# Patient Record
Sex: MALE | Race: WHITE | NOT HISPANIC OR LATINO | Employment: OTHER | URBAN - METROPOLITAN AREA
[De-identification: names, ages, dates, MRNs, and addresses within clinical notes are randomized per-mention and may not be internally consistent; named-entity substitution may affect disease eponyms.]

---

## 2021-03-04 PROBLEM — N40.1 BENIGN PROSTATIC HYPERPLASIA WITH URINARY FREQUENCY: Status: ACTIVE | Noted: 2021-03-04

## 2021-03-04 PROBLEM — R35.0 BENIGN PROSTATIC HYPERPLASIA WITH URINARY FREQUENCY: Status: ACTIVE | Noted: 2021-03-04

## 2021-03-04 PROBLEM — I48.19 PERSISTENT ATRIAL FIBRILLATION (HCC): Status: ACTIVE | Noted: 2021-03-04

## 2021-03-04 PROBLEM — I48.91 ATRIAL FIBRILLATION WITH RVR (HCC): Status: ACTIVE | Noted: 2021-03-04

## 2021-03-04 PROBLEM — I50.9 ACUTE DECOMPENSATED HEART FAILURE (HCC): Status: ACTIVE | Noted: 2021-03-04

## 2021-03-04 PROBLEM — E66.9 OBESITY (BMI 30-39.9): Status: ACTIVE | Noted: 2021-03-04

## 2021-03-04 PROBLEM — I10 ESSENTIAL HYPERTENSION: Status: ACTIVE | Noted: 2021-03-04

## 2021-03-04 PROBLEM — D64.9 NORMOCYTIC ANEMIA: Status: ACTIVE | Noted: 2021-03-04

## 2021-03-18 PROBLEM — I50.21 ACUTE SYSTOLIC (CONGESTIVE) HEART FAILURE (HCC): Status: ACTIVE | Noted: 2021-03-04

## 2021-03-31 PROBLEM — M19.90 ARTHRITIS: Status: ACTIVE | Noted: 2021-03-31

## 2022-01-04 PROBLEM — R20.0 BILATERAL HAND NUMBNESS: Status: ACTIVE | Noted: 2022-01-04

## 2022-01-04 PROBLEM — M54.12 CERVICAL RADICULOPATHY: Status: ACTIVE | Noted: 2022-01-04

## 2022-01-04 PROBLEM — E11.42 DIABETIC POLYNEUROPATHY ASSOCIATED WITH TYPE 2 DIABETES MELLITUS (HCC): Status: ACTIVE | Noted: 2022-01-04

## 2022-04-05 PROBLEM — Z96.652 S/P TOTAL KNEE ARTHROPLASTY, LEFT: Status: ACTIVE | Noted: 2022-04-05

## 2022-08-01 ENCOUNTER — OFFICE VISIT (OUTPATIENT)
Dept: CARDIOLOGY | Facility: MEDICAL CENTER | Age: 72
End: 2022-08-01
Payer: COMMERCIAL

## 2022-08-01 ENCOUNTER — TELEPHONE (OUTPATIENT)
Dept: CARDIOLOGY | Facility: MEDICAL CENTER | Age: 72
End: 2022-08-01

## 2022-08-01 VITALS
HEIGHT: 76 IN | BODY MASS INDEX: 28.49 KG/M2 | OXYGEN SATURATION: 99 % | DIASTOLIC BLOOD PRESSURE: 60 MMHG | HEART RATE: 53 BPM | SYSTOLIC BLOOD PRESSURE: 132 MMHG | WEIGHT: 234 LBS | RESPIRATION RATE: 16 BRPM

## 2022-08-01 DIAGNOSIS — I34.0 NONRHEUMATIC MITRAL VALVE REGURGITATION: ICD-10-CM

## 2022-08-01 DIAGNOSIS — I25.10 CORONARY ARTERY DISEASE INVOLVING NATIVE CORONARY ARTERY OF NATIVE HEART WITHOUT ANGINA PECTORIS: ICD-10-CM

## 2022-08-01 DIAGNOSIS — I10 ESSENTIAL HYPERTENSION: ICD-10-CM

## 2022-08-01 DIAGNOSIS — Z79.01 CHRONIC ANTICOAGULATION: ICD-10-CM

## 2022-08-01 DIAGNOSIS — I42.0 DILATED CARDIOMYOPATHY (HCC): ICD-10-CM

## 2022-08-01 DIAGNOSIS — I48.19 PERSISTENT ATRIAL FIBRILLATION (HCC): ICD-10-CM

## 2022-08-01 DIAGNOSIS — I36.1 NONRHEUMATIC TRICUSPID VALVE REGURGITATION: ICD-10-CM

## 2022-08-01 DIAGNOSIS — I50.22 CHRONIC SYSTOLIC CONGESTIVE HEART FAILURE (HCC): ICD-10-CM

## 2022-08-01 DIAGNOSIS — Z95.5 STENTED CORONARY ARTERY: ICD-10-CM

## 2022-08-01 LAB — EKG IMPRESSION: NORMAL

## 2022-08-01 PROCEDURE — 90050 PR POSTPARTUM VISIT: CPT | Performed by: INTERNAL MEDICINE

## 2022-08-01 PROCEDURE — 93000 ELECTROCARDIOGRAM COMPLETE: CPT | Performed by: INTERNAL MEDICINE

## 2022-08-01 ASSESSMENT — ENCOUNTER SYMPTOMS
WEAKNESS: 0
NEUROLOGICAL NEGATIVE: 1
EYES NEGATIVE: 1
NAUSEA: 0
PALPITATIONS: 0
DEPRESSION: 0
BLURRED VISION: 0
SHORTNESS OF BREATH: 1
CONSTITUTIONAL NEGATIVE: 1
BRUISES/BLEEDS EASILY: 0
CHILLS: 0
PSYCHIATRIC NEGATIVE: 1
DIZZINESS: 0
ABDOMINAL PAIN: 0
CLAUDICATION: 0
HEARTBURN: 1
MYALGIAS: 0
FEVER: 0
WEIGHT LOSS: 0
CARDIOVASCULAR NEGATIVE: 1
FALLS: 1
COUGH: 0
DOUBLE VISION: 0
NERVOUS/ANXIOUS: 0
HEADACHES: 0
FOCAL WEAKNESS: 0
CONSTIPATION: 1
VOMITING: 0

## 2022-08-01 ASSESSMENT — FIBROSIS 4 INDEX: FIB4 SCORE: 3.12

## 2022-08-01 NOTE — TELEPHONE ENCOUNTER
Records request per JI    Sent STAT records request [facility].    Confirmation fax received and sent to scan.

## 2022-08-01 NOTE — PROGRESS NOTES
Chief Complaint   Patient presents with    Atrial Fibrillation     Dx: PAF        Subjective     Shaheen Peguero is a 72 y.o. male who presents today as a consult from Daysi Villalba from [facility] for atrial fibrillation.    Thank you for allowing me to evaluate Mr. Peguero, who as you know is a 72 year old  with atrial fibrillation, coronary artery disease with prior stent placement, dilated cardiomyopathy, valvular heart disease, hypertension, dyslipidemia, previous tobacco abuse, family history of coronary artery disease. He is clinically doing well. He admits to mild shortness of breath. He denies chest pain, palpitations, nausea/vomiting or diaphoresis.He has had majority of his care at [facility].     Past Medical History:   Diagnosis Date    A-fib (HCC)     Acute nasopharyngitis     COVID Jan 1 2022    Allergy     Anemia     Arthritis     At risk for sleep apnea     Atrial fibrillation (HCC)     Blood clotting disorder (HCC)     DVT after R TKA 2012    Cataract     2014    Cervical radiculopathy 01/04/2022    Congestive heart failure (HCC)     2020 SOB then Dx CHF 2021 Becerra for 3 days       Diabetes (HCC)     1996    Diabetic polyneuropathy associated with type 2 diabetes mellitus (HCC) 01/04/2022    Dilated cardiomyopathy (HCC)     Heart failure, congestive, etiology unknown (HCC)     Afib with Cardio version April 2021    High cholesterol     Hypertension     Indigestion     Nonrheumatic mitral valve regurgitation 08/01/2022    Pneumonia     2015 double PNX 2 days in ICU    Pulmonary hypertension (HCC)     Urinary incontinence      Past Surgical History:   Procedure Laterality Date    PB TOTAL KNEE ARTHROPLASTY Left 4/5/2022    Procedure: LEFT TOTAL KNEE ARTHROPLASTY WITH KASSI;  Surgeon: Marco Patel M.D.;  Location: SURGERY [place];  Service: Orthopedics    [place] COLONOSCOPY,DIAGNOSTIC  3/14/2022    Procedure: COLONOSCOPY;  Surgeon: Ronal Lockett D.O.;  Location: SURGERY [place] [facility];  Service:  General    CARDIOVASCULAR  2021    cardio version     ANGIOGRAM  2021    EYE SURGERY Left 2015    Cataracts Removal    ARTHROPLASTY Right 2012    Knee Replacement    KNEE ARTHROPLASTY TOTAL Right 2012    DVT behind knee after TKA    ORIF, FRACTURE, HUMERUS Right 2010     Family History   Problem Relation Age of Onset    Diabetes Father     Arthritis Father     Lung Disease Brother     Alcohol abuse Brother     Heart Disease Mother     Hypertension Brother     Alcohol abuse Brother      Social History     Socioeconomic History    Marital status:      Spouse name: Cici    Number of children: 0    Years of education: 12    Highest education level: Not on file   Occupational History    Not on file   Tobacco Use    Smoking status: Former Smoker     Packs/day: 1.00     Years: 20.00     Pack years: 20.00     Types: Cigarettes    Smokeless tobacco: Former User     Quit date: 1991   Vaping Use    Vaping Use: Never used   Substance and Sexual Activity    Alcohol use: Not Currently     Comment: Alcoholic  1990 Quit    Drug use: No    Sexual activity: Not Currently     Partners: Female     Birth control/protection: Abstinence, Condom   Other Topics Concern    Not on file   Social History Narrative    Not on file     Social Determinants of Health     Financial Resource Strain: Not on file   Food Insecurity: Not on file   Transportation Needs: Not on file   Physical Activity: Not on file   Stress: Not on file   Social Connections: Not on file   Intimate Partner Violence: Not on file   Housing Stability: Not on file     No Known Allergies     (Medications reviewed.)  Outpatient Encounter Medications as of 8/1/2022   Medication Sig Dispense Refill    gabapentin (NEURONTIN) 300 MG Cap Take 1 Capsule by mouth at bedtime. 30 Capsule 0    docusate sodium (COLACE) 100 MG Cap Take 1 Capsule by mouth 2 times a day. 60 Capsule 0    acetaminophen (TYLENOL) 500 MG Tab Take 2 Tablets by mouth every 8 hours. 90 Tablet 1    Sennosides  (SENNA) 8.6 MG Cap Take 17.2 mg by mouth every day. 30 Capsule 0    ondansetron (ZOFRAN ODT) 4 MG TABLET DISPERSIBLE Take 1 Tablet by mouth every 8 hours as needed. (Patient taking differently: Take 4 mg by mouth every 8 hours as needed for Nausea.) 30 Tablet 0    furosemide (LASIX) 20 MG Tab Take 20 mg by mouth every day. Take in the morning      Nutritional Supplements (WELLNESS ESSENTIALS PO) Take 1 Tablet by mouth 2 times a day.      MELATONIN ER PO Take 3 mg by mouth at bedtime.      Insulin Glargine (LANTUS SC) Inject 25 mg under the skin at bedtime.      Ferrous Sulfate (IRON PO) Take 1 Capsule by mouth every day.      Ascorbic Acid (VITAMIN C PO) Take 1 Tablet by mouth every day at 6 PM.      rosuvastatin (CRESTOR) 40 MG tablet Take 20 mg by mouth every day. Patient takes 1/2 40mg tab      levothyroxine (SYNTHROID) 100 MCG Tab Take 100 mcg by mouth every morning on an empty stomach.      metoprolol SR (TOPROL XL) 50 MG TABLET SR 24 HR Take 50 mg by mouth every day.      polyethylene glycol/lytes (MIRALAX) 17 g Pack Take 17 g by mouth 1 time a day as needed (for constipation).      amiodarone (CORDARONE) 200 MG Tab Take 200 mg by mouth every day.      magnesium oxide (MAG-OX) 400 MG Tab tablet Take 400 mg by mouth every day.      cyanocobalamin (VITAMIN B-12) 500 MCG Tab Take 500 mcg by mouth every day.      vitamin D (CHOLECALCIFEROL) 1000 Unit (25 mcg) Tab Take 1,000 Units by mouth every day.      rivaroxaban (XARELTO) 20 MG Tab tablet Take 1 tablet by mouth with dinner. 30 tablet 0    metformin (GLUCOPHAGE) 500 MG TABS Take 1,000 mg by mouth 2 times a day, with meals.      omeprazole (PRILOSEC) 20 MG delayed-release capsule Take 20 mg by mouth every day.      tamsulosin (FLOMAX) 0.4 MG capsule Take 0.4 mg by mouth every bedtime.      [DISCONTINUED] clopidogrel (PLAVIX) 75 MG Tab Take 75 mg by mouth every day. (Patient not taking: Reported on 8/1/2022)       No facility-administered encounter medications on  "file as of 8/1/2022.     Review of Systems   Constitutional: Negative.  Negative for chills, fever, malaise/fatigue and weight loss.   HENT: Positive for hearing loss.    Eyes: Negative.  Negative for blurred vision and double vision.   Respiratory: Positive for shortness of breath. Negative for cough.    Cardiovascular: Negative.  Negative for chest pain, palpitations, claudication and leg swelling.   Gastrointestinal: Positive for constipation and heartburn. Negative for abdominal pain, nausea and vomiting.   Genitourinary: Negative.  Negative for dysuria and urgency.   Musculoskeletal: Positive for falls and joint pain. Negative for myalgias.   Skin: Negative.  Negative for itching and rash.   Neurological: Negative.  Negative for dizziness, focal weakness, weakness and headaches.   Endo/Heme/Allergies: Positive for environmental allergies. Does not bruise/bleed easily.   Psychiatric/Behavioral: Negative.  Negative for depression. The patient is not nervous/anxious.               Objective     /60 (BP Location: Left arm, Patient Position: Sitting, BP Cuff Size: Adult)   Pulse (!) 53   Resp 16   Ht 1.93 m (6' 4\")   Wt 106 kg (234 lb)   SpO2 99%   BMI 28.48 kg/m²     Physical Exam  Constitutional:       Appearance: He is well-developed.   HENT:      Head: Normocephalic and atraumatic.   Neck:      Vascular: No JVD.   Cardiovascular:      Rate and Rhythm: Normal rate and regular rhythm.      Heart sounds: Normal heart sounds.   Pulmonary:      Effort: Pulmonary effort is normal.      Breath sounds: Normal breath sounds.   Abdominal:      General: Bowel sounds are normal.      Palpations: Abdomen is soft.      Comments: No hepatosplenomegaly.   Musculoskeletal:         General: Normal range of motion.   Lymphadenopathy:      Cervical: No cervical adenopathy.   Skin:     General: Skin is warm and dry.   Neurological:      Mental Status: He is alert and oriented to person, place, and time.           "   CARDIAC STUDIES/PROCEDURES:    ECHOCARDIOGRAM CONCLUSIONS at [facility] (03/05/21)  Echocardiogram showing severely reduced left ventricular systolic function with ejection fraction of 28%, severe mitral and tricuspid regurgitation, estimated right ventricular systolic pressure of 40 mmHg  (study result reviewed)    EKG was ordered for atrial fibrillation, performed on (08/01/22) was reviewed: EKG, personally interpreted shows sinus bradycardia.    Assessment & Plan     1. Persistent atrial fibrillation (HCC)  EKG   2. Chronic anticoagulation     3. Coronary artery disease involving native coronary artery of native heart without angina pectoris     4. Stented coronary artery     5. Chronic systolic congestive heart failure (HCC)     6. Dilated cardiomyopathy (HCC)     7. Nonrheumatic mitral valve regurgitation     8. Nonrheumatic tricuspid valve regurgitation     9. Essential hypertension         Medical Decision Making: Today's Assessment/Status/Plan:        1. Atrial fibrillation on anticoagulation therapy (Xarelto): He is doing well on anticoagulation and sinus rhythm with amiodarone.   2. Coronary artery disease with stent placement: He is clinically doing well without recurrence of his angina.  We will continue with current medical care including metoprolol. We obtained records of cardiac catheterization from [facility], which he would like reviewed.  3. Chronic systolic congestive heart failure with cardiomyopathy: The overall volume status is adequate.  3. Mitral regurgitation: His mitral regurgitation has reached severe status on his recent echocardiogram and he is symptomatic, NYHA class III. We will schedule for echocardiogram as above and likely transesophageal echocardiogram and cardiac catheterization.    We will follow up in 3 months.     CC Harpreet Azevedo    ERROR: Corrected with patient 11/18/22 11:19.  Bjorn Wall MD,FACC,[facility]

## 2022-11-16 ENCOUNTER — HOSPITAL ENCOUNTER (OUTPATIENT)
Dept: CARDIOLOGY | Facility: MEDICAL CENTER | Age: 72
End: 2022-11-16
Attending: INTERNAL MEDICINE
Payer: COMMERCIAL

## 2022-11-16 DIAGNOSIS — Z95.5 STENTED CORONARY ARTERY: ICD-10-CM

## 2022-11-16 DIAGNOSIS — I48.19 PERSISTENT ATRIAL FIBRILLATION (HCC): ICD-10-CM

## 2022-11-16 DIAGNOSIS — I50.22 CHRONIC SYSTOLIC CONGESTIVE HEART FAILURE (HCC): ICD-10-CM

## 2022-11-16 DIAGNOSIS — I42.0 DILATED CARDIOMYOPATHY (HCC): ICD-10-CM

## 2022-11-16 DIAGNOSIS — I36.1 NONRHEUMATIC TRICUSPID VALVE REGURGITATION: ICD-10-CM

## 2022-11-16 DIAGNOSIS — I25.10 CORONARY ARTERY DISEASE INVOLVING NATIVE CORONARY ARTERY OF NATIVE HEART WITHOUT ANGINA PECTORIS: ICD-10-CM

## 2022-11-16 DIAGNOSIS — I34.0 NONRHEUMATIC MITRAL VALVE REGURGITATION: ICD-10-CM

## 2022-11-16 PROCEDURE — 93306 TTE W/DOPPLER COMPLETE: CPT

## 2022-11-17 LAB
LV EJECT FRACT  99904: 65
LV EJECT FRACT MOD 2C 99903: 65.83
LV EJECT FRACT MOD 4C 99902: 58.2
LV EJECT FRACT MOD BP 99901: 59.98

## 2022-11-17 PROCEDURE — 93306 TTE W/DOPPLER COMPLETE: CPT | Mod: 26 | Performed by: INTERNAL MEDICINE

## 2022-11-18 ENCOUNTER — OFFICE VISIT (OUTPATIENT)
Dept: CARDIOLOGY | Facility: MEDICAL CENTER | Age: 72
End: 2022-11-18
Payer: COMMERCIAL

## 2022-11-18 VITALS
DIASTOLIC BLOOD PRESSURE: 72 MMHG | HEART RATE: 53 BPM | BODY MASS INDEX: 29.59 KG/M2 | SYSTOLIC BLOOD PRESSURE: 138 MMHG | RESPIRATION RATE: 16 BRPM | OXYGEN SATURATION: 100 % | WEIGHT: 243 LBS | HEIGHT: 76 IN

## 2022-11-18 DIAGNOSIS — Z95.5 STENTED CORONARY ARTERY: ICD-10-CM

## 2022-11-18 DIAGNOSIS — I48.19 PERSISTENT ATRIAL FIBRILLATION (HCC): ICD-10-CM

## 2022-11-18 DIAGNOSIS — I25.10 CORONARY ARTERY DISEASE INVOLVING NATIVE CORONARY ARTERY OF NATIVE HEART WITHOUT ANGINA PECTORIS: ICD-10-CM

## 2022-11-18 DIAGNOSIS — E78.5 DYSLIPIDEMIA: ICD-10-CM

## 2022-11-18 DIAGNOSIS — I10 ESSENTIAL HYPERTENSION: ICD-10-CM

## 2022-11-18 PROBLEM — I50.21 ACUTE SYSTOLIC (CONGESTIVE) HEART FAILURE (HCC): Status: RESOLVED | Noted: 2021-03-04 | Resolved: 2022-11-18

## 2022-11-18 PROBLEM — I42.0 DILATED CARDIOMYOPATHY (HCC): Status: RESOLVED | Noted: 2022-08-01 | Resolved: 2022-11-18

## 2022-11-18 PROBLEM — I34.0 NONRHEUMATIC MITRAL VALVE REGURGITATION: Status: RESOLVED | Noted: 2022-08-01 | Resolved: 2022-11-18

## 2022-11-18 PROCEDURE — 99214 OFFICE O/P EST MOD 30 MIN: CPT | Performed by: INTERNAL MEDICINE

## 2022-11-18 ASSESSMENT — ENCOUNTER SYMPTOMS
ABDOMINAL PAIN: 0
NAUSEA: 0
BRUISES/BLEEDS EASILY: 0
HEADACHES: 0
VOMITING: 0
DEPRESSION: 0
CHILLS: 0
CONSTITUTIONAL NEGATIVE: 1
GASTROINTESTINAL NEGATIVE: 1
EYES NEGATIVE: 1
WEIGHT LOSS: 0
FEVER: 0
NEUROLOGICAL NEGATIVE: 1
DOUBLE VISION: 0
WEAKNESS: 0
SHORTNESS OF BREATH: 0
CARDIOVASCULAR NEGATIVE: 1
COUGH: 0
PSYCHIATRIC NEGATIVE: 1
MYALGIAS: 0
DIZZINESS: 0
MUSCULOSKELETAL NEGATIVE: 1
CLAUDICATION: 0
RESPIRATORY NEGATIVE: 1
FOCAL WEAKNESS: 0
NERVOUS/ANXIOUS: 0
PALPITATIONS: 0
BLURRED VISION: 0

## 2022-11-18 ASSESSMENT — FIBROSIS 4 INDEX: FIB4 SCORE: 3.12

## 2022-11-18 NOTE — PROGRESS NOTES
Chief Complaint   Patient presents with    Atrial Fibrillation     F/V Dx: Persistent atrial fibrillation (HCC)       Subjective     Shaheen Peguero is a 72 y.o. male who presents today for follow up of coronary artery disease with prior stent placement.    Since the patient's last visit on 08/01/22, he has been doing well clinically. He denies chest pain, shortness of breath, palpitations, nausea/vomiting or diaphoresis. He keeps active taking 10,000 steps daily.     Past Medical History:   Diagnosis Date    A-fib (HCC)     Acute nasopharyngitis     COVID Jan 1 2022    Allergy     Anemia     Arthritis     At risk for sleep apnea     Atrial fibrillation (HCC)     Blood clotting disorder (HCC)     DVT after R TKA 2012    Cataract     2014    Cervical radiculopathy 01/04/2022    Congestive heart failure (HCC)     2020 SOB then Dx CHF 2021 Becerra for 3 days       Diabetes (HCC)     1996    Diabetic polyneuropathy associated with type 2 diabetes mellitus (HCC) 01/04/2022    Dilated cardiomyopathy (HCC)     Dyslipidemia 11/18/2022    Heart failure, congestive, etiology unknown (HCC)     Afib with Cardio version April 2021    High cholesterol     Hypertension     Indigestion     Nonrheumatic mitral valve regurgitation 08/01/2022    Pneumonia     2015 double PNX 2 days in ICU    Pulmonary hypertension (HCC)     Urinary incontinence      Past Surgical History:   Procedure Laterality Date    PB TOTAL KNEE ARTHROPLASTY Left 4/5/2022    Procedure: LEFT TOTAL KNEE ARTHROPLASTY WITH KASSI;  Surgeon: Marco Patel M.D.;  Location: SURGERY [place];  Service: Orthopedics    [place] COLONOSCOPY,DIAGNOSTIC  3/14/2022    Procedure: COLONOSCOPY;  Surgeon: Ronal Lockett D.O.;  Location: SURGERY [place] [facility];  Service: General    CARDIOVASCULAR  2021    cardio version     ANGIOGRAM  2021    EYE SURGERY Left 2015    Cataracts Removal    ARTHROPLASTY Right 2012    Knee Replacement    KNEE ARTHROPLASTY TOTAL Right 2012    DVT behind knee  after TKA    ORIF, FRACTURE, HUMERUS Right 2010     Family History   Problem Relation Age of Onset    Diabetes Father     Arthritis Father     Lung Disease Brother     Alcohol abuse Brother     Heart Disease Mother     Hypertension Brother     Alcohol abuse Brother      Social History     Socioeconomic History    Marital status:      Spouse name: Cici    Number of children: 0    Years of education: 12    Highest education level: Not on file   Occupational History    Not on file   Tobacco Use    Smoking status: Former     Packs/day: 1.00     Years: 20.00     Pack years: 20.00     Types: Cigarettes    Smokeless tobacco: Former     Quit date: 1991   Vaping Use    Vaping Use: Never used   Substance and Sexual Activity    Alcohol use: Not Currently     Comment: Alcoholic  1990 Quit    Drug use: No    Sexual activity: Not Currently     Partners: Female     Birth control/protection: Abstinence, Condom   Other Topics Concern    Not on file   Social History Narrative    Not on file     Social Determinants of Health     Financial Resource Strain: Not on file   Food Insecurity: Not on file   Transportation Needs: Not on file   Physical Activity: Not on file   Stress: Not on file   Social Connections: Not on file   Intimate Partner Violence: Not on file   Housing Stability: Not on file     No Known Allergies    (Medications reviewed.)  Outpatient Encounter Medications as of 11/18/2022   Medication Sig Dispense Refill    gabapentin (NEURONTIN) 300 MG Cap Take 1 Capsule by mouth 2 times a day. 120 Capsule 0    docusate sodium (COLACE) 100 MG Cap Take 1 Capsule by mouth 2 times a day. 60 Capsule 0    acetaminophen (TYLENOL) 500 MG Tab Take 2 Tablets by mouth every 8 hours. 90 Tablet 1    furosemide (LASIX) 20 MG Tab Take 1 Tablet by mouth every day. Take in the morning      Nutritional Supplements (WELLNESS ESSENTIALS PO) Take 1 Tablet by mouth 2 times a day.      MELATONIN ER PO Take 3 mg by mouth at bedtime.       Insulin Glargine (LANTUS SC) Inject 25 mg under the skin at bedtime.      Ferrous Sulfate (IRON PO) Take 1 Capsule by mouth every day.      Ascorbic Acid (VITAMIN C PO) Take 1 Tablet by mouth every day at 6 PM.      rosuvastatin (CRESTOR) 40 MG tablet Take 0.5 Tablets by mouth every day. Patient takes 1/2 40mg tab      levothyroxine (SYNTHROID) 100 MCG Tab Take 1 Tablet by mouth every morning on an empty stomach.      metoprolol SR (TOPROL XL) 50 MG TABLET SR 24 HR Take 1 Tablet by mouth every day.      polyethylene glycol/lytes (MIRALAX) 17 g Pack Take 1 Packet by mouth 1 time a day as needed (for constipation).      amiodarone (CORDARONE) 200 MG Tab Take 1 Tablet by mouth every day.      magnesium oxide (MAG-OX) 400 MG Tab tablet Take 1 Tablet by mouth every day.      cyanocobalamin (VITAMIN B-12) 500 MCG Tab Take 1 Tablet by mouth every day.      vitamin D (CHOLECALCIFEROL) 1000 Unit (25 mcg) Tab Take 1 Tablet by mouth every day.      rivaroxaban (XARELTO) 20 MG Tab tablet Take 1 tablet by mouth with dinner. 30 tablet 0    metformin (GLUCOPHAGE) 500 MG TABS Take 2 Tablets by mouth 2 times a day with meals.      omeprazole (PRILOSEC) 20 MG delayed-release capsule Take 1 Capsule by mouth every day.      tamsulosin (FLOMAX) 0.4 MG capsule Take 1 Capsule by mouth at bedtime.      Sennosides (SENNA) 8.6 MG Cap Take 17.2 mg by mouth every day. (Patient not taking: Reported on 8/2/2022) 30 Capsule 0    ondansetron (ZOFRAN ODT) 4 MG TABLET DISPERSIBLE Take 1 Tablet by mouth every 8 hours as needed. (Patient not taking: Reported on 8/2/2022) 30 Tablet 0     No facility-administered encounter medications on file as of 11/18/2022.     Review of Systems   Constitutional: Negative.  Negative for chills, fever, malaise/fatigue and weight loss.   HENT: Negative.  Negative for hearing loss.    Eyes: Negative.  Negative for blurred vision and double vision.   Respiratory: Negative.  Negative for cough and shortness of breath.   "  Cardiovascular: Negative.  Negative for chest pain, palpitations, claudication and leg swelling.   Gastrointestinal: Negative.  Negative for abdominal pain, nausea and vomiting.   Genitourinary: Negative.  Negative for dysuria and urgency.   Musculoskeletal: Negative.  Negative for joint pain and myalgias.   Skin: Negative.  Negative for itching and rash.   Neurological: Negative.  Negative for dizziness, focal weakness, weakness and headaches.   Endo/Heme/Allergies: Negative.  Does not bruise/bleed easily.   Psychiatric/Behavioral: Negative.  Negative for depression. The patient is not nervous/anxious.             Objective     /72 (BP Location: Left arm, Patient Position: Sitting, BP Cuff Size: Adult)   Pulse (!) 53   Resp 16   Ht 1.93 m (6' 4\")   Wt 110 kg (243 lb)   SpO2 100%   BMI 29.58 kg/m²     Physical Exam  Constitutional:       Appearance: Normal appearance. He is well-developed and normal weight.   HENT:      Head: Normocephalic and atraumatic.   Neck:      Vascular: No JVD.   Cardiovascular:      Rate and Rhythm: Normal rate and regular rhythm.      Heart sounds: Normal heart sounds.   Pulmonary:      Effort: Pulmonary effort is normal.      Breath sounds: Normal breath sounds.   Abdominal:      General: Bowel sounds are normal.      Palpations: Abdomen is soft.      Comments: No hepatosplenomegaly.   Musculoskeletal:         General: Normal range of motion.   Lymphadenopathy:      Cervical: No cervical adenopathy.   Skin:     General: Skin is warm and dry.   Neurological:      Mental Status: He is alert and oriented to person, place, and time.          CARDIAC STUDIES/PROCEDURES:    CARDIAC CATHETERIZATION CONCLUSIONS at [facility] (06/07/21)  Cardiac catheterization with placement of 3.0 x 15 Biotronik to left anterior descending artery.   (study result reviewed)     ECHOCARDIOGRAM CONCLUSIONS (11/16/22)  Prior study on 3/5/21 at [facility], compared to the   report of the prior " study, there has been normalization of left   ventricular systolic function.  Normal left ventricular systolic function.  The left ventricular ejection fraction is visually estimated to be 65%.  Mild mitral regurgitation.  (study result reviewed)     ECHOCARDIOGRAM CONCLUSIONS at [facility] (03/05/21)  Echocardiogram showing severely reduced left ventricular systolic function with ejection fraction of 28%, severe mitral and tricuspid regurgitation, estimated right ventricular systolic pressure of 40 mmHg    EKG performed on (08/01/22) EKG shows sinus bradycardia.    Assessment & Plan     1. Coronary artery disease involving native coronary artery of native heart without angina pectoris        2. Stented coronary artery        3. Essential hypertension        4. Dyslipidemia        5. Persistent atrial fibrillation (HCC)            Medical Decision Making: Today's Assessment/Status/Plan:        Coronary artery disease with stent placement: He is clinically doing well without recurrence of his angina.  We will continue with current medical care including metoprolol, rosuvastatin.  Hypertension: Blood pressure is well controlled. We will continue with beta blockade therapy.  Hyperlipidemia: He is doing well on statin therapy without myalgia symptoms.  Atrial fibrillation on anticoagulation therapy (Xarelto): He is doing well on anticoagulation. We will discontinue amiodarone.     We will follow up the patient in one year.    CC Bobby Perdue and Daysi Villalba at [facility].

## 2022-11-18 NOTE — LETTER
[employer] [facility]  Bobby Mcdowell M.D.  [address] [place] [address]  Fax: [phone]   Authorization for Release/Disclosure of   Protected Health Information   Name: PARVEZ CALVIN : 1950 SSN: [id number]   Address: [facility] [address]  [place] [address] Phone:    [phone] (home)    I authorize the entity listed below to release/disclose the PHI below to:   [facility]/Bobby Mcdowell M.D. and Bjorn Wall M.D.   Provider or Entity Name:  {[facility] COLORECTAL SCREENING LOCATIONS:[id number]}   Reason for request: continuity of care   Information to be released:    [ X ] LAST COLONOSCOPY,  including any PATH REPORT and follow-up  [ X ] LAST FIT/COLOGUARD RESULT [  ] LAST DEXA  [  ] LAST MAMMOGRAM  [  ] LAST PAP  [  ] LAST LABS [  ] RETINA EXAM REPORT  [  ] IMMUNIZATION RECORDS  [  ] Release all info      [  ] Check here and initial the line next to each item to release ALL health information INCLUDING  _____ Care and treatment for drug and / or alcohol abuse  _____ HIV testing, infection status, or AIDS  _____ Genetic Testing    DATES OF SERVICE OR TIME PERIOD TO BE DISCLOSED: _____________  I understand and acknowledge that:  * This Authorization may be revoked at any time by you in writing, except if your health information has already been used or disclosed.  * Your health information that will be used or disclosed as a result of you signing this authorization could be re-disclosed by the recipient. If this occurs, your re-disclosed health information may no longer be protected by State or Federal laws.  * You may refuse to sign this Authorization. Your refusal will not affect your ability to obtain treatment.  * This Authorization becomes effective upon signing and will  on (date) __________.      If no date is indicated, this Authorization will  one (1) year from the signature date.    Name: Parvez Calvin    Signature:   Date:     2022       PLEASE FAX  REQUESTED RECORDS BACK TO: [phone]

## 2024-02-27 ENCOUNTER — HOSPITAL ENCOUNTER (OUTPATIENT)
Dept: RADIOLOGY | Facility: MEDICAL CENTER | Age: 74
End: 2024-02-27
Attending: EMERGENCY MEDICINE

## 2024-02-27 ENCOUNTER — APPOINTMENT (OUTPATIENT)
Dept: RADIOLOGY | Facility: MEDICAL CENTER | Age: 74
DRG: 004 | End: 2024-02-27
Payer: COMMERCIAL

## 2024-02-27 ENCOUNTER — HOSPITAL ENCOUNTER (INPATIENT)
Facility: MEDICAL CENTER | Age: 74
LOS: 14 days | DRG: 004 | End: 2024-03-12
Attending: EMERGENCY MEDICINE | Admitting: SURGERY
Payer: COMMERCIAL

## 2024-02-27 ENCOUNTER — APPOINTMENT (OUTPATIENT)
Dept: RADIOLOGY | Facility: MEDICAL CENTER | Age: 74
DRG: 004 | End: 2024-02-27
Attending: SURGERY
Payer: COMMERCIAL

## 2024-02-27 DIAGNOSIS — J96.01 ACUTE RESPIRATORY FAILURE WITH HYPOXIA (HCC): ICD-10-CM

## 2024-02-27 DIAGNOSIS — T14.90XA TRAUMA: ICD-10-CM

## 2024-02-27 DIAGNOSIS — J98.2 PNEUMOMEDIASTINUM (HCC): ICD-10-CM

## 2024-02-27 DIAGNOSIS — R20.0 BILATERAL HAND NUMBNESS: ICD-10-CM

## 2024-02-27 DIAGNOSIS — J93.9 PNEUMOTHORAX, UNSPECIFIED TYPE: ICD-10-CM

## 2024-02-27 DIAGNOSIS — E11.42 DIABETIC POLYNEUROPATHY ASSOCIATED WITH TYPE 2 DIABETES MELLITUS (HCC): ICD-10-CM

## 2024-02-27 PROBLEM — E78.00 HIGH CHOLESTEROL: Status: ACTIVE | Noted: 2024-02-27

## 2024-02-27 PROBLEM — J96.90 RESPIRATORY FAILURE FOLLOWING TRAUMA (HCC): Status: ACTIVE | Noted: 2024-02-27

## 2024-02-27 PROBLEM — R55 SYNCOPE AND COLLAPSE: Status: ACTIVE | Noted: 2024-02-27

## 2024-02-27 PROBLEM — S12.8XXA: Status: ACTIVE | Noted: 2024-02-27

## 2024-02-27 PROBLEM — I50.9 CONGESTIVE HEART FAILURE (HCC): Status: ACTIVE | Noted: 2024-02-27

## 2024-02-27 PROBLEM — F43.9 TRAUMA AND STRESSOR-RELATED DISORDER: Status: ACTIVE | Noted: 2024-02-27

## 2024-02-27 PROBLEM — R79.89 ELEVATED TROPONIN: Status: ACTIVE | Noted: 2024-02-27

## 2024-02-27 PROBLEM — T79.7XXA SUBCUTANEOUS EMPHYSEMA (HCC): Status: ACTIVE | Noted: 2024-02-27

## 2024-02-27 PROBLEM — S19.84XA: Status: ACTIVE | Noted: 2024-02-27

## 2024-02-27 PROBLEM — Z86.79 HISTORY OF CHRONIC CHF: Status: ACTIVE | Noted: 2024-02-27

## 2024-02-27 PROBLEM — E11.9 DIABETES (HCC): Status: ACTIVE | Noted: 2024-02-27

## 2024-02-27 LAB
ABO + RH BLD: NORMAL
ABO GROUP BLD: NORMAL
ALBUMIN SERPL BCP-MCNC: 3.8 G/DL (ref 3.2–4.9)
ALBUMIN/GLOB SERPL: 1.3 G/DL
ALP SERPL-CCNC: 67 U/L (ref 30–99)
ALT SERPL-CCNC: 41 U/L (ref 2–50)
ANION GAP SERPL CALC-SCNC: 12 MMOL/L (ref 7–16)
APTT PPP: 29.4 SEC (ref 24.7–36)
ARTERIAL PATENCY WRIST A: ABNORMAL
AST SERPL-CCNC: 40 U/L (ref 12–45)
BASE EXCESS BLDA CALC-SCNC: -3 MMOL/L (ref -4–3)
BASE EXCESS BLDA CALC-SCNC: -5 MMOL/L (ref -4–3)
BILIRUB SERPL-MCNC: 0.4 MG/DL (ref 0.1–1.5)
BLD GP AB SCN SERPL QL: NORMAL
BODY TEMPERATURE: 37 CENTIGRADE
BODY TEMPERATURE: ABNORMAL DEGREES
BREATHS SETTING VENT: 22
BUN SERPL-MCNC: 20 MG/DL (ref 8–22)
CALCIUM ALBUM COR SERPL-MCNC: 9.3 MG/DL (ref 8.5–10.5)
CALCIUM SERPL-MCNC: 9.1 MG/DL (ref 8.5–10.5)
CFT BLD TEG: 4.9 MIN (ref 4.6–9.1)
CFT P HPASE BLD TEG: 4.8 MIN (ref 4.3–8.3)
CHLORIDE SERPL-SCNC: 103 MMOL/L (ref 96–112)
CLOT ANGLE BLD TEG: 70.6 DEGREES (ref 63–78)
CLOT LYSIS 30M P MA LENFR BLD TEG: 1.4 % (ref 0–2.6)
CO2 BLDA-SCNC: 21 MMOL/L (ref 20–33)
CO2 SERPL-SCNC: 20 MMOL/L (ref 20–33)
CREAT SERPL-MCNC: 0.75 MG/DL (ref 0.5–1.4)
CT.EXTRINSIC BLD ROTEM: 1.4 MIN (ref 0.8–2.1)
DELSYS IDSYS: ABNORMAL
EKG IMPRESSION: NORMAL
END TIDAL CARBON DIOXIDE IECO2: 30 MMHG
ERYTHROCYTE [DISTWIDTH] IN BLOOD BY AUTOMATED COUNT: 44.8 FL (ref 35.9–50)
ETHANOL BLD-MCNC: <10.1 MG/DL
GFR SERPLBLD CREATININE-BSD FMLA CKD-EPI: 94 ML/MIN/1.73 M 2
GLOBULIN SER CALC-MCNC: 3 G/DL (ref 1.9–3.5)
GLUCOSE BLD STRIP.AUTO-MCNC: 187 MG/DL (ref 65–99)
GLUCOSE SERPL-MCNC: 208 MG/DL (ref 65–99)
HCO3 BLDA-SCNC: 19.8 MMOL/L (ref 17–25)
HCO3 BLDA-SCNC: 21 MMOL/L (ref 17–25)
HCT VFR BLD AUTO: 40.1 % (ref 42–52)
HGB BLD-MCNC: 13.5 G/DL (ref 14–18)
HOROWITZ INDEX BLDA+IHG-RTO: 272 MM[HG]
INHALED O2 FLOW RATE: 100 L/MIN
INR PPP: 1.14 (ref 0.87–1.13)
LACTATE BLD-SCNC: 0.7 MMOL/L (ref 0.5–2)
LACTATE SERPL-SCNC: 1.1 MMOL/L (ref 0.5–2)
MCF BLD TEG: 60.5 MM (ref 52–69)
MCF.PLATELET INHIB BLD ROTEM: 22.5 MM (ref 15–32)
MCH RBC QN AUTO: 31.3 PG (ref 27–33)
MCHC RBC AUTO-ENTMCNC: 33.7 G/DL (ref 32.3–36.5)
MCV RBC AUTO: 93 FL (ref 81.4–97.8)
MODE IMODE: ABNORMAL
O2/TOTAL GAS SETTING VFR VENT: 100 % (ref 30–60)
O2/TOTAL GAS SETTING VFR VENT: 50 %
PA AA BLD-ACNC: 2.6 % (ref 0–11)
PA ADP BLD-ACNC: 26.6 % (ref 0–17)
PCO2 BLDA: 29.7 MMHG (ref 26–37)
PCO2 BLDA: 40.2 MMHG (ref 26–37)
PCO2 TEMP ADJ BLDA: 30.2 MMHG (ref 26–37)
PCO2 TEMP ADJ BLDA: 40.2 MMHG (ref 26–37)
PEEP END EXPIRATORY PRESSURE IPEEP: 8 CMH20
PH BLDA: 7.33 [PH] (ref 7.4–7.5)
PH BLDA: 7.43 [PH] (ref 7.4–7.5)
PH TEMP ADJ BLDA: 7.33 [PH] (ref 7.4–7.5)
PH TEMP ADJ BLDA: 7.43 [PH] (ref 7.4–7.5)
PLATELET # BLD AUTO: 231 K/UL (ref 164–446)
PMV BLD AUTO: 9.3 FL (ref 9–12.9)
PO2 BLDA: 136 MMHG (ref 64–87)
PO2 BLDA: 301.7 MMHG (ref 64–87)
PO2 TEMP ADJ BLDA: 139 MMHG (ref 64–87)
PO2 TEMP ADJ BLDA: 301.7 MMHG (ref 64–87)
POTASSIUM SERPL-SCNC: 5 MMOL/L (ref 3.6–5.5)
PROT SERPL-MCNC: 6.8 G/DL (ref 6–8.2)
PROTHROMBIN TIME: 14.7 SEC (ref 12–14.6)
RBC # BLD AUTO: 4.31 M/UL (ref 4.7–6.1)
RH BLD: NORMAL
SAO2 % BLDA: 99 % (ref 93–99)
SAO2 % BLDA: 99.1 % (ref 93–99)
SODIUM SERPL-SCNC: 135 MMOL/L (ref 135–145)
SPECIMEN DRAWN FROM PATIENT: ABNORMAL
TEG ALGORITHM TGALG: ABNORMAL
TIDAL VOLUME IVT: 490 ML
TRIGL SERPL-MCNC: 243 MG/DL (ref 0–149)
TROPONIN T SERPL-MCNC: 350 NG/L (ref 6–19)
TROPONIN T SERPL-MCNC: 354 NG/L (ref 6–19)
TSH SERPL DL<=0.005 MIU/L-ACNC: 5.82 UIU/ML (ref 0.38–5.33)
WBC # BLD AUTO: 12.2 K/UL (ref 4.8–10.8)

## 2024-02-27 PROCEDURE — 700105 HCHG RX REV CODE 258: Performed by: NURSE PRACTITIONER

## 2024-02-27 PROCEDURE — 94002 VENT MGMT INPAT INIT DAY: CPT

## 2024-02-27 PROCEDURE — 302977 HCHG BRONCHOSCOPY PROC-THERAPEUTIC

## 2024-02-27 PROCEDURE — 99291 CRITICAL CARE FIRST HOUR: CPT

## 2024-02-27 PROCEDURE — 700111 HCHG RX REV CODE 636 W/ 250 OVERRIDE (IP): Mod: JG | Performed by: NURSE PRACTITIONER

## 2024-02-27 PROCEDURE — 93010 ELECTROCARDIOGRAM REPORT: CPT | Performed by: INTERNAL MEDICINE

## 2024-02-27 PROCEDURE — 770022 HCHG ROOM/CARE - ICU (200)

## 2024-02-27 PROCEDURE — 86900 BLOOD TYPING SEROLOGIC ABO: CPT

## 2024-02-27 PROCEDURE — 86901 BLOOD TYPING SEROLOGIC RH(D): CPT

## 2024-02-27 PROCEDURE — 5A1945Z RESPIRATORY VENTILATION, 24-96 CONSECUTIVE HOURS: ICD-10-PCS | Performed by: NURSE PRACTITIONER

## 2024-02-27 PROCEDURE — 700102 HCHG RX REV CODE 250 W/ 637 OVERRIDE(OP): Performed by: NURSE PRACTITIONER

## 2024-02-27 PROCEDURE — 70491 CT SOFT TISSUE NECK W/DYE: CPT

## 2024-02-27 PROCEDURE — 700111 HCHG RX REV CODE 636 W/ 250 OVERRIDE (IP): Mod: JZ | Performed by: EMERGENCY MEDICINE

## 2024-02-27 PROCEDURE — 80053 COMPREHEN METABOLIC PANEL: CPT

## 2024-02-27 PROCEDURE — 99223 1ST HOSP IP/OBS HIGH 75: CPT | Mod: 25 | Performed by: SURGERY

## 2024-02-27 PROCEDURE — 94799 UNLISTED PULMONARY SVC/PX: CPT

## 2024-02-27 PROCEDURE — 700111 HCHG RX REV CODE 636 W/ 250 OVERRIDE (IP): Mod: JZ | Performed by: SURGERY

## 2024-02-27 PROCEDURE — 700101 HCHG RX REV CODE 250: Performed by: SURGERY

## 2024-02-27 PROCEDURE — 85730 THROMBOPLASTIN TIME PARTIAL: CPT

## 2024-02-27 PROCEDURE — 700117 HCHG RX CONTRAST REV CODE 255: Performed by: SURGERY

## 2024-02-27 PROCEDURE — 84439 ASSAY OF FREE THYROXINE: CPT

## 2024-02-27 PROCEDURE — 36415 COLL VENOUS BLD VENIPUNCTURE: CPT

## 2024-02-27 PROCEDURE — 94003 VENT MGMT INPAT SUBQ DAY: CPT

## 2024-02-27 PROCEDURE — 96374 THER/PROPH/DIAG INJ IV PUSH: CPT

## 2024-02-27 PROCEDURE — 82962 GLUCOSE BLOOD TEST: CPT | Mod: 91

## 2024-02-27 PROCEDURE — 85610 PROTHROMBIN TIME: CPT

## 2024-02-27 PROCEDURE — 82803 BLOOD GASES ANY COMBINATION: CPT

## 2024-02-27 PROCEDURE — 84478 ASSAY OF TRIGLYCERIDES: CPT

## 2024-02-27 PROCEDURE — 0BJ08ZZ INSPECTION OF TRACHEOBRONCHIAL TREE, VIA NATURAL OR ARTIFICIAL OPENING ENDOSCOPIC: ICD-10-PCS | Performed by: SURGERY

## 2024-02-27 PROCEDURE — 85347 COAGULATION TIME ACTIVATED: CPT

## 2024-02-27 PROCEDURE — 85384 FIBRINOGEN ACTIVITY: CPT

## 2024-02-27 PROCEDURE — G0390 TRAUMA RESPONS W/HOSP CRITI: HCPCS

## 2024-02-27 PROCEDURE — 84484 ASSAY OF TROPONIN QUANT: CPT

## 2024-02-27 PROCEDURE — 82077 ASSAY SPEC XCP UR&BREATH IA: CPT

## 2024-02-27 PROCEDURE — 86850 RBC ANTIBODY SCREEN: CPT

## 2024-02-27 PROCEDURE — 71045 X-RAY EXAM CHEST 1 VIEW: CPT

## 2024-02-27 PROCEDURE — 85027 COMPLETE CBC AUTOMATED: CPT

## 2024-02-27 PROCEDURE — 83605 ASSAY OF LACTIC ACID: CPT | Mod: 91

## 2024-02-27 PROCEDURE — 85576 BLOOD PLATELET AGGREGATION: CPT | Mod: 91

## 2024-02-27 PROCEDURE — 84443 ASSAY THYROID STIM HORMONE: CPT

## 2024-02-27 PROCEDURE — 93005 ELECTROCARDIOGRAM TRACING: CPT | Performed by: NURSE PRACTITIONER

## 2024-02-27 PROCEDURE — 37799 UNLISTED PX VASCULAR SURGERY: CPT

## 2024-02-27 RX ORDER — FAMOTIDINE 20 MG/1
20 TABLET, FILM COATED ORAL 2 TIMES DAILY
Status: DISCONTINUED | OUTPATIENT
Start: 2024-02-27 | End: 2024-03-02

## 2024-02-27 RX ORDER — BISACODYL 10 MG
10 SUPPOSITORY, RECTAL RECTAL
Status: DISCONTINUED | OUTPATIENT
Start: 2024-02-27 | End: 2024-03-12 | Stop reason: HOSPADM

## 2024-02-27 RX ORDER — DOCUSATE SODIUM 50 MG/5ML
100 LIQUID ORAL 2 TIMES DAILY
Status: DISCONTINUED | OUTPATIENT
Start: 2024-02-27 | End: 2024-03-11

## 2024-02-27 RX ORDER — DOCUSATE SODIUM 100 MG/1
100 CAPSULE, LIQUID FILLED ORAL 2 TIMES DAILY
Status: DISCONTINUED | OUTPATIENT
Start: 2024-02-27 | End: 2024-02-27

## 2024-02-27 RX ORDER — OXYCODONE HYDROCHLORIDE 10 MG/1
10 TABLET ORAL
Status: DISCONTINUED | OUTPATIENT
Start: 2024-02-27 | End: 2024-03-06

## 2024-02-27 RX ORDER — GABAPENTIN 300 MG/1
300 CAPSULE ORAL 2 TIMES DAILY
Status: DISCONTINUED | OUTPATIENT
Start: 2024-02-27 | End: 2024-02-28

## 2024-02-27 RX ORDER — HYDROMORPHONE HYDROCHLORIDE 1 MG/ML
0.5 INJECTION, SOLUTION INTRAMUSCULAR; INTRAVENOUS; SUBCUTANEOUS
Status: DISCONTINUED | OUTPATIENT
Start: 2024-02-27 | End: 2024-03-08

## 2024-02-27 RX ORDER — ONDANSETRON 4 MG/1
4 TABLET, ORALLY DISINTEGRATING ORAL EVERY 4 HOURS PRN
Status: DISCONTINUED | OUTPATIENT
Start: 2024-02-27 | End: 2024-03-11

## 2024-02-27 RX ORDER — LABETALOL HYDROCHLORIDE 5 MG/ML
10 INJECTION, SOLUTION INTRAVENOUS EVERY 4 HOURS PRN
Status: DISCONTINUED | OUTPATIENT
Start: 2024-02-27 | End: 2024-03-12 | Stop reason: HOSPADM

## 2024-02-27 RX ORDER — SODIUM CHLORIDE 9 MG/ML
INJECTION, SOLUTION INTRAVENOUS CONTINUOUS
Status: DISCONTINUED | OUTPATIENT
Start: 2024-02-27 | End: 2024-03-02

## 2024-02-27 RX ORDER — POLYETHYLENE GLYCOL 3350 17 G/17G
1 POWDER, FOR SOLUTION ORAL 2 TIMES DAILY
Status: DISCONTINUED | OUTPATIENT
Start: 2024-02-27 | End: 2024-03-11

## 2024-02-27 RX ORDER — AMOXICILLIN 250 MG
1 CAPSULE ORAL NIGHTLY
Status: DISCONTINUED | OUTPATIENT
Start: 2024-02-27 | End: 2024-03-11

## 2024-02-27 RX ORDER — ONDANSETRON 2 MG/ML
4 INJECTION INTRAMUSCULAR; INTRAVENOUS EVERY 4 HOURS PRN
Status: DISCONTINUED | OUTPATIENT
Start: 2024-02-27 | End: 2024-03-12 | Stop reason: HOSPADM

## 2024-02-27 RX ORDER — DEXTROSE MONOHYDRATE 25 G/50ML
25 INJECTION, SOLUTION INTRAVENOUS
Status: DISCONTINUED | OUTPATIENT
Start: 2024-02-27 | End: 2024-02-29

## 2024-02-27 RX ORDER — ROSUVASTATIN CALCIUM 20 MG/1
20 TABLET, COATED ORAL DAILY
Status: DISCONTINUED | OUTPATIENT
Start: 2024-02-27 | End: 2024-02-28

## 2024-02-27 RX ORDER — ENEMA 19; 7 G/133ML; G/133ML
1 ENEMA RECTAL
Status: DISCONTINUED | OUTPATIENT
Start: 2024-02-27 | End: 2024-03-12 | Stop reason: HOSPADM

## 2024-02-27 RX ORDER — MIDAZOLAM HYDROCHLORIDE 1 MG/ML
1-5 INJECTION INTRAMUSCULAR; INTRAVENOUS ONCE
Status: COMPLETED | OUTPATIENT
Start: 2024-02-27 | End: 2024-02-27

## 2024-02-27 RX ORDER — AMOXICILLIN 250 MG
1 CAPSULE ORAL
Status: DISCONTINUED | OUTPATIENT
Start: 2024-02-27 | End: 2024-03-11

## 2024-02-27 RX ORDER — ONDANSETRON 4 MG/1
4 TABLET, ORALLY DISINTEGRATING ORAL EVERY 4 HOURS PRN
Status: DISCONTINUED | OUTPATIENT
Start: 2024-02-27 | End: 2024-02-27

## 2024-02-27 RX ORDER — ROCURONIUM BROMIDE 10 MG/ML
100 INJECTION, SOLUTION INTRAVENOUS ONCE
Status: COMPLETED | OUTPATIENT
Start: 2024-02-27 | End: 2024-02-27

## 2024-02-27 RX ORDER — OXYCODONE HYDROCHLORIDE 5 MG/1
5 TABLET ORAL
Status: DISCONTINUED | OUTPATIENT
Start: 2024-02-27 | End: 2024-03-06

## 2024-02-27 RX ORDER — LEVOTHYROXINE SODIUM 0.1 MG/1
100 TABLET ORAL
Status: DISCONTINUED | OUTPATIENT
Start: 2024-02-27 | End: 2024-02-28

## 2024-02-27 RX ADMIN — PROPOFOL 55 MCG/KG/MIN: 10 INJECTION, EMULSION INTRAVENOUS at 20:52

## 2024-02-27 RX ADMIN — LABETALOL HYDROCHLORIDE 10 MG: 5 INJECTION, SOLUTION INTRAVENOUS at 19:45

## 2024-02-27 RX ADMIN — FENTANYL CITRATE 50 MCG: 50 INJECTION, SOLUTION INTRAMUSCULAR; INTRAVENOUS at 17:03

## 2024-02-27 RX ADMIN — HYDROMORPHONE HYDROCHLORIDE 0.5 MG: 1 INJECTION, SOLUTION INTRAMUSCULAR; INTRAVENOUS; SUBCUTANEOUS at 19:39

## 2024-02-27 RX ADMIN — ROCURONIUM BROMIDE 100 MG: 10 INJECTION, SOLUTION INTRAVENOUS at 18:26

## 2024-02-27 RX ADMIN — IOHEXOL 80 ML: 350 INJECTION, SOLUTION INTRAVENOUS at 21:57

## 2024-02-27 RX ADMIN — FENTANYL CITRATE 50 MCG: 50 INJECTION, SOLUTION INTRAMUSCULAR; INTRAVENOUS at 18:29

## 2024-02-27 RX ADMIN — FAMOTIDINE 20 MG: 10 INJECTION, SOLUTION INTRAVENOUS at 18:19

## 2024-02-27 RX ADMIN — SODIUM CHLORIDE: 9 INJECTION, SOLUTION INTRAVENOUS at 17:37

## 2024-02-27 RX ADMIN — PROPOFOL 10 MCG/KG/MIN: 10 INJECTION, EMULSION INTRAVENOUS at 19:12

## 2024-02-27 RX ADMIN — SODIUM CHLORIDE 1000 ML: 9 INJECTION, SOLUTION INTRAVENOUS at 19:03

## 2024-02-27 RX ADMIN — FENTANYL CITRATE 50 MCG: 50 INJECTION, SOLUTION INTRAMUSCULAR; INTRAVENOUS at 18:33

## 2024-02-27 RX ADMIN — INSULIN HUMAN 1 UNITS: 100 INJECTION, SOLUTION PARENTERAL at 19:00

## 2024-02-27 RX ADMIN — MIDAZOLAM 2.5 MG: 1 INJECTION INTRAMUSCULAR; INTRAVENOUS at 18:25

## 2024-02-27 RX ADMIN — PROPOFOL 80 MCG/KG/MIN: 10 INJECTION, EMULSION INTRAVENOUS at 22:45

## 2024-02-27 RX ADMIN — INSULIN HUMAN 2 UNITS: 100 INJECTION, SOLUTION PARENTERAL at 23:51

## 2024-02-27 ASSESSMENT — PAIN DESCRIPTION - PAIN TYPE
TYPE: ACUTE PAIN

## 2024-02-27 ASSESSMENT — FIBROSIS 4 INDEX: FIB4 SCORE: 2.1

## 2024-02-28 ENCOUNTER — APPOINTMENT (OUTPATIENT)
Dept: RADIOLOGY | Facility: MEDICAL CENTER | Age: 74
DRG: 004 | End: 2024-02-28
Attending: SURGERY
Payer: COMMERCIAL

## 2024-02-28 ENCOUNTER — APPOINTMENT (OUTPATIENT)
Dept: CARDIOLOGY | Facility: MEDICAL CENTER | Age: 74
DRG: 004 | End: 2024-02-28
Attending: NURSE PRACTITIONER
Payer: COMMERCIAL

## 2024-02-28 LAB
ALBUMIN SERPL BCP-MCNC: 3.4 G/DL (ref 3.2–4.9)
ALBUMIN/GLOB SERPL: 1.3 G/DL
ALP SERPL-CCNC: 60 U/L (ref 30–99)
ALT SERPL-CCNC: 32 U/L (ref 2–50)
ANION GAP SERPL CALC-SCNC: 12 MMOL/L (ref 7–16)
AST SERPL-CCNC: 28 U/L (ref 12–45)
BASOPHILS # BLD AUTO: 0.5 % (ref 0–1.8)
BASOPHILS # BLD: 0.04 K/UL (ref 0–0.12)
BILIRUB SERPL-MCNC: 0.3 MG/DL (ref 0.1–1.5)
BUN SERPL-MCNC: 16 MG/DL (ref 8–22)
CALCIUM ALBUM COR SERPL-MCNC: 9 MG/DL (ref 8.5–10.5)
CALCIUM SERPL-MCNC: 8.5 MG/DL (ref 8.5–10.5)
CHLORIDE SERPL-SCNC: 103 MMOL/L (ref 96–112)
CHOLEST SERPL-MCNC: 103 MG/DL (ref 100–199)
CO2 SERPL-SCNC: 20 MMOL/L (ref 20–33)
CREAT SERPL-MCNC: 0.61 MG/DL (ref 0.5–1.4)
EKG IMPRESSION: NORMAL
EOSINOPHIL # BLD AUTO: 0.05 K/UL (ref 0–0.51)
EOSINOPHIL NFR BLD: 0.6 % (ref 0–6.9)
ERYTHROCYTE [DISTWIDTH] IN BLOOD BY AUTOMATED COUNT: 45.1 FL (ref 35.9–50)
EST. AVERAGE GLUCOSE BLD GHB EST-MCNC: 183 MG/DL
GFR SERPLBLD CREATININE-BSD FMLA CKD-EPI: 100 ML/MIN/1.73 M 2
GLOBULIN SER CALC-MCNC: 2.7 G/DL (ref 1.9–3.5)
GLUCOSE BLD STRIP.AUTO-MCNC: 167 MG/DL (ref 65–99)
GLUCOSE BLD STRIP.AUTO-MCNC: 181 MG/DL (ref 65–99)
GLUCOSE BLD STRIP.AUTO-MCNC: 201 MG/DL (ref 65–99)
GLUCOSE BLD STRIP.AUTO-MCNC: 227 MG/DL (ref 65–99)
GLUCOSE BLD STRIP.AUTO-MCNC: 237 MG/DL (ref 65–99)
GLUCOSE SERPL-MCNC: 195 MG/DL (ref 65–99)
HBA1C MFR BLD: 8 % (ref 4–5.6)
HCT VFR BLD AUTO: 35.7 % (ref 42–52)
HDLC SERPL-MCNC: 32 MG/DL
HGB BLD-MCNC: 12.5 G/DL (ref 14–18)
IMM GRANULOCYTES # BLD AUTO: 0.01 K/UL (ref 0–0.11)
IMM GRANULOCYTES NFR BLD AUTO: 0.1 % (ref 0–0.9)
LDLC SERPL CALC-MCNC: 47 MG/DL
LYMPHOCYTES # BLD AUTO: 1.22 K/UL (ref 1–4.8)
LYMPHOCYTES NFR BLD: 14.6 % (ref 22–41)
MAGNESIUM SERPL-MCNC: 1.6 MG/DL (ref 1.5–2.5)
MCH RBC QN AUTO: 31.8 PG (ref 27–33)
MCHC RBC AUTO-ENTMCNC: 35 G/DL (ref 32.3–36.5)
MCV RBC AUTO: 90.8 FL (ref 81.4–97.8)
MONOCYTES # BLD AUTO: 1 K/UL (ref 0–0.85)
MONOCYTES NFR BLD AUTO: 11.9 % (ref 0–13.4)
NEUTROPHILS # BLD AUTO: 6.06 K/UL (ref 1.82–7.42)
NEUTROPHILS NFR BLD: 72.3 % (ref 44–72)
NRBC # BLD AUTO: 0 K/UL
NRBC BLD-RTO: 0 /100 WBC (ref 0–0.2)
PLATELET # BLD AUTO: 199 K/UL (ref 164–446)
PMV BLD AUTO: 9.5 FL (ref 9–12.9)
POTASSIUM SERPL-SCNC: 4.1 MMOL/L (ref 3.6–5.5)
PROT SERPL-MCNC: 6.1 G/DL (ref 6–8.2)
RBC # BLD AUTO: 3.93 M/UL (ref 4.7–6.1)
SODIUM SERPL-SCNC: 135 MMOL/L (ref 135–145)
T4 FREE SERPL-MCNC: 1.51 NG/DL (ref 0.93–1.7)
TRIGL SERPL-MCNC: 120 MG/DL (ref 0–149)
TROPONIN T SERPL-MCNC: 145 NG/L (ref 6–19)
TROPONIN T SERPL-MCNC: 256 NG/L (ref 6–19)
TROPONIN T SERPL-MCNC: 86 NG/L (ref 6–19)
WBC # BLD AUTO: 8.4 K/UL (ref 4.8–10.8)

## 2024-02-28 PROCEDURE — 700102 HCHG RX REV CODE 250 W/ 637 OVERRIDE(OP): Performed by: SURGERY

## 2024-02-28 PROCEDURE — 93005 ELECTROCARDIOGRAM TRACING: CPT

## 2024-02-28 PROCEDURE — 700105 HCHG RX REV CODE 258: Performed by: NURSE PRACTITIONER

## 2024-02-28 PROCEDURE — 93306 TTE W/DOPPLER COMPLETE: CPT

## 2024-02-28 PROCEDURE — 94799 UNLISTED PULMONARY SVC/PX: CPT

## 2024-02-28 PROCEDURE — 700111 HCHG RX REV CODE 636 W/ 250 OVERRIDE (IP): Performed by: NURSE PRACTITIONER

## 2024-02-28 PROCEDURE — 80061 LIPID PANEL: CPT

## 2024-02-28 PROCEDURE — 94003 VENT MGMT INPAT SUBQ DAY: CPT

## 2024-02-28 PROCEDURE — 85025 COMPLETE CBC W/AUTO DIFF WBC: CPT

## 2024-02-28 PROCEDURE — 80053 COMPREHEN METABOLIC PANEL: CPT

## 2024-02-28 PROCEDURE — 700102 HCHG RX REV CODE 250 W/ 637 OVERRIDE(OP)

## 2024-02-28 PROCEDURE — 83735 ASSAY OF MAGNESIUM: CPT

## 2024-02-28 PROCEDURE — 93306 TTE W/DOPPLER COMPLETE: CPT | Mod: 26 | Performed by: INTERNAL MEDICINE

## 2024-02-28 PROCEDURE — 83036 HEMOGLOBIN GLYCOSYLATED A1C: CPT

## 2024-02-28 PROCEDURE — 700102 HCHG RX REV CODE 250 W/ 637 OVERRIDE(OP): Performed by: NURSE PRACTITIONER

## 2024-02-28 PROCEDURE — A9270 NON-COVERED ITEM OR SERVICE: HCPCS | Performed by: SURGERY

## 2024-02-28 PROCEDURE — 84484 ASSAY OF TROPONIN QUANT: CPT | Mod: 91

## 2024-02-28 PROCEDURE — 99232 SBSQ HOSP IP/OBS MODERATE 35: CPT | Performed by: SURGERY

## 2024-02-28 PROCEDURE — 82962 GLUCOSE BLOOD TEST: CPT | Mod: 91

## 2024-02-28 PROCEDURE — 71045 X-RAY EXAM CHEST 1 VIEW: CPT

## 2024-02-28 PROCEDURE — 93010 ELECTROCARDIOGRAM REPORT: CPT | Performed by: INTERNAL MEDICINE

## 2024-02-28 PROCEDURE — 770022 HCHG ROOM/CARE - ICU (200)

## 2024-02-28 PROCEDURE — A9270 NON-COVERED ITEM OR SERVICE: HCPCS | Performed by: NURSE PRACTITIONER

## 2024-02-28 PROCEDURE — A9270 NON-COVERED ITEM OR SERVICE: HCPCS

## 2024-02-28 RX ORDER — METOPROLOL SUCCINATE 50 MG/1
50 TABLET, EXTENDED RELEASE ORAL DAILY
Status: DISCONTINUED | OUTPATIENT
Start: 2024-02-28 | End: 2024-02-28

## 2024-02-28 RX ORDER — ACETAMINOPHEN 325 MG/1
650 TABLET ORAL EVERY 4 HOURS PRN
Status: DISCONTINUED | OUTPATIENT
Start: 2024-02-28 | End: 2024-03-08

## 2024-02-28 RX ORDER — LEVOTHYROXINE SODIUM 0.1 MG/1
100 TABLET ORAL
Status: DISCONTINUED | OUTPATIENT
Start: 2024-02-29 | End: 2024-03-01

## 2024-02-28 RX ORDER — GABAPENTIN 300 MG/1
300 CAPSULE ORAL 2 TIMES DAILY
Status: DISCONTINUED | OUTPATIENT
Start: 2024-02-28 | End: 2024-03-11

## 2024-02-28 RX ORDER — ROSUVASTATIN CALCIUM 20 MG/1
20 TABLET, COATED ORAL DAILY
Status: DISCONTINUED | OUTPATIENT
Start: 2024-02-29 | End: 2024-03-11

## 2024-02-28 RX ADMIN — PROPOFOL 60 MCG/KG/MIN: 10 INJECTION, EMULSION INTRAVENOUS at 05:06

## 2024-02-28 RX ADMIN — GABAPENTIN 300 MG: 300 CAPSULE ORAL at 17:41

## 2024-02-28 RX ADMIN — PROPOFOL 50 MCG/KG/MIN: 10 INJECTION, EMULSION INTRAVENOUS at 17:34

## 2024-02-28 RX ADMIN — PROPOFOL 50 MCG/KG/MIN: 10 INJECTION, EMULSION INTRAVENOUS at 01:27

## 2024-02-28 RX ADMIN — PROPOFOL 50 MCG/KG/MIN: 10 INJECTION, EMULSION INTRAVENOUS at 23:52

## 2024-02-28 RX ADMIN — ACETAMINOPHEN 650 MG: 325 TABLET, FILM COATED ORAL at 21:46

## 2024-02-28 RX ADMIN — PROPOFOL 50 MCG/KG/MIN: 10 INJECTION, EMULSION INTRAVENOUS at 14:15

## 2024-02-28 RX ADMIN — METOPROLOL TARTRATE 25 MG: 25 TABLET, FILM COATED ORAL at 17:41

## 2024-02-28 RX ADMIN — GABAPENTIN 300 MG: 300 CAPSULE ORAL at 05:06

## 2024-02-28 RX ADMIN — INSULIN HUMAN 1 UNITS: 100 INJECTION, SOLUTION PARENTERAL at 12:55

## 2024-02-28 RX ADMIN — HYDROMORPHONE HYDROCHLORIDE 0.5 MG: 1 INJECTION, SOLUTION INTRAMUSCULAR; INTRAVENOUS; SUBCUTANEOUS at 02:26

## 2024-02-28 RX ADMIN — ACETAMINOPHEN 650 MG: 325 TABLET, FILM COATED ORAL at 17:42

## 2024-02-28 RX ADMIN — FAMOTIDINE 20 MG: 20 TABLET, FILM COATED ORAL at 17:40

## 2024-02-28 RX ADMIN — SODIUM CHLORIDE: 9 INJECTION, SOLUTION INTRAVENOUS at 01:28

## 2024-02-28 RX ADMIN — ROSUVASTATIN CALCIUM 20 MG: 20 TABLET, FILM COATED ORAL at 05:06

## 2024-02-28 RX ADMIN — INSULIN HUMAN 2 UNITS: 100 INJECTION, SOLUTION PARENTERAL at 05:24

## 2024-02-28 RX ADMIN — PROPOFOL 45 MCG/KG/MIN: 10 INJECTION, EMULSION INTRAVENOUS at 09:50

## 2024-02-28 RX ADMIN — HYDROMORPHONE HYDROCHLORIDE 0.5 MG: 1 INJECTION, SOLUTION INTRAMUSCULAR; INTRAVENOUS; SUBCUTANEOUS at 18:51

## 2024-02-28 RX ADMIN — DOCUSATE SODIUM 100 MG: 50 LIQUID ORAL at 05:06

## 2024-02-28 RX ADMIN — LEVOTHYROXINE SODIUM 100 MCG: 0.1 TABLET ORAL at 05:06

## 2024-02-28 RX ADMIN — PROPOFOL 55 MCG/KG/MIN: 10 INJECTION, EMULSION INTRAVENOUS at 20:40

## 2024-02-28 RX ADMIN — FAMOTIDINE 20 MG: 10 INJECTION, SOLUTION INTRAVENOUS at 05:06

## 2024-02-28 RX ADMIN — OXYCODONE 5 MG: 5 TABLET ORAL at 14:46

## 2024-02-28 RX ADMIN — INSULIN HUMAN 3 UNITS: 100 INJECTION, SOLUTION PARENTERAL at 23:39

## 2024-02-28 RX ADMIN — DOCUSATE SODIUM 50 MG AND SENNOSIDES 8.6 MG 1 TABLET: 8.6; 5 TABLET, FILM COATED ORAL at 20:34

## 2024-02-28 RX ADMIN — INSULIN HUMAN 1 UNITS: 100 INJECTION, SOLUTION PARENTERAL at 17:54

## 2024-02-28 ASSESSMENT — FIBROSIS 4 INDEX: FIB4 SCORE: 2.03

## 2024-02-28 ASSESSMENT — PAIN DESCRIPTION - PAIN TYPE
TYPE: ACUTE PAIN

## 2024-02-28 NOTE — CONSULTS
CARDIOLOGY CONSULTATION NOTE      Reason of Consult: Elevated troponins    Consulting Physician: Taz Ward MD     HPI:  Shaheen Peguero is a 73 YO M with a PMHx CAD with LAD stent 2021, afib on Xarelto, CHF with improved LVEF (28% in 2021, 65% in 2022), HLD, HTN, TIIDM, mitral regurgitation, severe LA dilation who was transferred from [place] on 2/27/24 for higher level of care in the setting of a syncopal episode where he reportedly walked in to a gas station and stated he felt unwell prior. He was found to have bilateral pneumothoraces and tracheal/cricoid cartilage injury. He had a GCS of 3 in the field and was thus intubated with chest tube placement. Upon presentation to [facility] he was found to have increased troponin 354 so cardiology was consulted. Due to EKG non-concerning for ACS it was initially recommended to continue trending troponin and  obtaining TTE.     Troponin trended from 354 -> 350 -> 256 -> 145. TSH is elevated at 5.8. Potassium is 5 and magnesium is 1.6. He has remained in normal sinus rhythm on telemetry since admission. Blood pressures have been soft while on propofol.     EKG (2/27/24):  I have personally reviewed the EKG this visit. It shows normal sinus rhythm with PVC and LAF block    EKG (2/28/24)  Normal sinus rhythm     Past Medical History:   Diagnosis Date    A-fib (HCC)     Acute nasopharyngitis     COVID Jan 1 2022    Allergy     Anemia     Arthritis     At risk for sleep apnea     Atrial fibrillation (HCC)     Blood clotting disorder (HCC)     DVT after R TKA 2012    Cataract     2014    Cervical radiculopathy 01/04/2022    Congestive heart failure (HCC) 2/27/2024 2020 SOB then Dx CHF 2021 [place] for 3 days       Diabetes (HCC) 2/27/2024 1996    Diabetic polyneuropathy associated with type 2 diabetes mellitus (HCC) 01/04/2022    Dilated cardiomyopathy (HCC)     Dyslipidemia 11/18/2022    Heart failure, congestive, etiology unknown (HCC)     Afib with Cardio version April  2021    High cholesterol 2/27/2024    Hypertension     Indigestion     Nonrheumatic mitral valve regurgitation 08/01/2022    Other specified injuries of thyroid gland, initial encounter 2/27/2024    Pneumonia     2015 double PNX 2 days in ICU    Pulmonary hypertension (HCC)     Urinary incontinence        Social History     Socioeconomic History    Marital status:      Spouse name: Cici    Number of children: 0    Years of education: 12    Highest education level: Not on file   Occupational History    Not on file   Tobacco Use    Smoking status: Former     Current packs/day: 1.00     Average packs/day: 1 pack/day for 20.0 years (20.0 ttl pk-yrs)     Types: Cigarettes    Smokeless tobacco: Former     Quit date: 1991   Vaping Use    Vaping Use: Never used   Substance and Sexual Activity    Alcohol use: Not Currently     Comment: Alcoholic  1990 Quit    Drug use: No    Sexual activity: Not Currently     Partners: Female     Birth control/protection: Abstinence, Condom   Other Topics Concern    Not on file   Social History Narrative    Not on file     Social Determinants of Health     Financial Resource Strain: Not on file   Food Insecurity: Not on file   Transportation Needs: Not on file   Physical Activity: Not on file   Stress: Not on file   Social Connections: Not on file   Intimate Partner Violence: Not on file   Housing Stability: Not on file       No current facility-administered medications on file prior to encounter.     Current Outpatient Medications on File Prior to Encounter   Medication Sig Dispense Refill    gabapentin (NEURONTIN) 300 MG Cap Take 1 Capsule by mouth 2 times a day. 120 Capsule 0    docusate sodium (COLACE) 100 MG Cap Take 1 Capsule by mouth 2 times a day. 60 Capsule 0    acetaminophen (TYLENOL) 500 MG Tab Take 2 Tablets by mouth every 8 hours. 90 Tablet 1    Sennosides (SENNA) 8.6 MG Cap Take 17.2 mg by mouth every day. (Patient not taking: Reported on 8/2/2022) 30 Capsule 0     ondansetron (ZOFRAN ODT) 4 MG TABLET DISPERSIBLE Take 1 Tablet by mouth every 8 hours as needed. (Patient not taking: Reported on 8/2/2022) 30 Tablet 0    furosemide (LASIX) 20 MG Tab Take 1 Tablet by mouth every day. Take in the morning      Nutritional Supplements (WELLNESS ESSENTIALS PO) Take 1 Tablet by mouth 2 times a day.      MELATONIN ER PO Take 3 mg by mouth at bedtime.      Insulin Glargine (LANTUS SC) Inject 25 mg under the skin at bedtime.      Ferrous Sulfate (IRON PO) Take 1 Capsule by mouth every day.      Ascorbic Acid (VITAMIN C PO) Take 1 Tablet by mouth every day at 6 PM.      rosuvastatin (CRESTOR) 40 MG tablet Take 0.5 Tablets by mouth every day. Patient takes 1/2 40mg tab      levothyroxine (SYNTHROID) 100 MCG Tab Take 1 Tablet by mouth every morning on an empty stomach.      metoprolol SR (TOPROL XL) 50 MG TABLET SR 24 HR Take 1 Tablet by mouth every day.      polyethylene glycol/lytes (MIRALAX) 17 g Pack Take 1 Packet by mouth 1 time a day as needed (for constipation).      magnesium oxide (MAG-OX) 400 MG Tab tablet Take 1 Tablet by mouth every day.      cyanocobalamin (VITAMIN B-12) 500 MCG Tab Take 1 Tablet by mouth every day.      vitamin D (CHOLECALCIFEROL) 1000 Unit (25 mcg) Tab Take 1 Tablet by mouth every day.      rivaroxaban (XARELTO) 20 MG Tab tablet Take 1 tablet by mouth with dinner. 30 tablet 0    metformin (GLUCOPHAGE) 500 MG TABS Take 2 Tablets by mouth 2 times a day with meals.      omeprazole (PRILOSEC) 20 MG delayed-release capsule Take 1 Capsule by mouth every day.      tamsulosin (FLOMAX) 0.4 MG capsule Take 1 Capsule by mouth at bedtime.         Current Facility-Administered Medications   Medication Dose Frequency Provider Last Rate Last Admin    metoprolol SR (Toprol XL) tablet 50 mg  50 mg DAILY YUDELKA RedmondPReneeRELSI        Respiratory Therapy Consult   Continuous RT YUDELKA SchwartzPELSI        Pharmacy Consult Request ...Pain Management Review 1 Each  1  Each PHARMACY TO DOSE Leann Beard A.P.N.        ondansetron (Zofran) syringe/vial injection 4 mg  4 mg Q4HRS PRN Leann Beard A.P.N.        senna-docusate (Pericolace Or Senokot S) 8.6-50 MG per tablet 1 Tablet  1 Tablet Nightly Leann Beard A.P.N.        senna-docusate (Pericolace Or Senokot S) 8.6-50 MG per tablet 1 Tablet  1 Tablet Q24HRS PRN Leann Beard, A.P.N.        polyethylene glycol/lytes (Miralax) Packet 1 Packet  1 Packet BID Leann Beard A.P.N.        magnesium hydroxide (Milk Of Magnesia) suspension 30 mL  30 mL DAILY Leann Beard A.P.N.        bisacodyl (Dulcolax) suppository 10 mg  10 mg Q24HRS PRN Leann Beard A.P.N.        sodium phosphate (Fleet) enema 133 mL  1 Each Once PRN Leann Beard, A.P.N.        Respiratory Therapy Consult   Continuous RT Leann Beard, A.P.N.        propofol (DIPRIVAN) injection  0-80 mcg/kg/min (Ideal) Continuous Leann Beard, A.P.N. 12 mL/hr at 02/28/24 0748 25 mcg/kg/min at 02/28/24 0748    famotidine (Pepcid) tablet 20 mg  20 mg BID Leann Beard, A.P.N.        Or    famotidine (Pepcid) injection 20 mg  20 mg BID Leann Beard, A.P.N.   20 mg at 02/28/24 0506    NS infusion   Continuous Leann Beard, A.P.N. 83 mL/hr at 02/28/24 0800 Rate Verify at 02/28/24 0800    oxyCODONE immediate-release (Roxicodone) tablet 5 mg  5 mg Q3HRS PRN Leann Beard, A.P.N.        Or    oxyCODONE immediate release (Roxicodone) tablet 10 mg  10 mg Q3HRS PRN Leann Beard, A.P.N.        Or    HYDROmorphone (Dilaudid) injection 0.5 mg  0.5 mg Q3HRS PRN Leann Beard, A.P.N.   0.5 mg at 02/28/24 0226    labetalol (Normodyne/Trandate) injection 10 mg  10 mg Q4HRS PRN Leann Beard, A.P.N.   10 mg at 02/27/24 1945    gabapentin (Neurontin) capsule 300 mg  300 mg BID Leann Beard, A.P.N.   300 mg at 02/28/24 0506    levothyroxine (Synthroid) tablet 100 mcg  100 mcg AM ES Leann Beard, A.P.N.   100 mcg  "at 02/28/24 0506    rosuvastatin (Crestor) tablet 20 mg  20 mg DAILY Leann Beard A.P.N.   20 mg at 02/28/24 0506    insulin regular (HumuLIN R,NovoLIN R) injection  1-6 Units Q6HRS Leann Beard A.P.N.   2 Units at 02/28/24 0524    And    dextrose 50% (D50W) injection 25 g  25 g Q15 MIN PRN Leann Beard A.P.NRenee        docusate sodium (Colace) oral solution 100 mg  100 mg BID Taz Ward M.D.   100 mg at 02/28/24 0506    Pharmacy Consult: Enteral tube insertion - review meds/change route/product selection   PHARMACY TO DOSE Taz Ward M.D.        ondansetron (Zofran ODT) dispertab 4 mg  4 mg Q4HRS PRN Taz Ward M.D.       Last reviewed on 2/27/2024 12:54 PM by Prakash Wong R.N.     Patient has no known allergies.    Family History   Problem Relation Age of Onset    Diabetes Father     Arthritis Father     Lung Disease Brother     Alcohol abuse Brother     Heart Disease Mother     Hypertension Brother     Alcohol abuse Brother        ROS: Unable to obtain complete ROS as patient is intubated and sedated however he shakes his head \"no\" when asked if having chest pain or pressure     Physical Exam   Blood pressure 136/62, pulse 61, temperature 37 °C (98.6 °F), temperature source Temporal, resp. rate 16, height 1.854 m (6' 1\"), weight 113 kg (250 lb), SpO2 96%.    Constitutional: Intubated, sedated. Appears well-developed.   HENT: C-collar in place. No scleral icterus.   Neck: No JVD present.   Cardiovascular: Normal rate. Regular rhythm. Exam reveals no gallop and no friction rub. No murmur heard.   Pulmonary/Chest: Left chest tube in place with sanguinous drainage   Abdominal: S/NT/ND BS+. Brooke in place  Musculoskeletal:  Pulses present. No atrophy. Strength normal.  Extremities: Exhibits no edema. No clubbing or cyanosis.   Skin: Skin is warm and dry.   Neuro: Follows commands, opens eyes to voice      Intake/Output Summary (Last 24 hours) at 2/28/2024 0846  Last data filed at 2/28/2024 " 0800  Gross per 24 hour   Intake 1407.2 ml   Output 2890 ml   Net -1482.8 ml       Recent Labs     02/27/24  1242 02/27/24  1658 02/28/24  0520   WBC 11.8* 12.2* 8.4   RBC 4.74 4.31* 3.93*   HEMOGLOBIN 14.6 13.5* 12.5*   HEMATOCRIT 45.2 40.1* 35.7*   MCV 95.4* 93.0 90.8   MCH 30.8 31.3 31.8   MCHC 32.3* 33.7 35.0   RDW 13.2 44.8 45.1   PLATELETCT 248 231 199   MPV 9.4 9.3 9.5     Recent Labs     02/27/24  1242 02/27/24  1658 02/28/24  0520   SODIUM 137 135 135   POTASSIUM 4.5 5.0 4.1   CHLORIDE 108* 103 103   CO2 18* 20 20   GLUCOSE 227* 208* 195*   BUN 20 20 16   CREATININE 0.9 0.75 0.61   CALCIUM 9.4 9.1 8.5     Recent Labs     02/27/24  1242 02/27/24  1658   APTT 26.8 29.4   INR 1.06 1.14*         Recent Labs     02/27/24  1242   TROPONINI <0.01     Recent Labs     02/27/24  1658   TRIGLYCERIDE 243*         Imaging reviewed    Impressions:  #Syncope and collapse  #Elevated troponin  #CAD  #Atrial fibrillation    Patient presented for syncopal event with low GCS in the setting of history of CAD with LAD stent 2021, afib on Xarelto, HF with improved LVEF who has been in sinus rhythm since admission with troponins that peaked at 354 but normal serial EKG. He has no evidence of volume overload on exam to suggest CHF exacerbation. He has bilateral pneumothoraces possibly due to chest trauma from his collapse, left worse than right; the troponin elevation may be due in part to the chest trauma as well, though acute coronary event is not out of the question. Given that he reportedly stated he felt unwell when walking in to the gas station it is possible that there is a tachyarrhythmic component to his syncope. Syncope may also be related to hypoxia in the setting of bilateral pneumothoraces if these had preceded his fall.         Recommendations:  -TTE to evaluate for progression of structural disease as well as wall motion abnormalities. If new wall motion abnormalities will consider cardiac cath  -Resume Xarelto when  cleared surgically (pending tracheostomy)  -Lipid panel and A1c ordered  -Telemetry monitoring  -Continue statin   -Check free T4  -Maintain K>4 and mag >2    Please contact us with any questions.     Discussed with the my attending Dr. Bam MD  PGY-2 Internal Medicine   [facility]

## 2024-02-28 NOTE — ASSESSMENT & PLAN NOTE
Chronic condition treated with Xarelto and Toprol Xl  EKG sinus rhythm.  Toprol XL resumed on admit.  Systemic anticoagulation held on admission.  3/8 Continued Xarelto

## 2024-02-28 NOTE — ASSESSMENT & PLAN NOTE
CT at [place] showing mildly displaced fracture of the left para midline aspect of the thyroid cartilage.  Bronchoscopy in ICU without evidence of tracheobronchial injury distal to the endotracheal tube however bloody sections noted proximally.  CT neck with thyroid cartilage fracture & suspect injury to cricoid cartilage.  3/2 Tracheostomy placement. Direct laryngoscopy and tracheoscopy.  3/9 Trach removed by Dr. Contreras.  Flaco Contreras MD. [facility].

## 2024-02-28 NOTE — ASSESSMENT & PLAN NOTE
Chronic condition treated with Insulin and metformin.  Holding maintenance metformin for 48 hours following intravenous contrast administration.  Insulin sliding scale coverage.  3/1 15 u lantus resumed.   3/2 lantus increase to 25 u  3/3 Start insulin gtt  3/5 Insulin gtt stopped.  3/6 Basilar insulin increased to 35 units qAM.  3/11 Initiated metformin.

## 2024-02-28 NOTE — DISCHARGE PLANNING
[facility] Social Work    MSW spoke with pt's spouse, Cici ([phone]) who called requesting an update on pt.  MSW provided [facility] with [facility]  Pt's [facility] call [facility] for [facility] medical update.

## 2024-02-28 NOTE — DIETARY
"Nutrition Support Assessment:  Day 1 of admit.  Parvez Peguero is a 74 y.o. male with admitting DX of Trauma and stressor-related disorder      Current problem list:  Diabetes  HX of chronic CHF  High cholesterol  Respiratory failure following trauma  Pneumothorax  Pneumomediastinum  Syncope and collapse  Elevated troponin  A-fib     Assessment:  Estimated Nutritional Needs based on:   Height: 185.4 cm (6' 0.99\")  Weight: 113 kg (250 lb) via bed scale on   Weight to Use in Calculations: 113 kg (250 lb)  Ideal Body Weight: 83.5 kg (184 lb)  Percent Ideal Body Weight: 135.9  Body mass index is 32.99 kg/m²., BMI classification: Obese Class I    Calculation/Equation: REE per MSJ: 1925 x 1.2 = 2310kcals  PSU (Tmax= 38.3C; VE= 9.1) = 2115kcals  Per critical care nutrition guidelines for obese patients  Total Calories / day: 1243 - 1582  (Calories / k-14)  Total Grams Protein / day per IBW: 125-167 (Grams Protein / k.5-2.0)     Evaluation:   Pt is currently intubated for airway protection and on propofol of 50mcg. Pt unable to receive nutrition by mouth at this time.   Pt had IRIS placed this am. Per RN note, tube appears to be in the stomach.  Per otolaryngology note today, Pt will likely need trach.   Labs : glu 195, A1c 8  Meds: SSI, pepcid, oxycodone PRN, BM protocol, prop @ 50mcg (634kcals)  Skin: No wounds; no edema  +BM:    Vital HP appropriate to meet pt's estimated protein needs while on propofol.     Malnutrition Risk: Unable to determine at this time.     Recommendations/Plan:  Initiate Vital HP at 25ml/hr and advance per protocol to goal rate of 65ml/hr which provide 1560kcals, 136g protein, and 1304ml free water/day. Same goal rate with and without propofol.  Fluids per MD.  Diet advancement when medically feasible per MD/SLP.  Monitor weights.    RD following.             "

## 2024-02-28 NOTE — PROGRESS NOTES
Received pt [id number] from ER.  BP cuff 169/88, RR: 22/m (vent), SpO2: 99%, Temp (Nolan): 98.6 deg. Neuro VIANNEY, pt intubated and sedated on propofol at 70mcg/kg/min. Left side chest tube to -20mmhg suction, incontinent of stool, PTA nolan in place. MD Sylvia at bedside orders received.

## 2024-02-28 NOTE — ASSESSMENT & PLAN NOTE
EKG sinus rhythm & left anterior fascicular block.  Trend troponin.   Echocardiogram with LVEF 50%.  Continuous cardiac monitoring  3/1 Some ectopy overnight self limited and seems to be asymptomatic, reviewed electrolytes WNL

## 2024-02-28 NOTE — H&P
CHIEF COMPLAINT: Concern for tracheal injury.     HISTORY OF PRESENT ILLNESS: The patient is a 74 year-old White elderly man who was injured in a fall. The patient experienced a possible syncopal event and ground-level fall. He had an unknown loss of consciousness. He was reportedly GCS 3 in the field and a supraglottic airway was placed. He was initially taken to [facility] where he was intubated. CT scan subsequently demonstrated bilateral pneumothoraces, left greater than right, as well as extensive pneumomediastinum and a thyroid cartilage fracture concerning for a tracheal injury. A left-sided chest tube was placed with resolution of his left pneumothorax, Ancef was given. An arterial line was placed for hemodynamic monitoring, a Brooke catheter was placed, and an orogastric tube placed. He was transferred to [facility] for higher level of care. The patient is chronically anticoagulated with rivaroxaban (Xarelto). It is unclear when his last dose was, he was not given Kcentra at [facility].  The patient is unable to articulate any subjective complaints.    TRIAGE CATEGORY: The patient was triaged as a Trauma Red Transfer Activation. The patient was initially evaluated at [facility] in [place], [place] where CT imaging demonstrated extensive subcutaneous emphysema and concern for a tracheal injury. He was transported to [facility] in [place], [place] for a Trauma Surgery trauma evaluation. An expeditious primary and secondary survey with required adjuncts was conducted. See Trauma Narrator for full details.    PAST MEDICAL HISTORY:  has a past medical history of A-fib ([facility]), Acute nasopharyngitis, Allergy, Anemia, Arthritis, At risk for sleep apnea, Atrial fibrillation ([facility]), Blood clotting disorder ([facility]), Cataract, Cervical radiculopathy (01/04/2022), Congestive heart failure ([facility]) (2/27/2024), Diabetes ([facility]) (2/27/2024), Diabetic polyneuropathy  associated with type 2 diabetes mellitus (HCC) (01/04/2022), Dilated cardiomyopathy (HCC), Dyslipidemia (11/18/2022), Heart failure, congestive, etiology unknown (HCC), High cholesterol (2/27/2024), Hypertension, Indigestion, Nonrheumatic mitral valve regurgitation (08/01/2022), Other specified injuries of thyroid gland, initial encounter (2/27/2024), Pneumonia, Pulmonary hypertension (HCC), and Urinary incontinence.    He has no past medical history of Addisons disease (HCC), Adrenal disorder (HCC), Anginal syndrome (HCC), Anxiety, Asthma, Blood transfusion without reported diagnosis, Breath shortness, Bronchitis, Cancer (HCC), Carcinoma in situ of respiratory system, Clotting disorder (HCC), COPD (chronic obstructive pulmonary disease) (HCC), Coughing blood, Cushings syndrome (HCC), Dental disorder, Depression, GERD (gastroesophageal reflux disease), Glaucoma, Goiter, Head ache, Heart attack (HCC), Heart murmur, HIV (human immunodeficiency virus infection) (HCC), IBD (inflammatory bowel disease), Kidney disease, Meningitis, Migraine, Osteoporosis, Parathyroid disorder (HCC), Pituitary disease (HCC), Pulmonary emphysema (HCC), Seizure (HCC), Sickle cell disease (HCC), Sleep apnea, Snoring, Stroke (HCC), Substance abuse (HCC), Tuberculosis, or Urinary tract infection.    PAST SURGICAL HISTORY:  has a past surgical history that includes arthroplasty (Right, 2012); eye surgery (Left, 2015); cardiovascular (2021); angiogram (2021); orif, fracture, humerus (Right, 2010); knee arthroplasty total (Right, 2012); pr colonoscopy,diagnostic (3/14/2022); and pr total knee arthroplasty (Left, 4/5/2022).    ALLERGIES: No Known Allergies    CURRENT MEDICATIONS:   Home Medications    **Home medications have not yet been reviewed for this encounter**       FAMILY HISTORY: family history includes Alcohol abuse in his brother and brother; Arthritis in his father; Diabetes in his father; Heart Disease in his mother; Hypertension in  "his brother; Lung Disease in his brother.    SOCIAL HISTORY:  reports that he has quit smoking. His smoking use included cigarettes. He has a 20 pack-year smoking history. He quit smokeless tobacco use about 33 years ago. He reports that he does not currently use alcohol. He reports that he does not use drugs.    REVIEW OF SYSTEMS: Comprehensive review of systems is not able to be elicited from the patient secondary to the acuity of the clinical situation.    PHYSICAL EXAMINATION:      Vital Signs: /66   Pulse (!) 106   Temp 37 °C (98.6 °F) (Temporal)   Resp (!) 24   Ht 1.854 m (6' 1\")   Wt 118 kg (260 lb 2.3 oz)   SpO2 100%   Physical Exam  Vitals reviewed.   Constitutional:       Appearance: He is not toxic-appearing.      Interventions: He is sedated, intubated and restrained.   HENT:      Head: Normocephalic and atraumatic.      Right Ear: External ear normal.      Left Ear: External ear normal.      Nose: Nose normal.      Mouth/Throat:      Comments: Endotracheal and orogastric tubes in place.  Eyes:      General: No scleral icterus.     Pupils: Pupils are equal, round, and reactive to light.   Neck:      Trachea: No tracheal deviation.      Comments: Small 2-3 mm wounds overlying the bilateral SCM muscles.  Cardiovascular:      Rate and Rhythm: Normal rate.   Pulmonary:      Effort: He is intubated.      Breath sounds: Normal breath sounds.   Chest:      Chest wall: No deformity or tenderness.      Comments: Left-sided chest tube in place, no air leak.  Abdominal:      General: There is no distension.      Palpations: Abdomen is soft.      Tenderness: There is no abdominal tenderness. There is no guarding or rebound.   Genitourinary:     Comments: Pelvis stable. Brooke catheter in place.  Musculoskeletal:      Cervical back: Crepitus present. No deformity or tenderness.      Thoracic back: No deformity or tenderness.      Lumbar back: No deformity or tenderness.   Skin:     General: Skin is warm " and dry.      Capillary Refill: Capillary refill takes less than 2 seconds.      Coloration: Skin is not jaundiced.   Neurological:      General: No focal deficit present.      GCS: GCS eye subscore is 1. GCS verbal subscore is 1. GCS motor subscore is 5.      Comments: GCS 7T.         LABORATORY VALUES:   Recent Labs     02/27/24  1242 02/27/24  1658   WBC 11.8* 12.2*   RBC 4.74 4.31*   HEMOGLOBIN 14.6 13.5*   HEMATOCRIT 45.2 40.1*   MCV 95.4* 93.0   MCH 30.8 31.3   MCHC 32.3* 33.7   RDW 13.2 44.8   PLATELETCT 248 231   MPV 9.4 9.3     Recent Labs     02/27/24  1242 02/27/24  1658   SODIUM 137 135   POTASSIUM 4.5 5.0   CHLORIDE 108* 103   CO2 18* 20   GLUCOSE 227* 208*   BUN 20 20   CREATININE 0.9 0.75   CALCIUM 9.4 9.1     Recent Labs     02/27/24  1242 02/27/24  1658   ASTSGOT 51* 40   ALTSGPT 54 41   TBILIRUBIN 0.5 0.4   ALKPHOSPHAT 60 67   GLOBULIN  --  3.0   INR 1.06 1.14*     Recent Labs     02/27/24  1242 02/27/24  1658   APTT 26.8 29.4   INR 1.06 1.14*        IMAGING:   CT-SOFT TISSUE NECK WITH   Final Result         1. There is fracture of the thyroid cartilage, along with a suspected injury to the cricoid cartilage. Soft tissue density surrounds the ET tube within the larynx and injury is suspected.   2. Subcutaneous emphysema throughout the neck extending inferiorly. Pneumomediastinum      DX-CHEST-LIMITED (1 VIEW)   Final Result         1.  Pneumomediastinum. Soft tissue gas in the lower neck.   2.  Small pneumothorax on the left is suspected. Left chest tube is in place.      US-ABORTED US PROCEDURE    (Results Pending)   EC-ECHOCARDIOGRAM COMPLETE W/O CONT    (Results Pending)   DX-CHEST-PORTABLE (1 VIEW)    (Results Pending)       ASSESSMENT AND PLAN:     A-fib (HCC)  Chronic condition treated with Xarelto and Toprol Xl  EKG pending.  Holding maintenance medication during acute traumatic illness.    History of chronic CHF  Per chart review.   History of dilated cardiomyopathy    Diabetes  ([facility])  Chronic condition treated with Insulin and metformin.  Holding maintenance metformin for 48 hours following intravenous contrast administration.  Insulin sliding scale coverage.      High cholesterol  Chronic condition treated with Crestor  Resumed maintenance medication on admission.    Respiratory failure following trauma ([facility])  Intubated at [facility].  Continue full mechanical ventilatory support. Ventilator bundle and Trauma weaning protocol.    Trauma  Walked into gas station stated he didn't feel well then had syncopal episode.  Trauma Red Transfer Activation from [facility] in [place], [place].  Taz Ward MD. Trauma Surgery.    Pneumothorax on left  Left chest tube placed by [facility].  To continuous suction  Serial chest X Ray's     Fracture closed, thyroid cartilage, initial encounter ([facility])  CT at [facility] showing mildly displaced fracture of the left para midline aspect of the thyroid cartilage.  Bronchoscopy in ICU without evidence of tracheobronchial injury distal to the endotracheal tube however bloody sections noted proximally.  CT neck pending.  Definitive plan pending. May require tracheostomy.  Flaco Contreras MD. [facility].    Pneumomediastinum ([facility])  Extensive gas is noted within the superficial and deep soft tissue planes of the upper chest and neck.  Opacification of a 4 cm segment of the trachea, distal to the cords, surrounding the endotracheal tube is present, and could represent a manifestation of tracheal injury.  CT neck pending.  ENT and GI consulted for concern for tracheal/esophageal injuries.  Flaco Contreras MD. [facility].  Don Armstrong MD. [facility].    Syncope and collapse  EKG and trop pending.   Echo pending     Elevated troponin  Admission troponin level 354.  EKG with SR without acute changes.  Otis Ni MD, Cardiology    DISPOSITION: Trauma ICU.  Interval Trauma tertiary survey.    CRITICAL CARE  TIME: My full attention was spent providing medically necessary critical care to the patient, exclusive of time spent on any procedures, for 45 minutes, with details documented in my note.  The patient has acute impairment of one or more vital organ systems and a high probability of imminent or life-threatening deterioration in condition. Provided high complexity decision making to assess, manipulate, and support vital system functions to treat vital organ system failure and/or to prevent further life-threatening deterioration of the patient's condition. Requires continued ICU and hospital admission.    Critical care interventions include: integration of multiple data points and associated complex medical decision making, active ventilator management, and management of thrombotic surveillance and risk mitigation.         ____________________________________     Taz Ward M.D.    DD: 2/27/2024  5:20 PM

## 2024-02-28 NOTE — CONSULTS
I was called to discuss this patients care with Dr. Ward. We discussed EKG findings, elevated troponin.    I have personally interpreted EKG today with patient, there is no evidence of acute coronary syndrome, no evidence of prior infarct, normal AR and QT interval, no significant conduction disease. Sinus rhythm. + PVCs.    Continue to trend troponin.  Await TTE.    Rounding cardiology team will see patient in AM.    Electronically Signed by:    Geovany Ni M.D.      2/27/2024    8:18 PM

## 2024-02-28 NOTE — ASSESSMENT & PLAN NOTE
Admission troponin level 354.  EKG with SR without acute changes.  3/3 Cardiology recommendation:  Heparin gtt, ASA 81, and Xarelto when feasible.  Secretions from trach are no longer bloody.  Heparin gtt and ASA started  3/8 Continues on Xarelto  Otis To, MD, Cardiology

## 2024-02-28 NOTE — PROGRESS NOTES
1718: Pt from ED [facility] to [id number] accompanied by ED TNTL, Trauma Resource RN's, and Trauma Surgeon Dr Wadr on transport ventilator and zoll. Pt incontinent of stool on arrival to ICU.    Gtts infusing on arrival (see MAR for titrations): Propofol at 70 mcg/kg/min.    VS on arrival:  HR: 93 BPM  BP (cuff): 169/88  RR: 22/m (vent)  SpO2: 99%  Temp (Nolan): 98.6 deg. F  Weight (bed scale): 112.8 kg    Pt arrives with IV catheter x2, arterial line, nolan catheter and OGT already in place from referring hospital.     4 Eyes Skin Exam completed by MITA Garcia and latrice Martinez RN. Transcribed as follows:   Head: WDL.  Ears: WDL.  Nose: WDL barring old blood residue present in nares - no active bleeding.   Mouth: WDL. ETT in place.   Neck: C-collar in place, not removed at this time for exam. Subcutaneous air and swelling appreciated as well as bilateral (2) red marks to anterior neck.   Breast/Chest: Subcutaneous air bilaterally (nipple-line and up), L chest tube in place.   Shoulder Blades: WDL.  Spine: WDL.  (R) Arm/Elbow/Hand: Abrasion to dorsal hand, oozing. Abrasion to knuckle of 3rd finger as well. Dressing with coban in place to R wrist (appears to be old arterial catheterization attempt). IV in place.   (L) Arm/Elbow/Hand: Abrasion to third finger. IV and arterial line also in place (dressed).  Abdomen: WDL.  Groin: WDL.  Scrotum/Coccyx/Buttocks: WDL.  (R) Leg: Scar (to knee).   (L) Leg: Scar (to knee).   (R) Heel/Foot/Toe: Bruising to nailbed of great toe. Heel pink, boggy, blanching and dry.  (L) Heel/Foot/Toe: Heel pink, boggy, blanching, dry.     Devices In Place: ECG, Blood Pressure Cuff, Pulse Ox, Nolan, Arterial Line, SCD's, ET Tube, OG/NG, and Cervical Collar.  Interventions In Place: Sacral Mepilex, TAP System, Pillows, Q2 Turns, Low Air Loss Mattress, and Heels Loaded W/Pillows.  Possible Skin Injury: No.  Pictures Uploaded Into Epic: Yes. See below.     Wound Consult Placed: N/A - per primary  RN discretion. Not indicated at this time.   RN Wound Prevention Protocol Ordered: Yes.     Pt belongings inventory:  Pair black rubber boots, gold ring (removed from pt and placed in specimen cup), FitBit with olive band, pair of tan socks, pair of glasses, 's license (placed in wallet), mobile phone, wallet (contains $108 in cash and various cards), car keys, hearing aid (1), boxer shorts, pair of jeans (Listerine strips, coins, and TUMS in pocket), .     1730: EKG at bedside.     1733: Dr. Ward at bedside. Plan for bronchoscopy. Brooke catheter exchanged for [facility] facility catheter.

## 2024-02-28 NOTE — ASSESSMENT & PLAN NOTE
Left chest tube placed by [facility].  2/29 Water seal chest tube.  3/1 Will keep chest tube until definitive management of airway  3/2 CXR in AM, if trace pneumothorax is stable, remove chest tube  3/3 CXR without pneumo, chest tube removal.  Serial CXRs

## 2024-02-28 NOTE — PROGRESS NOTES
Iris Placement    Tube Team verified patient name and medical record number prior to tube placement. Iris feeding tube (55 inches, 10 Swazi) placed at 73cm in right nare. Per Iris picture, tube appears to be in the stomach.    Nursing Instructions: Await KUB to confirm placement before use for medications or feeding. Stylet, may be removed, please place in labeled bag with insufflation bulb and save in patient medication drawer.

## 2024-02-28 NOTE — ASSESSMENT & PLAN NOTE
Walked into gas station stated he didn't feel well then had syncopal episode.  Trauma Red Transfer Activation from [facility] in [place], [place].  Taz Ward MD. Trauma Surgery.

## 2024-02-28 NOTE — PROGRESS NOTES
Spoke with Dr Ward last evening about possible egd given concerns over tracheal injury.  CT neck reviewed.  I advise involvement of ENT.  Upper endoscopy through the region may very well induce more injury.   Please reconsult as needed.  EGD not advised at this time.  Recommend ENT eval.

## 2024-02-28 NOTE — DISCHARGE PLANNING
Case Management Discharge Planning    Admission Date: 2/27/2024  GMLOS: 5.2  ALOS: 1    6-Clicks ADL Score:    6-Clicks Mobility Score:        Anticipated Discharge Dispo: Discharge Disposition: Disch to IP rehab facility or distinct part unit (62)  Discharge Contact Phone Number: [phone]    Action(s) Taken:   Per [facility], patient's PCP is Dr. Bobby Mcdowell (last visit 11-14-23).  Pt has Medicare A & B.  No service connected disability, no ACP docs.   RN SHAN met with patient and spouse, Cici at bedside.    They live in a 1 story house with their dog in [place].  Pt is independent and active.  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  Cici stated he has been doing well and has not been sick.  She stated that she saw her  yesterday morning.  He left the house and so did he.  She arrived back to the home by 1pm.  At 3pm, patient had not returned.  She worried and then subsequently received a call from [facility] of his admission.    Medically Clear: No    Next Steps:   Collaborate with patient, spouse and medical team.    Barriers to Discharge: Medical clearance and Pending Placement    Care Transition Team Assessment    Information Source  Orientation Level: Unable to assess  Information Given By: Spouse  Who is responsible for making decisions for patient? : Spouse  Name(s) of Primary Decision Maker: Cicirogelio Peguero    Readmission Evaluation  Is this a readmission?: No    Elopement Risk  Legal Hold: No  Ambulatory or Self Mobile in Wheelchair: No-Not an Elopement Risk         Discharge Preparedness  What is your plan after discharge?: Uncertain - pending medical team collaboration  What are your discharge supports?: Spouse  Prior Functional Level: Ambulatory, Drives Self, Independent with Activities of Daily Living, Independent with Medication Management  Difficulity with ADLs: Bathing, Brushing teeth, Dressing, Eating, Toileting, Walking  Difficulity with IADLs: Cooking,  Driving, Keeping track of finances, Laundry, Managing medication, Shopping, Using the telephone or computer    Functional Assesment  Prior Functional Level: Ambulatory, Drives Self, Independent with Activities of Daily Living, Independent with Medication Management    Finances  Financial Barriers to Discharge: No  Prescription Coverage: Yes              Advance Directive  Advance Directive?: None         Psychological Assessment  History of Substance Abuse: None  History of Psychiatric Problems: No    Discharge Risks or Barriers  Discharge risks or barriers?: No  Patient risk factors: Cognitive / sensory / physical deficit    Anticipated Discharge Information  Discharge Disposition: Disch to  rehab facility or distinct part unit (62)  Discharge Contact Phone Number: [phone]

## 2024-02-28 NOTE — ED NOTES
Propofol from outside facility switched to [facility] medication and gtt running at 50 mcg/kg/min per IBW.    IBW: 79.9kg

## 2024-02-28 NOTE — CARE PLAN
The patient is Watcher - Medium risk of patient condition declining or worsening    Shift Goals  Clinical Goals: stable hemodynamics; stable oxygenation; TTE; ENT consult; ECHO  Patient Goals: VIANNEY  Family Goals: VIANNEY    Progress made toward(s) clinical / shift goals:    Problem: Knowledge Deficit - Standard  Goal: Patient and family/care givers will demonstrate understanding of plan of care, disease process/condition, diagnostic tests and medications  Outcome: Progressing     Problem: Hemodynamics  Goal: Patient's hemodynamics, fluid balance and neurologic status will be stable or improve  Outcome: Progressing     Problem: Respiratory  Goal: Patient will achieve/maintain optimum respiratory ventilation and gas exchange  Outcome: Progressing     Problem: Safety - Medical Restraint  Goal: Remains free of injury from restraints (Restraint for Interference with Medical Device)  Outcome: Met  Goal: Free from restraint(s) (Restraint for Interference with Medical Device)  Outcome: Met     Problem: Pain - Standard  Goal: Alleviation of pain or a reduction in pain to the patient’s comfort goal  Outcome: Met     Problem: Skin Integrity  Goal: Skin integrity is maintained or improved  Outcome: Met     Problem: Mechanical Ventilation  Goal: Safe management of artificial airway and ventilation  Outcome: Met

## 2024-02-28 NOTE — CARE PLAN
The patient is Watcher - Medium risk of patient condition declining or worsening       Progress made toward(s) clinical / shift goals:      Problem: Knowledge Deficit - Standard  Goal: Patient and family/care givers will demonstrate understanding of plan of care, disease process/condition, diagnostic tests and medications  Outcome: Progressing     Problem: Safety - Medical Restraint  Goal: Remains free of injury from restraints (Restraint for Interference with Medical Device)  Outcome: Progressing  Goal: Free from restraint(s) (Restraint for Interference with Medical Device)  Outcome: Progressing     Problem: Pain - Standard  Goal: Alleviation of pain or a reduction in pain to the patient’s comfort goal  Outcome: Progressing     Problem: Skin Integrity  Goal: Skin integrity is maintained or improved  Outcome: Progressing     Problem: Hemodynamics  Goal: Patient's hemodynamics, fluid balance and neurologic status will be stable or improve  Outcome: Progressing     Problem: Respiratory  Goal: Patient will achieve/maintain optimum respiratory ventilation and gas exchange  Outcome: Progressing     Problem: Mechanical Ventilation  Goal: Safe management of artificial airway and ventilation  Outcome: Progressing  Goal: Successful weaning off mechanical ventilator, spontaneously maintains adequate gas exchange  Outcome: Progressing  Goal: Patient will be able to express needs and understand communication  Outcome: Progressing

## 2024-02-28 NOTE — CARE PLAN
Problem: Ventilation  Goal: Ability to achieve and maintain unassisted ventilation or tolerate decreased levels of ventilator support  Description: Target End Date:  4 days     Document on Vent flowsheet    1.  Support and monitor invasive and noninvasive mechanical ventilation  2.  Monitor ventilator weaning response  3.  Perform ventilator associated pneumonia prevention interventions  4.  Manage ventilation therapy by monitoring diagnostic test results  Outcome: Not Met     Ventilator Daily Summary    Vent Day #2  Airway: 8.0/27    Ventilator settings: %, +8, 40%  Vent Mode: ASV  Rate (breaths/min):  [22] 22  PEEP/CPAP:  [8] 8  PIP:  [15-20] 17  MAP:  [9.6-12] 11      Plan: Continue current ventilator settings and wean mechanical ventilation as tolerated per physician orders.

## 2024-02-28 NOTE — PROGRESS NOTES
1824: Dr. Ward at the bedside for bronchoscopy. Implied consent per MD order. Time out - all agree.     1825: 2.5 mg Midazolam administered per MD direction.  1826: 100 mg Rocuronium administered per MD direction.  1829: 50 mcg Fentanyl administered per MD direction. *Note: The Fentanyl given was ordered at the bedside, but pulled by PharmD in trauma room and sent up for use during the bronch per MD direction. Thus, Fentanyl not pulled from TICU [facility], already present with Trauma Resource RN on arrival to TICU.  1833: 50 mcg Fentanyl (subsequent dose, same vial) administered per MD direction.    1836: Procedure end.     Please see procedure navigator for details.

## 2024-02-28 NOTE — DISCHARGE PLANNING
Trauma Response    Referral: Trauma Red Response    Intervention: SW responded to trauma Red.  Pt was BIB [facility] after syncope.  Pt was intubated upon arrival.  Pts name is Parvez Peguero (: 1950).  SW obtained the following pt information: Per EMS pt was transferred from [facility], pt was at a gas station and told the attendant that he didn't feel well and fell in syncope.  Pt was intubated at [facility] before being transferred.  KAREEM was able to contact pts wife: Cici [phone].  Pts wife states she is aware that he is here but she can't come tonight as she is unable to drive in the dark and lives in [facility] [place].  Pts wife states she will be here tomorrow morning.  KAREEM provided direct phone number to SW dept so pts wife can have a point of contact to find pt tomorrow.    Plan: SW will remain available to assist as necessary.

## 2024-02-28 NOTE — ASSESSMENT & PLAN NOTE
Extensive gas is noted within the superficial and deep soft tissue planes of the upper chest and neck.  Opacification of a 4 cm segment of the trachea, distal to the cords, surrounding the endotracheal tube is present, and could represent a manifestation of tracheal injury.  CT neck with subcutaneous emphysema & pneumomediastinum.  ENT and GI consulted for concern for tracheal/esophageal injuries.  Flaco Contreras MD. [facility].  Don Armstrong MD. [facility].

## 2024-02-28 NOTE — ED NOTES
Patient YAZAN Vargasar #6 as trauma RED transfer from [facility]. 74 y.o. male involved in GLF outside gas station in [facility] related to suspected syncope. Unknown head strike/LOC, + thinners (Xarelto). Reported airway compromise, tracheal injury w/ poss thyroid cartilage fx and subcutaneous air per outside evaluation. Intubated at [place]. + bilateral pneumothoraces and PNA as well per report.     Patient arrives w/ *no spinal immobilizations* in place. Full spinal precautions adhered to upon moving/transferring patient in trauma room.     Medications administered en route: 2g Ancef, 4 mg Midazolam, 200 mcg Fentanyl (+ gtt started), 150 mg Rocuronium 40 mg Etomidate for RSI, Propofol gtt at 50 mcg/kg/min at [facility]. Received 200 mg total Ketamine en route, propofol gtt continued en route to [facility].     Home Medications: Please see medical chart for details.   Allergies: Unknown.  Medical Hx: HTN, CAD, atrial fibrillation, systolic HF, DM2, orthopedic surgery (knee). Please see medical chart for further details.

## 2024-02-28 NOTE — ASSESSMENT & PLAN NOTE
Intubated at [place].  3/1 Endoscopic evaluation of airway and trach placement today with ENT  3/2  Switch to trach collar, tolerating  3/3 Tolerating t piece.  3/6 Downsized to 6 noncuffed by ENT.  Keflex initiated for purulent drainage, 7 days.  3/9 Trach removed by Dr. Contreras. FEEs pending.  3/11 FEEs completed. Diet advanced.  3/12 Tolerating diet. Eating 50-75%. Denies choking or vomiting.    Tracheostomy to be managed by ENT.  Not to be decannulated this admission.  Can follow up with ENT as an outpatient for decannulation when appropriate

## 2024-02-28 NOTE — PROGRESS NOTES
The patient's new neck scan was reviewed and briefly examined at the bed side.  Still in c-collar, neck soft.  Ct-left paramedian thyroid cartilage fracture sparing the anterior commissure, probable left paramedian cricoid cartilage fracture as well with some but not severe narrowing of ap diameter of airway at level of cricoid.  Neither fracture obviously comminuted, but the thyroid fracture mildy distracted but not displaced.  Will discuss with partners.  At a minimum I think the patient will need tracheostomy.

## 2024-02-28 NOTE — ED NOTES
Pt to [id number] accompanied by ED TNTL, trauma resource nurses, and trauma surgery. Trauma END (unable to file end time).

## 2024-02-28 NOTE — PROGRESS NOTES
"      Mental status adequate for full examination?: No    Spine cleared (radiologically and/or clinically): Cervical CT negative for fracture.    REVIEW OF SYSTEMS:  Review of Systems   Unable to perform ROS: Intubated     PHYSICAL EXAMINATION:  /67   Pulse 62   Temp 37 °C (98.6 °F) (Temporal)   Resp 15   Ht 1.854 m (6' 1\")   Wt 113 kg (250 lb)   SpO2 98%   BMI 32.98 kg/m²     Physical Exam  Constitutional:       Appearance: He is obese. He is ill-appearing. He is not diaphoretic.      Interventions: He is sedated, intubated and restrained.   HENT:      Head: Normocephalic and atraumatic.      Right Ear: External ear normal.      Left Ear: External ear normal.      Nose: Nose normal.      Mouth/Throat:      Comments: Orogastric tube in place.  Eyes:      Pupils: Pupils are equal, round, and reactive to light.   Cardiovascular:      Rate and Rhythm: Normal rate and regular rhythm.   Pulmonary:      Effort: No respiratory distress. He is intubated.      Breath sounds: Normal breath sounds.   Chest:      Comments: Left chest tube intact without air leak appreciated.  Abdominal:      General: There is no distension.      Palpations: Abdomen is soft.   Genitourinary:     Comments: Brooke in place with clear yellow urine.  Musculoskeletal:         General: No swelling or deformity.      Cervical back: Crepitus present.      Right lower leg: No edema.      Left lower leg: No edema.   Skin:     General: Skin is warm and dry.      Capillary Refill: Capillary refill takes less than 2 seconds.   Neurological:      GCS: GCS eye subscore is 3. GCS verbal subscore is 1. GCS motor subscore is 5.      Comments: GCS 9T       LABORATORY VALUES:  Recent Labs     02/27/24  1242 02/27/24  1658 02/28/24  0520   WBC 11.8* 12.2* 8.4   RBC 4.74 4.31* 3.93*   HEMOGLOBIN 14.6 13.5* 12.5*   HEMATOCRIT 45.2 40.1* 35.7*   MCV 95.4* 93.0 90.8   MCH 30.8 31.3 31.8   MCHC 32.3* 33.7 35.0   RDW 13.2 44.8 45.1   PLATELETCT 248 231 199 "   MPV 9.4 9.3 9.5     Recent Labs     02/27/24  1242 02/27/24  1658 02/28/24  0520   SODIUM 137 135 135   POTASSIUM 4.5 5.0 4.1   CHLORIDE 108* 103 103   CO2 18* 20 20   GLUCOSE 227* 208* 195*   BUN 20 20 16   CREATININE 0.9 0.75 0.61   CALCIUM 9.4 9.1 8.5     Recent Labs     02/27/24  1242 02/27/24  1658 02/28/24  0520   ASTSGOT 51* 40 28   ALTSGPT 54 41 32   TBILIRUBIN 0.5 0.4 0.3   ALKPHOSPHAT 60 67 60   GLOBULIN  --  3.0 2.7   INR 1.06 1.14*  --      Recent Labs     02/27/24  1242 02/27/24  1658   APTT 26.8 29.4   INR 1.06 1.14*       IMAGING:  EC-ECHOCARDIOGRAM COMPLETE W/O CONT         DX-CHEST-PORTABLE (1 VIEW)   Final Result      Stable exam. Large amount of soft tissue emphysema and pneumomediastinum redemonstrated.      CT-SOFT TISSUE NECK WITH   Final Result         1. There is fracture of the thyroid cartilage, along with a suspected injury to the cricoid cartilage. Soft tissue density surrounds the ET tube within the larynx and injury is suspected.   2. Subcutaneous emphysema throughout the neck extending inferiorly. Pneumomediastinum      DX-CHEST-LIMITED (1 VIEW)   Final Result         1.  Pneumomediastinum. Soft tissue gas in the lower neck.   2.  Small pneumothorax on the left is suspected. Left chest tube is in place.      US-ABORTED US PROCEDURE    (Results Pending)   DX-ABDOMEN FOR TUBE PLACEMENT    (Results Pending)     RAP Score Total: 10    CAGE Results: not completed Blood Alcohol>0.08: no     All current laboratory studies/radiology exams reviewed: Yes    Medications reconciliation has been reviewed: Yes    Completed Consultations:  MD Dr. Don Hernandez MD, Gastroenterology    Pending Consultations:  Dr. Flaco Contreras MD, ENT    Newly identified diagnoses, injuries and/or co-morbidities:  None noted at time of exam.    Discussed patient condition with trauma surgery, Dr. Ward.

## 2024-02-28 NOTE — PROCEDURES
DATE OF OPERATION:   2/27/2024     PREOPERATIVE DIAGNOSIS: suspected tracheobronchial injury    POSTOPERATIVE DIAGNOSIS: suspected tracheobronchial injury    PROCEDURE PERFORMED: Diagnostic flexible fiberoptic bronchoscopy.    SURGEON:   Taz Ward M.D.    ANESTHESIA:  Anesthesia consisting of intravenous sedation, parenteral analgesia, and pharmacologic restraint was administered.    INDICATIONS: The patient is a 74 year-old White elderly man with acute respiratory failure, suspected tracheobronchial injury. Diagnostic flexible fiberoptic bronchoscopy is performed in the ICU.    FINDINGS:  Normal anatomy.  Bloody Secretions: the distal trachea, at the lillie, right mainstem bronchus, right lower lobe bronchus, and left mainstem bronchus.  No tracheobronchial injury noted distal to the endotracheal tube. Some blood visualized through the endotracheal tube proximally in the neck, the endotracheal tube was not withdrawn to evaluate this area.    SPECIMEN:  None    PROCEDURE: Following implied emergent consent, the patient was properly identified and optimally positioned in bed. He was preoxygenated with 100% oxygen and placed on a regular ventilatory rate. Anesthesia consisting of intravenous sedation, parenteral analgesia, and pharmacologic restraint was administered. A timeout was conducted with the full attention and participation of all procedure personnel.    The fiberoptic bronchoscope was advanced through the endotracheal tube. The airways were inspected with findings detailed above.      The patient tolerated the procedure well. There were no apparent complications.         ____________________________________     Taz Ward M.D.    DD: 2/27/2024  6:39 PM

## 2024-02-28 NOTE — PROGRESS NOTES
"  Trauma / Surgical Daily Progress Note    Date of Service  2/28/2024    Chief Complaint  74 y.o. male admitted 2/27/2024 with laryngeal fracture and minor myocardial event    Interval Events  CRITICAL CARE DECISION MAKING:    Patient examined and discussed with team at bedside.    Addressed pulmonary hygiene concerns as well as oxygenation/ventilation.   Patient needs to remain intubated for airway protection.  Sedation and restraints as well.  Based on review of imaging, examination of the patient and ENT note, patient unlikely to require any formal reconstruction but will likely need tracheostomy while this heals.  Plan pending    Patient appears to have stabilized from a cardiac point of view.  Troponins are coming down quickly.  Cardiology has seen the patient reviewed his history and medications and offers no interventions at this time.    Labs reviewed, electrolytes addressed, renal function assessed  Reviewed nutrition strategies, recent indices: Iris placed for tube feeding protocol  Addressed GI prophylaxis and bowel frequency Pepcid and bowel protocol  Assessed/discussed/titrated analgesics and need for sedatives: Sedated with propofol to avoid airway dislodgment.  As needed analgesics  Addressed DVT prophylaxis: Lovenox and SCDs  Addressed line days, nolan catheter days, access needs: Nolan catheter.  DC A-line as it is not working  Addressed family and discharge concerns: Family updated at bedside      Review of Systems  Review of Systems   Unable to perform ROS: Intubated        Vital Signs for last 24 hours  Temp:  [37 °C (98.6 °F)] 37 °C (98.6 °F)  Pulse:  [] 67  Resp:  [12-40] 15  BP: ()/(54-99) 158/67  SpO2:  [95 %-100 %] 96 %    Hemodynamic parameters for last 24 hours   BP (!) 158/67   Pulse 67   Temp 37 °C (98.6 °F) (Temporal)   Resp 15   Ht 1.854 m (6' 0.99\")   Wt 113 kg (250 lb)   SpO2 96%   BMI 32.99 kg/m²     Respiratory Data     Respiration: 15, Pulse Oximetry: 96 %   "   Work Of Breathing / Effort: Vented  RUL Breath Sounds: Clear, RML Breath Sounds: Clear;Diminished, RLL Breath Sounds: Diminished, WINSTON Breath Sounds: Clear;Diminished, LLL Breath Sounds: Diminished    Physical Exam  Physical Exam  Vitals and nursing note reviewed.   Constitutional:       General: He is sleeping.      Interventions: He is sedated, intubated and restrained. Cervical collar in place.   HENT:      Head: Normocephalic and atraumatic.      Nose:      Comments: NGT     Mouth/Throat:      Mouth: Mucous membranes are moist.      Pharynx: Oropharynx is clear.   Eyes:      Conjunctiva/sclera: Conjunctivae normal.      Pupils: Pupils are equal, round, and reactive to light.   Neck:      Comments: Anterior neck swelling with crepitus.  No gross instability  Small bruises lateral to the midline bilaterally  Cardiovascular:      Rate and Rhythm: Normal rate and regular rhythm.      Pulses: Normal pulses.   Pulmonary:      Effort: Pulmonary effort is normal. He is intubated.      Breath sounds: No stridor. No rhonchi.   Abdominal:      General: There is no distension.      Palpations: Abdomen is soft.      Tenderness: There is no abdominal tenderness.   Genitourinary:     Comments: Brooke  Musculoskeletal:         General: Normal range of motion.      Right lower leg: No edema.      Left lower leg: No edema.   Neurological:      General: No focal deficit present.      Mental Status: He is easily aroused.      GCS: GCS eye subscore is 2. GCS verbal subscore is 1. GCS motor subscore is 5.   Psychiatric:         Behavior: Behavior is uncooperative.         Laboratory  Recent Results (from the past 24 hour(s))   Triglycerides Starting now and then Every 3 Days    Collection Time: 02/27/24  4:58 PM   Result Value Ref Range    Triglycerides 243 (H) 0 - 149 mg/dL   DIAGNOSTIC ALCOHOL    Collection Time: 02/27/24  4:58 PM   Result Value Ref Range    Diagnostic Alcohol <10.1 <10.1 mg/dL   Comp Metabolic Panel    Collection  Time: 02/27/24  4:58 PM   Result Value Ref Range    Sodium 135 135 - 145 mmol/L    Potassium 5.0 3.6 - 5.5 mmol/L    Chloride 103 96 - 112 mmol/L    Co2 20 20 - 33 mmol/L    Anion Gap 12.0 7.0 - 16.0    Glucose 208 (H) 65 - 99 mg/dL    Bun 20 8 - 22 mg/dL    Creatinine 0.75 0.50 - 1.40 mg/dL    Calcium 9.1 8.5 - 10.5 mg/dL    Correct Calcium 9.3 8.5 - 10.5 mg/dL    AST(SGOT) 40 12 - 45 U/L    ALT(SGPT) 41 2 - 50 U/L    Alkaline Phosphatase 67 30 - 99 U/L    Total Bilirubin 0.4 0.1 - 1.5 mg/dL    Albumin 3.8 3.2 - 4.9 g/dL    Total Protein 6.8 6.0 - 8.2 g/dL    Globulin 3.0 1.9 - 3.5 g/dL    A-G Ratio 1.3 g/dL   CBC WITHOUT DIFFERENTIAL    Collection Time: 02/27/24  4:58 PM   Result Value Ref Range    WBC 12.2 (H) 4.8 - 10.8 K/uL    RBC 4.31 (L) 4.70 - 6.10 M/uL    Hemoglobin 13.5 (L) 14.0 - 18.0 g/dL    Hematocrit 40.1 (L) 42.0 - 52.0 %    MCV 93.0 81.4 - 97.8 fL    MCH 31.3 27.0 - 33.0 pg    MCHC 33.7 32.3 - 36.5 g/dL    RDW 44.8 35.9 - 50.0 fL    Platelet Count 231 164 - 446 K/uL    MPV 9.3 9.0 - 12.9 fL   Prothrombin Time    Collection Time: 02/27/24  4:58 PM   Result Value Ref Range    PT 14.7 (H) 12.0 - 14.6 sec    INR 1.14 (H) 0.87 - 1.13   APTT    Collection Time: 02/27/24  4:58 PM   Result Value Ref Range    APTT 29.4 24.7 - 36.0 sec   PLATELET MAPPING WITH BASIC TEG    Collection Time: 02/27/24  4:58 PM   Result Value Ref Range    Reaction Time Initial-R 4.9 4.6 - 9.1 min    React Time Initial Hep 4.8 4.3 - 8.3 min    Clot Kinetics-K 1.4 0.8 - 2.1 min    Clot Angle-Angle 70.6 63.0 - 78.0 degrees    Maximum Clot Strength-MA 60.5 52.0 - 69.0 mm    TEG Functional Fibrinogen(MA) 22.5 15.0 - 32.0 mm    Lysis 30 minutes-LY30 1.4 0.0 - 2.6 %    % Inhibition ADP 26.6 (H) 0.0 - 17.0 %    % Inhibition AA 2.6 0.0 - 11.0 %    TEG Algorithm Link Algorithm    LACTIC ACID    Collection Time: 02/27/24  4:58 PM   Result Value Ref Range    Lactic Acid 1.1 0.5 - 2.0 mmol/L   TROPONIN    Collection Time: 02/27/24  4:58 PM    Result Value Ref Range    Troponin T 354 (H) 6 - 19 ng/L   ARTERIAL BLOOD GAS    Collection Time: 24  4:58 PM   Result Value Ref Range    Ph 7.33 (L) 7.40 - 7.50    Pco2 40.2 (H) 26.0 - 37.0 mmHg    Po2 301.7 (H) 64.0 - 87.0 mmHg    O2 Saturation 99.1 (H) 93.0 - 99.0 %    Hco3 21 17 - 25 mmol/L    Base Excess -5 (L) -4 - 3 mmol/L    Body Temp 37.0 Centigrade    O2 Therapy 100     FIO2 100 (H) 30 - 60 %    Ph -TC 7.33 (L) 7.40 - 7.50    Pco2 -TC 40.2 (H) 26.0 - 37.0 mmHg    Po2 -.7 (H) 64.0 - 87.0 mmHg   COD - Adult (Type and Screen)    Collection Time: 24  4:58 PM   Result Value Ref Range    ABO Grouping Only A     Rh Grouping Only POS     Antibody Screen-Cod NEG    TSH    Collection Time: 24  4:58 PM   Result Value Ref Range    TSH 5.820 (H) 0.380 - 5.330 uIU/mL   ESTIMATED GFR    Collection Time: 24  4:58 PM   Result Value Ref Range    GFR (CKD-EPI) 94 >60 mL/min/1.73 m 2   FREE THYROXINE    Collection Time: 24  4:58 PM   Result Value Ref Range    Free T-4 1.51 0.93 - 1.70 ng/dL   ABO Rh Confirm    Collection Time: 24  5:09 PM   Result Value Ref Range    ABO Rh Confirm A POS    EKG    Collection Time: 24  5:30 PM   Result Value Ref Range    Report       [facility]    Test Date:  2024  Pt Name:    ROSY CALVIN             Department: ER  MRN:        [id number]                      Room:       T914  Gender:     Male                         Technician: TX  :        1950                   Requested By:ROB MATTHEWS  Order #:    [id number]                    Reading MD: Mio Crawley MD    Measurements  Intervals                                Axis  Rate:       87                           P:          59  CO:         166                          QRS:        -42  QRSD:       107                          T:          66  QT:         368  QTc:        443    Interpretive Statements  Sinus rhythm  Ventricular premature complex  Left anterior fascicular  block    Electronically Signed On 02- 19:07:32 PST by Mio Crawley MD     POCT glucose device results    Collection Time: 02/27/24  6:56 PM   Result Value Ref Range    POC Glucose, Blood 187 (H) 65 - 99 mg/dL   TROPONIN    Collection Time: 02/27/24  8:12 PM   Result Value Ref Range    Troponin T 350 (H) 6 - 19 ng/L   POCT arterial blood gas device results    Collection Time: 02/27/24  8:24 PM   Result Value Ref Range    Ph 7.432 7.400 - 7.500    Pco2 29.7 26.0 - 37.0 mmHg    Po2 136 (H) 64 - 87 mmHg    Tco2 21 20 - 33 mmol/L    S02 99 93 - 99 %    Hco3 19.8 17.0 - 25.0 mmol/L    BE -3 -4 - 3 mmol/L    Body Temp 99.3 F degrees    O2 Therapy 50 %    iPF Ratio 272     Ph Temp Cassandra 7.426 7.400 - 7.500    Pco2 Temp Co 30.2 26.0 - 37.0 mmHg    Po2 Temp Cor 139 (H) 64 - 87 mmHg    Specimen Arterial     Todd Test N/A     DelSys Vent     End Tidal Carbon Dioxide 30 mmhg    Tidal Volume 490 mL    Peep End Expiratory Pressure 8 cmh20    Set Rate 22     Mode APV-CMV    POCT lactate device results    Collection Time: 02/27/24  8:24 PM   Result Value Ref Range    iStat Lactate 0.7 0.5 - 2.0 mmol/L   TROPONIN    Collection Time: 02/27/24 11:48 PM   Result Value Ref Range    Troponin T 256 (H) 6 - 19 ng/L   POCT glucose device results    Collection Time: 02/27/24 11:50 PM   Result Value Ref Range    POC Glucose, Blood 237 (H) 65 - 99 mg/dL   CBC with Differential: Tomorrow AM    Collection Time: 02/28/24  5:20 AM   Result Value Ref Range    WBC 8.4 4.8 - 10.8 K/uL    RBC 3.93 (L) 4.70 - 6.10 M/uL    Hemoglobin 12.5 (L) 14.0 - 18.0 g/dL    Hematocrit 35.7 (L) 42.0 - 52.0 %    MCV 90.8 81.4 - 97.8 fL    MCH 31.8 27.0 - 33.0 pg    MCHC 35.0 32.3 - 36.5 g/dL    RDW 45.1 35.9 - 50.0 fL    Platelet Count 199 164 - 446 K/uL    MPV 9.5 9.0 - 12.9 fL    Neutrophils-Polys 72.30 (H) 44.00 - 72.00 %    Lymphocytes 14.60 (L) 22.00 - 41.00 %    Monocytes 11.90 0.00 - 13.40 %    Eosinophils 0.60 0.00 - 6.90 %    Basophils 0.50 0.00 - 1.80  %    Immature Granulocytes 0.10 0.00 - 0.90 %    Nucleated RBC 0.00 0.00 - 0.20 /100 WBC    Neutrophils (Absolute) 6.06 1.82 - 7.42 K/uL    Lymphs (Absolute) 1.22 1.00 - 4.80 K/uL    Monos (Absolute) 1.00 (H) 0.00 - 0.85 K/uL    Eos (Absolute) 0.05 0.00 - 0.51 K/uL    Baso (Absolute) 0.04 0.00 - 0.12 K/uL    Immature Granulocytes (abs) 0.01 0.00 - 0.11 K/uL    NRBC (Absolute) 0.00 K/uL   Comp Metabolic Panel (CMP): Tomorrow AM    Collection Time: 02/28/24  5:20 AM   Result Value Ref Range    Sodium 135 135 - 145 mmol/L    Potassium 4.1 3.6 - 5.5 mmol/L    Chloride 103 96 - 112 mmol/L    Co2 20 20 - 33 mmol/L    Anion Gap 12.0 7.0 - 16.0    Glucose 195 (H) 65 - 99 mg/dL    Bun 16 8 - 22 mg/dL    Creatinine 0.61 0.50 - 1.40 mg/dL    Calcium 8.5 8.5 - 10.5 mg/dL    Correct Calcium 9.0 8.5 - 10.5 mg/dL    AST(SGOT) 28 12 - 45 U/L    ALT(SGPT) 32 2 - 50 U/L    Alkaline Phosphatase 60 30 - 99 U/L    Total Bilirubin 0.3 0.1 - 1.5 mg/dL    Albumin 3.4 3.2 - 4.9 g/dL    Total Protein 6.1 6.0 - 8.2 g/dL    Globulin 2.7 1.9 - 3.5 g/dL    A-G Ratio 1.3 g/dL   TROPONIN    Collection Time: 02/28/24  5:20 AM   Result Value Ref Range    Troponin T 145 (H) 6 - 19 ng/L   ESTIMATED GFR    Collection Time: 02/28/24  5:20 AM   Result Value Ref Range    GFR (CKD-EPI) 100 >60 mL/min/1.73 m 2   MAGNESIUM    Collection Time: 02/28/24  5:20 AM   Result Value Ref Range    Magnesium 1.6 1.5 - 2.5 mg/dL   Lipid Profile    Collection Time: 02/28/24  5:20 AM   Result Value Ref Range    Cholesterol,Tot 103 100 - 199 mg/dL    Triglycerides 120 0 - 149 mg/dL    HDL 32 (A) >=40 mg/dL    LDL 47 <100 mg/dL   HEMOGLOBIN A1C    Collection Time: 02/28/24  5:20 AM   Result Value Ref Range    Glycohemoglobin 8.0 (H) 4.0 - 5.6 %    Est Avg Glucose 183 mg/dL   POCT glucose device results    Collection Time: 02/28/24  5:22 AM   Result Value Ref Range    POC Glucose, Blood 201 (H) 65 - 99 mg/dL   EKG    Collection Time: 02/28/24  9:06 AM   Result Value Ref  Range    Report       [facility]    Test Date:  2024  Pt Name:    ROSY CALVIN             Department: [facility]  MRN:        [id number]                      Room:       T917  Gender:     Male                         Technician: ALEXIS  :        1950                   Requested By:MELVI LOZANO  Order #:    [id number]                    Reading MD: Manuelito Yoo MD    Measurements  Intervals                                Axis  Rate:       63                           P:          69  MT:         159                          QRS:        -34  QRSD:       105                          T:          29  QT:         441  QTc:        452    Interpretive Statements  Sinus arrhythmia  Left anterior fasicular block  Electronically Signed On 2024 09:22:31 PST by Manuelito Yoo MD     EC-ECHOCARDIOGRAM COMPLETE W/O CONT    Collection Time: 24 11:37 AM   Result Value Ref Range    Left Ventrical Ejection Fraction 50    TROPONIN    Collection Time: 24 11:40 AM   Result Value Ref Range    Troponin T 86 (H) 6 - 19 ng/L   POCT glucose device results    Collection Time: 24 12:52 PM   Result Value Ref Range    POC Glucose, Blood 167 (H) 65 - 99 mg/dL       Fluids    Intake/Output Summary (Last 24 hours) at 2024 1514  Last data filed at 2024 1200  Gross per 24 hour   Intake 1443.96 ml   Output 3055 ml   Net -1611.04 ml       Core Measures & Quality Metrics  EKG reviewed, Labs reviewed and Medications reviewed  Brooke catheter: Critically Ill - Requiring Accurate Measurement of Urinary Output      DVT Prophylaxis: Enoxaparin (Lovenox)  DVT prophylaxis - mechanical: SCDs  Ulcer prophylaxis: Yes    Assessed for rehab: Patient unable to tolerate rehabilitation therapeutic regimen    KATHYA Score  ETOH Screening    Assessment/Plan  Syncope and collapse- (present on admission)  Assessment & Plan  EKG sinus rhythm & left anterior fascicular block.  Trend troponin.   Echocardiogram with LVEF  50%.  Continuous cardiac monitoring.    Fracture closed, thyroid cartilage, initial encounter (HCC)- (present on admission)  Assessment & Plan  CT at [facility] showing mildly displaced fracture of the left para midline aspect of the thyroid cartilage.  Bronchoscopy in ICU without evidence of tracheobronchial injury distal to the endotracheal tube however bloody sections noted proximally.  CT neck with thyroid cartilage fracture & suspect injury to cricoid cartilage.  Definitive plan pending. May require tracheostomy.  Flaco Contreras MD. [facility].    Pneumomediastinum ([facility])- (present on admission)  Assessment & Plan  Extensive gas is noted within the superficial and deep soft tissue planes of the upper chest and neck.  Opacification of a 4 cm segment of the trachea, distal to the cords, surrounding the endotracheal tube is present, and could represent a manifestation of tracheal injury.  CT neck with subcutaneous emphysema & pneumomediastinum.  ENT and GI consulted for concern for tracheal/esophageal injuries.  Flaco Contreras MD. [facility].  Don Armstrong MD. [facility].    Pneumothorax on left- (present on admission)  Assessment & Plan  Left chest tube placed by [facility].  To continuous suction  Serial chest X Ray's     Respiratory failure following trauma ([facility])- (present on admission)  Assessment & Plan  Intubated at [facility].  Continue full mechanical ventilatory support. Ventilator bundle and Trauma weaning protocol.    Elevated troponin- (present on admission)  Assessment & Plan  Admission troponin level 354.  EKG with SR without acute changes.  Otis Ni MD, Cardiology    History of chronic CHF- (present on admission)  Assessment & Plan  Per chart review.   History of dilated cardiomyopathy  2/28 Echocardiogram with LVEF 50%.    Diabetes ([facility])- (present on admission)  Assessment & Plan  Chronic condition treated with Insulin and metformin.  Holding  maintenance metformin for 48 hours following intravenous contrast administration.  Insulin sliding scale coverage.    A-fib (HCC)- (present on admission)  Assessment & Plan  Chronic condition treated with Xarelto and Toprol Xl  EKG sinus rhythm.  Toprol XL resumed on admit.  Systemic anticoagulation held on admission.    Trauma- (present on admission)  Assessment & Plan  Walked into gas station stated he didn't feel well then had syncopal episode.  Trauma Red Transfer Activation from [facility] in [place], [place].  Taz Ward MD. Trauma Surgery.    High cholesterol- (present on admission)  Assessment & Plan  Chronic condition treated with Crestor  Resumed maintenance medication on admission.        Discussed patient condition with Family, RN, RT, Pharmacy, , and trauma surgery.  CRITICAL CARE TIME EXCLUDING PROCEDURES: 39    minutes

## 2024-02-29 ENCOUNTER — APPOINTMENT (OUTPATIENT)
Dept: RADIOLOGY | Facility: MEDICAL CENTER | Age: 74
DRG: 004 | End: 2024-02-29
Attending: SURGERY
Payer: COMMERCIAL

## 2024-02-29 PROBLEM — Z78.9 NO CONTRAINDICATION TO DEEP VEIN THROMBOSIS (DVT) PROPHYLAXIS: Status: ACTIVE | Noted: 2024-02-29

## 2024-02-29 LAB
ALBUMIN SERPL BCP-MCNC: 3.4 G/DL (ref 3.2–4.9)
ALBUMIN/GLOB SERPL: 1.3 G/DL
ALP SERPL-CCNC: 58 U/L (ref 30–99)
ALT SERPL-CCNC: 21 U/L (ref 2–50)
ANION GAP SERPL CALC-SCNC: 11 MMOL/L (ref 7–16)
AST SERPL-CCNC: 19 U/L (ref 12–45)
BASOPHILS # BLD AUTO: 0.4 % (ref 0–1.8)
BASOPHILS # BLD: 0.04 K/UL (ref 0–0.12)
BILIRUB SERPL-MCNC: 0.6 MG/DL (ref 0.1–1.5)
BUN SERPL-MCNC: 16 MG/DL (ref 8–22)
CALCIUM ALBUM COR SERPL-MCNC: 8.8 MG/DL (ref 8.5–10.5)
CALCIUM SERPL-MCNC: 8.3 MG/DL (ref 8.5–10.5)
CHLORIDE SERPL-SCNC: 104 MMOL/L (ref 96–112)
CO2 SERPL-SCNC: 21 MMOL/L (ref 20–33)
CREAT SERPL-MCNC: 0.71 MG/DL (ref 0.5–1.4)
CRP SERPL HS-MCNC: 8.48 MG/DL (ref 0–0.75)
EOSINOPHIL # BLD AUTO: 0.1 K/UL (ref 0–0.51)
EOSINOPHIL NFR BLD: 1 % (ref 0–6.9)
ERYTHROCYTE [DISTWIDTH] IN BLOOD BY AUTOMATED COUNT: 46 FL (ref 35.9–50)
GFR SERPLBLD CREATININE-BSD FMLA CKD-EPI: 96 ML/MIN/1.73 M 2
GLOBULIN SER CALC-MCNC: 2.7 G/DL (ref 1.9–3.5)
GLUCOSE BLD STRIP.AUTO-MCNC: 218 MG/DL (ref 65–99)
GLUCOSE BLD STRIP.AUTO-MCNC: 229 MG/DL (ref 65–99)
GLUCOSE BLD STRIP.AUTO-MCNC: 255 MG/DL (ref 65–99)
GLUCOSE BLD STRIP.AUTO-MCNC: 263 MG/DL (ref 65–99)
GLUCOSE SERPL-MCNC: 258 MG/DL (ref 65–99)
HCT VFR BLD AUTO: 36.9 % (ref 42–52)
HGB BLD-MCNC: 12.4 G/DL (ref 14–18)
IMM GRANULOCYTES # BLD AUTO: 0.03 K/UL (ref 0–0.11)
IMM GRANULOCYTES NFR BLD AUTO: 0.3 % (ref 0–0.9)
LV EJECT FRACT  99904: 50
LYMPHOCYTES # BLD AUTO: 0.83 K/UL (ref 1–4.8)
LYMPHOCYTES NFR BLD: 8.7 % (ref 22–41)
MAGNESIUM SERPL-MCNC: 1.8 MG/DL (ref 1.5–2.5)
MCH RBC QN AUTO: 31.3 PG (ref 27–33)
MCHC RBC AUTO-ENTMCNC: 33.6 G/DL (ref 32.3–36.5)
MCV RBC AUTO: 93.2 FL (ref 81.4–97.8)
MONOCYTES # BLD AUTO: 0.91 K/UL (ref 0–0.85)
MONOCYTES NFR BLD AUTO: 9.5 % (ref 0–13.4)
NEUTROPHILS # BLD AUTO: 7.64 K/UL (ref 1.82–7.42)
NEUTROPHILS NFR BLD: 80.1 % (ref 44–72)
NRBC # BLD AUTO: 0 K/UL
NRBC BLD-RTO: 0 /100 WBC (ref 0–0.2)
PHOSPHATE SERPL-MCNC: 2.7 MG/DL (ref 2.5–4.5)
PLATELET # BLD AUTO: 165 K/UL (ref 164–446)
PMV BLD AUTO: 9.6 FL (ref 9–12.9)
POTASSIUM SERPL-SCNC: 4.2 MMOL/L (ref 3.6–5.5)
PREALB SERPL-MCNC: 18.2 MG/DL (ref 18–38)
PROT SERPL-MCNC: 6.1 G/DL (ref 6–8.2)
RBC # BLD AUTO: 3.96 M/UL (ref 4.7–6.1)
SODIUM SERPL-SCNC: 136 MMOL/L (ref 135–145)
TRIGL SERPL-MCNC: 153 MG/DL (ref 0–149)
WBC # BLD AUTO: 9.6 K/UL (ref 4.8–10.8)

## 2024-02-29 PROCEDURE — 80053 COMPREHEN METABOLIC PANEL: CPT

## 2024-02-29 PROCEDURE — 82962 GLUCOSE BLOOD TEST: CPT | Mod: 91

## 2024-02-29 PROCEDURE — 700111 HCHG RX REV CODE 636 W/ 250 OVERRIDE (IP): Mod: JZ

## 2024-02-29 PROCEDURE — 700102 HCHG RX REV CODE 250 W/ 637 OVERRIDE(OP): Performed by: INTERNAL MEDICINE

## 2024-02-29 PROCEDURE — 770022 HCHG ROOM/CARE - ICU (200)

## 2024-02-29 PROCEDURE — 83735 ASSAY OF MAGNESIUM: CPT

## 2024-02-29 PROCEDURE — 700102 HCHG RX REV CODE 250 W/ 637 OVERRIDE(OP): Performed by: NURSE PRACTITIONER

## 2024-02-29 PROCEDURE — A9270 NON-COVERED ITEM OR SERVICE: HCPCS | Performed by: SURGERY

## 2024-02-29 PROCEDURE — 86140 C-REACTIVE PROTEIN: CPT

## 2024-02-29 PROCEDURE — 71045 X-RAY EXAM CHEST 1 VIEW: CPT

## 2024-02-29 PROCEDURE — 700102 HCHG RX REV CODE 250 W/ 637 OVERRIDE(OP): Performed by: SURGERY

## 2024-02-29 PROCEDURE — 84478 ASSAY OF TRIGLYCERIDES: CPT

## 2024-02-29 PROCEDURE — 94799 UNLISTED PULMONARY SVC/PX: CPT

## 2024-02-29 PROCEDURE — 700102 HCHG RX REV CODE 250 W/ 637 OVERRIDE(OP)

## 2024-02-29 PROCEDURE — A9270 NON-COVERED ITEM OR SERVICE: HCPCS

## 2024-02-29 PROCEDURE — A9270 NON-COVERED ITEM OR SERVICE: HCPCS | Performed by: INTERNAL MEDICINE

## 2024-02-29 PROCEDURE — 99232 SBSQ HOSP IP/OBS MODERATE 35: CPT | Performed by: SURGERY

## 2024-02-29 PROCEDURE — 84100 ASSAY OF PHOSPHORUS: CPT

## 2024-02-29 PROCEDURE — 84134 ASSAY OF PREALBUMIN: CPT

## 2024-02-29 PROCEDURE — 700111 HCHG RX REV CODE 636 W/ 250 OVERRIDE (IP): Performed by: NURSE PRACTITIONER

## 2024-02-29 PROCEDURE — 94003 VENT MGMT INPAT SUBQ DAY: CPT

## 2024-02-29 PROCEDURE — 85025 COMPLETE CBC W/AUTO DIFF WBC: CPT

## 2024-02-29 PROCEDURE — A9270 NON-COVERED ITEM OR SERVICE: HCPCS | Performed by: NURSE PRACTITIONER

## 2024-02-29 PROCEDURE — 700105 HCHG RX REV CODE 258: Performed by: NURSE PRACTITIONER

## 2024-02-29 RX ORDER — ENOXAPARIN SODIUM 100 MG/ML
40 INJECTION SUBCUTANEOUS EVERY 12 HOURS
Status: DISCONTINUED | OUTPATIENT
Start: 2024-02-29 | End: 2024-03-03

## 2024-02-29 RX ORDER — LISINOPRIL 5 MG/1
5 TABLET ORAL
Status: DISCONTINUED | OUTPATIENT
Start: 2024-02-29 | End: 2024-03-11

## 2024-02-29 RX ORDER — DEXTROSE MONOHYDRATE 25 G/50ML
25 INJECTION, SOLUTION INTRAVENOUS
Status: DISCONTINUED | OUTPATIENT
Start: 2024-02-29 | End: 2024-03-03

## 2024-02-29 RX ORDER — MAGNESIUM OXIDE 420 MG/1
420 TABLET ORAL DAILY
COMMUNITY

## 2024-02-29 RX ORDER — ROSUVASTATIN CALCIUM 20 MG/1
20 TABLET, COATED ORAL EVERY EVENING
COMMUNITY
End: 2024-03-15

## 2024-02-29 RX ORDER — LISINOPRIL 2.5 MG/1
2.5 TABLET ORAL DAILY
COMMUNITY

## 2024-02-29 RX ORDER — LEVOTHYROXINE SODIUM 0.07 MG/1
75 TABLET ORAL
COMMUNITY
End: 2024-03-15

## 2024-02-29 RX ADMIN — GABAPENTIN 300 MG: 300 CAPSULE ORAL at 17:15

## 2024-02-29 RX ADMIN — PROPOFOL 60 MCG/KG/MIN: 10 INJECTION, EMULSION INTRAVENOUS at 17:16

## 2024-02-29 RX ADMIN — DOCUSATE SODIUM 100 MG: 50 LIQUID ORAL at 05:54

## 2024-02-29 RX ADMIN — GABAPENTIN 300 MG: 300 CAPSULE ORAL at 05:54

## 2024-02-29 RX ADMIN — METOPROLOL TARTRATE 25 MG: 25 TABLET, FILM COATED ORAL at 05:55

## 2024-02-29 RX ADMIN — INSULIN HUMAN 3 UNITS: 100 INJECTION, SOLUTION PARENTERAL at 05:48

## 2024-02-29 RX ADMIN — POLYETHYLENE GLYCOL 3350 1 PACKET: 17 POWDER, FOR SOLUTION ORAL at 05:54

## 2024-02-29 RX ADMIN — MAGNESIUM HYDROXIDE 30 ML: 1200 LIQUID ORAL at 05:54

## 2024-02-29 RX ADMIN — PROPOFOL 60 MCG/KG/MIN: 10 INJECTION, EMULSION INTRAVENOUS at 03:54

## 2024-02-29 RX ADMIN — FAMOTIDINE 20 MG: 20 TABLET, FILM COATED ORAL at 17:16

## 2024-02-29 RX ADMIN — ROSUVASTATIN CALCIUM 20 MG: 20 TABLET, FILM COATED ORAL at 05:55

## 2024-02-29 RX ADMIN — FAMOTIDINE 20 MG: 20 TABLET, FILM COATED ORAL at 05:55

## 2024-02-29 RX ADMIN — ACETAMINOPHEN 650 MG: 325 TABLET, FILM COATED ORAL at 13:17

## 2024-02-29 RX ADMIN — METOPROLOL TARTRATE 25 MG: 25 TABLET, FILM COATED ORAL at 17:16

## 2024-02-29 RX ADMIN — INSULIN HUMAN 4 UNITS: 100 INJECTION, SOLUTION PARENTERAL at 17:26

## 2024-02-29 RX ADMIN — DOCUSATE SODIUM 100 MG: 50 LIQUID ORAL at 17:15

## 2024-02-29 RX ADMIN — SODIUM CHLORIDE: 9 INJECTION, SOLUTION INTRAVENOUS at 05:33

## 2024-02-29 RX ADMIN — ACETAMINOPHEN 650 MG: 325 TABLET, FILM COATED ORAL at 21:51

## 2024-02-29 RX ADMIN — LISINOPRIL 5 MG: 5 TABLET ORAL at 13:17

## 2024-02-29 RX ADMIN — DOCUSATE SODIUM 50 MG AND SENNOSIDES 8.6 MG 1 TABLET: 8.6; 5 TABLET, FILM COATED ORAL at 21:51

## 2024-02-29 RX ADMIN — ENOXAPARIN SODIUM 40 MG: 100 INJECTION SUBCUTANEOUS at 17:16

## 2024-02-29 RX ADMIN — OXYCODONE 5 MG: 5 TABLET ORAL at 21:51

## 2024-02-29 RX ADMIN — SODIUM CHLORIDE: 9 INJECTION, SOLUTION INTRAVENOUS at 17:17

## 2024-02-29 RX ADMIN — PROPOFOL 60 MCG/KG/MIN: 10 INJECTION, EMULSION INTRAVENOUS at 20:48

## 2024-02-29 RX ADMIN — LEVOTHYROXINE SODIUM 100 MCG: 0.1 TABLET ORAL at 05:55

## 2024-02-29 RX ADMIN — INSULIN HUMAN 7 UNITS: 100 INJECTION, SOLUTION PARENTERAL at 09:03

## 2024-02-29 RX ADMIN — PROPOFOL 60 MCG/KG/MIN: 10 INJECTION, EMULSION INTRAVENOUS at 13:28

## 2024-02-29 RX ADMIN — PROPOFOL 70 MCG/KG/MIN: 10 INJECTION, EMULSION INTRAVENOUS at 07:07

## 2024-02-29 RX ADMIN — POLYETHYLENE GLYCOL 3350 1 PACKET: 17 POWDER, FOR SOLUTION ORAL at 17:15

## 2024-02-29 RX ADMIN — INSULIN HUMAN 4 UNITS: 100 INJECTION, SOLUTION PARENTERAL at 12:26

## 2024-02-29 RX ADMIN — PROPOFOL 60 MCG/KG/MIN: 10 INJECTION, EMULSION INTRAVENOUS at 09:41

## 2024-02-29 RX ADMIN — OXYCODONE 5 MG: 5 TABLET ORAL at 13:17

## 2024-02-29 ASSESSMENT — PAIN DESCRIPTION - PAIN TYPE
TYPE: ACUTE PAIN

## 2024-02-29 NOTE — PROGRESS NOTES
Discussed with partners and Dr. Dale.  The patient is stable from an anesthesia perspective.  Will plan on tracheostomy, direct laryngoscopy and possible fixation thyroid cartilage fracture tomorrow afternoon or Saturday.

## 2024-02-29 NOTE — PROGRESS NOTES
Cardiology Follow Up Progress Note    Date of Service  2/29/2024    Attending Physician  Taz Ward M.D.    Chief Complaint     Cardiology consulted for evaluation of elevated troponin ~350    HPI  Shaheen Peguero is a 74 y.o. male admitted 2/27/2024 with syncopal episode with subsequent trauma (tracheal/cricoid cartilage fracture with pneumothoraces, unclear if related to intubation outside facility).    PMH: PCI mLAD 6/7/2021 ([facility]) ischemic cardiomyopathy recovered as of 2022, type 2 diabetes, dyslipidemia, PAF on Xarelto, thyroid disorder, provoked DVT      Interim Events  No overnight cardiac events  Telemetry-SR no acute ischemic changes.  No evidence of atrial or ventricular arrhythmia.  Remains intubated, sedated    Troponin, trending down    Review of Systems  Review of Systems   Unable to perform ROS: Intubated       Vital signs in last 24 hours  Pulse:  [60-95] 64  Resp:  [12-25] 18  BP: (110-181)/(52-86) 139/65  SpO2:  [88 %-99 %] 98 %    Physical Exam  Physical Exam  Constitutional:       Comments: Intubated, sedated   Neck:      Comments: C-collar  Cardiovascular:      Rate and Rhythm: Normal rate and regular rhythm.      Pulses: Normal pulses.   Pulmonary:      Comments: intubated  Abdominal:      Comments: NG tube   Genitourinary:     Comments: Brooke  Skin:     General: Skin is warm.         Lab Review  Lab Results   Component Value Date/Time    WBC 9.6 02/29/2024 05:35 AM    RBC 3.96 (L) 02/29/2024 05:35 AM    HEMOGLOBIN 12.4 (L) 02/29/2024 05:35 AM    HEMATOCRIT 36.9 (L) 02/29/2024 05:35 AM    MCV 93.2 02/29/2024 05:35 AM    MCH 31.3 02/29/2024 05:35 AM    MCHC 33.6 02/29/2024 05:35 AM    MPV 9.6 02/29/2024 05:35 AM      Lab Results   Component Value Date/Time    SODIUM 136 02/29/2024 05:35 AM    POTASSIUM 4.2 02/29/2024 05:35 AM    CHLORIDE 104 02/29/2024 05:35 AM    CO2 21 02/29/2024 05:35 AM    GLUCOSE 258 (H) 02/29/2024 05:35 AM    BUN 16 02/29/2024 05:35 AM     CREATININE 0.71 02/29/2024 05:35 AM      Lab Results   Component Value Date/Time    ASTSGOT 19 02/29/2024 05:35 AM    ALTSGPT 21 02/29/2024 05:35 AM     Lab Results   Component Value Date/Time    CHOLSTRLTOT 103 02/28/2024 05:20 AM    LDL 47 02/28/2024 05:20 AM    HDL 32 (A) 02/28/2024 05:20 AM    TRIGLYCERIDE 153 (H) 02/29/2024 05:35 AM    TROPONINT 86 (H) 02/28/2024 11:40 AM       Recent Labs     02/27/24  1242   NTPROBNP 368*       Cardiac Imaging and Procedures Review  EKG: SR, PVCs, left anterior fascicular block, no ischemic changes.      Echocardiogram:    2/28/24  The left ventricle may be mildly dilated with grossly normal wall   thickness.  Grossly normal left ventricular systolic function.  Left ventricular systolic function appears low normal to mildly   reduced.  The left ventricular ejection fraction is roughly estimated to be 45-  50%, however, the accuracy of this estimate is limited.  There is questionable hypokinesis of the septum.  Unable to accurately estimate diastolic function.  The right ventricle is not well-visualized, but appears grossly normal   in size and systolic function.  Significant biatrial dilation.  Grossly normal valvular function.  Normal pericardium without effusion.          Cardiac Catheterization:    [facility]  6/7/2021  Angiography left coronary artery reveals that the left main segment has no significant disease.  The circumflex that is Marginal Branches Have No Significant Disease.  In the left anterior descending artery, there is a proximal 30% stenosis, there is a mid eccentric 90% stenosis.  Diagonal branches have no significant disease.  RCA is dominant, no significant disease.  Final result: Successful stenting of the mid LAD with 3.0 x 15 mm Biotronik drug-eluting stents.    Imaging  Chest X-Ray:     No definite pneumomediastinum. No pneumothorax.     2. Neck soft tissue emphysema is redemonstrated.           Stress Test:   5/20/2021  [facility]  Mild  reversible perfusion in the inferior wall and inferior aspect of the lateral wall suggesting ischemia.  Septum and apex are akinetic.  Anterior, lateral and inferior walls are hypokinetic.      Assessment/Plan    # Syncope and collapse.  # EKG nonrevealing, no ischemic changes.  # Elevated troponin ~300, trending down.  # Echocardiogram, technically limited study LVEF 45 to 50%.  Questionable hypokinesis of the septum.  # PCI mid LAD 6/7/2021 ([facility]  # PAF, on Xarelto, currently on hold pending tracheostomy.  # A1c 8, SSI inpatient  # Respiratory failure with trauma, intubated at [place], remains intubated.  # Thyroid cartilage fracture.  # Pneumothorax      -Continue telemetry monitoring.  -Aspirin 81 mg daily if okay with surgery general.  -Continue statin, BB.  -IV heparin gtt if okay with surgery general.  -Plan for ischemic workup when clinically stable, likely prior to discharge.      Cardiology will follow along    I personally spent a total of 18 minutes which includes face-to-face time and non-face-to-face time spent on preparing to see the patient, reviewing hospital notes and tests, obtaining history from the patient, performing a medically appropriate exam, counseling and educating the patient, ordering medications/tests/procedures/referrals as clinically indicated, and documenting information in the electronic medical record.         Thank you for allowing me to participate in the care of this patient.  I will continue to follow this patient    Please contact me with any questions.    KENNEDY Cunha.   Cardiologist, [facility]  [phone]

## 2024-02-29 NOTE — CARE PLAN
Problem: Ventilation  Goal: Ability to achieve and maintain unassisted ventilation or tolerate decreased levels of ventilator support  Description: Target End Date:  4 days     Document on Vent flowsheet    1.  Support and monitor invasive and noninvasive mechanical ventilation  2.  Monitor ventilator weaning response  3.  Perform ventilator associated pneumonia prevention interventions  4.  Manage ventilation therapy by monitoring diagnostic test results  Outcome: Progressing         Ventilator Daily Summary    Vent Day # 3  Airway:  8.0 @ 27    Ventilator settings:  asv: 100/8/30  Weaning trials:  none  Respiratory Procedures:  none    Plan: Continue current ventilator settings and wean mechanical ventilation as tolerated per physician orders.

## 2024-02-29 NOTE — CARE PLAN
The patient is Watcher - Medium risk of patient condition declining or worsening    Shift Goals  Clinical Goals: Hemo  Problem: Safety - Medical Restraint  Goal: Remains free of injury from restraints (Restraint for Interference with Medical Device)  Outcome: Not Progressing  Goal: Free from restraint(s) (Restraint for Interference with Medical Device)  Outcome: Not Progressing     Problem: Pain - Standard  Goal: Alleviation of pain or a reduction in pain to the patient’s comfort goal  Outcome: Progressing   dynamic stability  Patient Goals: VIANNEY  Family Goals: no family present    Progress made toward(s) clinical / shift goals:  vital signs stable    Patient is not progressing towards the following goals: extubation. Plan of care established      Problem: Safety - Medical Restraint  Goal: Remains free of injury from restraints (Restraint for Interference with Medical Device)  Outcome: Not Progressing  Goal: Free from restraint(s) (Restraint for Interference with Medical Device)  Outcome: Not Progressing

## 2024-02-29 NOTE — CARE PLAN
Problem: Safety - Medical Restraint  Goal: Remains free of injury from restraints (Restraint for Interference with Medical Device)  2/29/2024 0014 by Shawanda Lorenzana R.N.  Outcome: Progressing  2/29/2024 0014 by Shawanda Lorenzana R.N.  Outcome: Progressing  Goal: Free from restraint(s) (Restraint for Interference with Medical Device)  2/29/2024 0014 by Shawanda Lorenzana RReneeN.  Outcome: Not Progressing  2/29/2024 0014 by Shawanda Lorenzana R.N.  Outcome: Not Progressing     Problem: Pain - Standard  Goal: Alleviation of pain or a reduction in pain to the patient’s comfort goal  Outcome: Progressing     Problem: Hemodynamics  Goal: Patient's hemodynamics, fluid balance and neurologic status will be stable or improve  Outcome: Progressing   The patient is Watcher - Medium risk of patient condition declining or worsening    Shift Goals  Clinical Goals: Hemodynamic stability  Patient Goals: VIANNEY  Family Goals: no family present    Progress made toward(s) clinical / shift goals:  Pt restraints assessed every two hours and prn. Pt at risk of removing life saving devices. Pt given pain medication per scale. Pt resting comfortably after pain medication.     Patient is not progressing towards the following goals:      Problem: Safety - Medical Restraint  Goal: Free from restraint(s) (Restraint for Interference with Medical Device)  2/29/2024 0014 by Shawanda Lorenzana, R.N.  Outcome: Not Progressing  2/29/2024 0014 by Shawanda Lorenzana R.N.  Outcome: Not Progressing

## 2024-03-01 ENCOUNTER — ANESTHESIA (OUTPATIENT)
Dept: SURGERY | Facility: MEDICAL CENTER | Age: 74
DRG: 004 | End: 2024-03-01
Payer: COMMERCIAL

## 2024-03-01 ENCOUNTER — APPOINTMENT (OUTPATIENT)
Dept: RADIOLOGY | Facility: MEDICAL CENTER | Age: 74
DRG: 004 | End: 2024-03-01
Attending: OTOLARYNGOLOGY
Payer: COMMERCIAL

## 2024-03-01 ENCOUNTER — ANESTHESIA EVENT (OUTPATIENT)
Dept: SURGERY | Facility: MEDICAL CENTER | Age: 74
DRG: 004 | End: 2024-03-01
Payer: COMMERCIAL

## 2024-03-01 ENCOUNTER — APPOINTMENT (OUTPATIENT)
Dept: RADIOLOGY | Facility: MEDICAL CENTER | Age: 74
DRG: 004 | End: 2024-03-01
Attending: SURGERY
Payer: COMMERCIAL

## 2024-03-01 LAB
ALBUMIN SERPL BCP-MCNC: 3.1 G/DL (ref 3.2–4.9)
ALBUMIN/GLOB SERPL: 1.2 G/DL
ALP SERPL-CCNC: 60 U/L (ref 30–99)
ALT SERPL-CCNC: 24 U/L (ref 2–50)
ANION GAP SERPL CALC-SCNC: 11 MMOL/L (ref 7–16)
ARTERIAL PATENCY WRIST A: NORMAL
AST SERPL-CCNC: 27 U/L (ref 12–45)
BASE EXCESS BLDA CALC-SCNC: -2 MMOL/L (ref -4–3)
BASOPHILS # BLD AUTO: 0.4 % (ref 0–1.8)
BASOPHILS # BLD: 0.03 K/UL (ref 0–0.12)
BILIRUB SERPL-MCNC: 0.4 MG/DL (ref 0.1–1.5)
BODY TEMPERATURE: NORMAL DEGREES
BUN SERPL-MCNC: 15 MG/DL (ref 8–22)
CALCIUM ALBUM COR SERPL-MCNC: 8.6 MG/DL (ref 8.5–10.5)
CALCIUM SERPL-MCNC: 7.9 MG/DL (ref 8.5–10.5)
CHLORIDE SERPL-SCNC: 107 MMOL/L (ref 96–112)
CO2 BLDA-SCNC: 23 MMOL/L (ref 20–33)
CO2 SERPL-SCNC: 20 MMOL/L (ref 20–33)
CREAT SERPL-MCNC: 0.52 MG/DL (ref 0.5–1.4)
DELSYS IDSYS: NORMAL
EOSINOPHIL # BLD AUTO: 0.11 K/UL (ref 0–0.51)
EOSINOPHIL NFR BLD: 1.5 % (ref 0–6.9)
ERYTHROCYTE [DISTWIDTH] IN BLOOD BY AUTOMATED COUNT: 45.5 FL (ref 35.9–50)
GFR SERPLBLD CREATININE-BSD FMLA CKD-EPI: 105 ML/MIN/1.73 M 2
GLOBULIN SER CALC-MCNC: 2.6 G/DL (ref 1.9–3.5)
GLUCOSE BLD STRIP.AUTO-MCNC: 235 MG/DL (ref 65–99)
GLUCOSE BLD STRIP.AUTO-MCNC: 241 MG/DL (ref 65–99)
GLUCOSE BLD STRIP.AUTO-MCNC: 246 MG/DL (ref 65–99)
GLUCOSE BLD STRIP.AUTO-MCNC: 280 MG/DL (ref 65–99)
GLUCOSE SERPL-MCNC: 302 MG/DL (ref 65–99)
HCO3 BLDA-SCNC: 21.6 MMOL/L (ref 17–25)
HCT VFR BLD AUTO: 33.5 % (ref 42–52)
HGB BLD-MCNC: 11.5 G/DL (ref 14–18)
HOROWITZ INDEX BLDA+IHG-RTO: 253 MM[HG]
IMM GRANULOCYTES # BLD AUTO: 0.03 K/UL (ref 0–0.11)
IMM GRANULOCYTES NFR BLD AUTO: 0.4 % (ref 0–0.9)
LACTATE BLD-SCNC: 0.5 MMOL/L (ref 0.5–2)
LYMPHOCYTES # BLD AUTO: 0.75 K/UL (ref 1–4.8)
LYMPHOCYTES NFR BLD: 10.1 % (ref 22–41)
MCH RBC QN AUTO: 31.6 PG (ref 27–33)
MCHC RBC AUTO-ENTMCNC: 34.3 G/DL (ref 32.3–36.5)
MCV RBC AUTO: 92 FL (ref 81.4–97.8)
MODE IMODE: NORMAL
MONOCYTES # BLD AUTO: 0.8 K/UL (ref 0–0.85)
MONOCYTES NFR BLD AUTO: 10.8 % (ref 0–13.4)
NEUTROPHILS # BLD AUTO: 5.67 K/UL (ref 1.82–7.42)
NEUTROPHILS NFR BLD: 76.8 % (ref 44–72)
NRBC # BLD AUTO: 0 K/UL
NRBC BLD-RTO: 0 /100 WBC (ref 0–0.2)
O2/TOTAL GAS SETTING VFR VENT: 30 %
PCO2 BLDA: 33.2 MMHG (ref 26–37)
PCO2 TEMP ADJ BLDA: 35.1 MMHG (ref 26–37)
PEEP END EXPIRATORY PRESSURE IPEEP: 8 CMH20
PERCENT MINUTE VOLUME IPMV: 100
PH BLDA: 7.42 [PH] (ref 7.4–7.5)
PH TEMP ADJ BLDA: 7.4 [PH] (ref 7.4–7.5)
PLATELET # BLD AUTO: 156 K/UL (ref 164–446)
PMV BLD AUTO: 9.6 FL (ref 9–12.9)
PO2 BLDA: 76 MMHG (ref 64–87)
PO2 TEMP ADJ BLDA: 82 MMHG (ref 64–87)
POTASSIUM SERPL-SCNC: 3.9 MMOL/L (ref 3.6–5.5)
PROT SERPL-MCNC: 5.7 G/DL (ref 6–8.2)
RBC # BLD AUTO: 3.64 M/UL (ref 4.7–6.1)
SAO2 % BLDA: 95 % (ref 93–99)
SODIUM SERPL-SCNC: 138 MMOL/L (ref 135–145)
SPECIMEN DRAWN FROM PATIENT: NORMAL
WBC # BLD AUTO: 7.4 K/UL (ref 4.8–10.8)

## 2024-03-01 PROCEDURE — 36600 WITHDRAWAL OF ARTERIAL BLOOD: CPT

## 2024-03-01 PROCEDURE — 83605 ASSAY OF LACTIC ACID: CPT

## 2024-03-01 PROCEDURE — 700105 HCHG RX REV CODE 258: Performed by: NURSE PRACTITIONER

## 2024-03-01 PROCEDURE — 0B110F4 BYPASS TRACHEA TO CUTANEOUS WITH TRACHEOSTOMY DEVICE, OPEN APPROACH: ICD-10-PCS | Performed by: OTOLARYNGOLOGY

## 2024-03-01 PROCEDURE — 770022 HCHG ROOM/CARE - ICU (200)

## 2024-03-01 PROCEDURE — 160048 HCHG OR STATISTICAL LEVEL 1-5: Performed by: OTOLARYNGOLOGY

## 2024-03-01 PROCEDURE — 160009 HCHG ANES TIME/MIN: Performed by: OTOLARYNGOLOGY

## 2024-03-01 PROCEDURE — 82803 BLOOD GASES ANY COMBINATION: CPT

## 2024-03-01 PROCEDURE — 85025 COMPLETE CBC W/AUTO DIFF WBC: CPT

## 2024-03-01 PROCEDURE — 700111 HCHG RX REV CODE 636 W/ 250 OVERRIDE (IP): Mod: JZ | Performed by: ANESTHESIOLOGY

## 2024-03-01 PROCEDURE — 700111 HCHG RX REV CODE 636 W/ 250 OVERRIDE (IP): Performed by: NURSE PRACTITIONER

## 2024-03-01 PROCEDURE — 71045 X-RAY EXAM CHEST 1 VIEW: CPT

## 2024-03-01 PROCEDURE — 700102 HCHG RX REV CODE 250 W/ 637 OVERRIDE(OP)

## 2024-03-01 PROCEDURE — 94799 UNLISTED PULMONARY SVC/PX: CPT

## 2024-03-01 PROCEDURE — A9270 NON-COVERED ITEM OR SERVICE: HCPCS | Performed by: SURGERY

## 2024-03-01 PROCEDURE — 93880 EXTRACRANIAL BILAT STUDY: CPT

## 2024-03-01 PROCEDURE — 0CJS8ZZ INSPECTION OF LARYNX, VIA NATURAL OR ARTIFICIAL OPENING ENDOSCOPIC: ICD-10-PCS | Performed by: OTOLARYNGOLOGY

## 2024-03-01 PROCEDURE — 160039 HCHG SURGERY MINUTES - EA ADDL 1 MIN LEVEL 3: Performed by: OTOLARYNGOLOGY

## 2024-03-01 PROCEDURE — A9270 NON-COVERED ITEM OR SERVICE: HCPCS | Performed by: INTERNAL MEDICINE

## 2024-03-01 PROCEDURE — 99232 SBSQ HOSP IP/OBS MODERATE 35: CPT | Performed by: STUDENT IN AN ORGANIZED HEALTH CARE EDUCATION/TRAINING PROGRAM

## 2024-03-01 PROCEDURE — 5A1955Z RESPIRATORY VENTILATION, GREATER THAN 96 CONSECUTIVE HOURS: ICD-10-PCS | Performed by: OTOLARYNGOLOGY

## 2024-03-01 PROCEDURE — 700102 HCHG RX REV CODE 250 W/ 637 OVERRIDE(OP): Performed by: NURSE PRACTITIONER

## 2024-03-01 PROCEDURE — 160028 HCHG SURGERY MINUTES - 1ST 30 MINS LEVEL 3: Performed by: OTOLARYNGOLOGY

## 2024-03-01 PROCEDURE — 93880 EXTRACRANIAL BILAT STUDY: CPT | Mod: 26 | Performed by: INTERNAL MEDICINE

## 2024-03-01 PROCEDURE — 700101 HCHG RX REV CODE 250: Performed by: OTOLARYNGOLOGY

## 2024-03-01 PROCEDURE — A9270 NON-COVERED ITEM OR SERVICE: HCPCS | Performed by: NURSE PRACTITIONER

## 2024-03-01 PROCEDURE — 80053 COMPREHEN METABOLIC PANEL: CPT

## 2024-03-01 PROCEDURE — 700102 HCHG RX REV CODE 250 W/ 637 OVERRIDE(OP): Performed by: SURGERY

## 2024-03-01 PROCEDURE — 700102 HCHG RX REV CODE 250 W/ 637 OVERRIDE(OP): Performed by: STUDENT IN AN ORGANIZED HEALTH CARE EDUCATION/TRAINING PROGRAM

## 2024-03-01 PROCEDURE — 82962 GLUCOSE BLOOD TEST: CPT

## 2024-03-01 PROCEDURE — A9270 NON-COVERED ITEM OR SERVICE: HCPCS

## 2024-03-01 PROCEDURE — 700101 HCHG RX REV CODE 250: Performed by: ANESTHESIOLOGY

## 2024-03-01 PROCEDURE — 94003 VENT MGMT INPAT SUBQ DAY: CPT

## 2024-03-01 PROCEDURE — 700105 HCHG RX REV CODE 258: Performed by: ANESTHESIOLOGY

## 2024-03-01 PROCEDURE — 700111 HCHG RX REV CODE 636 W/ 250 OVERRIDE (IP): Mod: JZ

## 2024-03-01 PROCEDURE — 700102 HCHG RX REV CODE 250 W/ 637 OVERRIDE(OP): Performed by: INTERNAL MEDICINE

## 2024-03-01 RX ORDER — SODIUM CHLORIDE, SODIUM LACTATE, POTASSIUM CHLORIDE, CALCIUM CHLORIDE 600; 310; 30; 20 MG/100ML; MG/100ML; MG/100ML; MG/100ML
INJECTION, SOLUTION INTRAVENOUS CONTINUOUS
Status: DISCONTINUED | OUTPATIENT
Start: 2024-03-01 | End: 2024-03-02

## 2024-03-01 RX ORDER — SODIUM CHLORIDE, SODIUM LACTATE, POTASSIUM CHLORIDE, CALCIUM CHLORIDE 600; 310; 30; 20 MG/100ML; MG/100ML; MG/100ML; MG/100ML
INJECTION, SOLUTION INTRAVENOUS
Status: DISCONTINUED | OUTPATIENT
Start: 2024-03-01 | End: 2024-03-01 | Stop reason: SURG

## 2024-03-01 RX ORDER — DIPHENHYDRAMINE HYDROCHLORIDE 50 MG/ML
12.5 INJECTION INTRAMUSCULAR; INTRAVENOUS
Status: DISCONTINUED | OUTPATIENT
Start: 2024-03-01 | End: 2024-03-02

## 2024-03-01 RX ORDER — HYDROMORPHONE HYDROCHLORIDE 1 MG/ML
0.2 INJECTION, SOLUTION INTRAMUSCULAR; INTRAVENOUS; SUBCUTANEOUS
Status: DISCONTINUED | OUTPATIENT
Start: 2024-03-01 | End: 2024-03-02

## 2024-03-01 RX ORDER — MIDAZOLAM HYDROCHLORIDE 1 MG/ML
INJECTION INTRAMUSCULAR; INTRAVENOUS PRN
Status: DISCONTINUED | OUTPATIENT
Start: 2024-03-01 | End: 2024-03-01 | Stop reason: SURG

## 2024-03-01 RX ORDER — HYDROMORPHONE HYDROCHLORIDE 1 MG/ML
0.4 INJECTION, SOLUTION INTRAMUSCULAR; INTRAVENOUS; SUBCUTANEOUS
Status: DISCONTINUED | OUTPATIENT
Start: 2024-03-01 | End: 2024-03-02

## 2024-03-01 RX ORDER — HYDROMORPHONE HYDROCHLORIDE 1 MG/ML
0.1 INJECTION, SOLUTION INTRAMUSCULAR; INTRAVENOUS; SUBCUTANEOUS
Status: DISCONTINUED | OUTPATIENT
Start: 2024-03-01 | End: 2024-03-02

## 2024-03-01 RX ORDER — ONDANSETRON 2 MG/ML
4 INJECTION INTRAMUSCULAR; INTRAVENOUS
Status: DISCONTINUED | OUTPATIENT
Start: 2024-03-01 | End: 2024-03-02

## 2024-03-01 RX ORDER — CEFAZOLIN SODIUM 1 G/3ML
INJECTION, POWDER, FOR SOLUTION INTRAMUSCULAR; INTRAVENOUS PRN
Status: DISCONTINUED | OUTPATIENT
Start: 2024-03-01 | End: 2024-03-01 | Stop reason: SURG

## 2024-03-01 RX ORDER — HALOPERIDOL 5 MG/ML
1 INJECTION INTRAMUSCULAR
Status: DISCONTINUED | OUTPATIENT
Start: 2024-03-01 | End: 2024-03-02

## 2024-03-01 RX ADMIN — PROPOFOL 60 MCG/KG/MIN: 10 INJECTION, EMULSION INTRAVENOUS at 00:00

## 2024-03-01 RX ADMIN — FENTANYL CITRATE 50 MCG: 50 INJECTION, SOLUTION INTRAMUSCULAR; INTRAVENOUS at 15:30

## 2024-03-01 RX ADMIN — ROSUVASTATIN CALCIUM 20 MG: 20 TABLET, FILM COATED ORAL at 06:04

## 2024-03-01 RX ADMIN — METOPROLOL TARTRATE 25 MG: 25 TABLET, FILM COATED ORAL at 06:03

## 2024-03-01 RX ADMIN — MIDAZOLAM HYDROCHLORIDE 2 MG: 1 INJECTION, SOLUTION INTRAMUSCULAR; INTRAVENOUS at 15:30

## 2024-03-01 RX ADMIN — INSULIN HUMAN 4 UNITS: 100 INJECTION, SOLUTION PARENTERAL at 06:19

## 2024-03-01 RX ADMIN — PROPOFOL 60 MCG/KG/MIN: 10 INJECTION, EMULSION INTRAVENOUS at 20:35

## 2024-03-01 RX ADMIN — SODIUM CHLORIDE: 9 INJECTION, SOLUTION INTRAVENOUS at 21:22

## 2024-03-01 RX ADMIN — ENOXAPARIN SODIUM 40 MG: 100 INJECTION SUBCUTANEOUS at 17:46

## 2024-03-01 RX ADMIN — SODIUM CHLORIDE: 9 INJECTION, SOLUTION INTRAVENOUS at 06:22

## 2024-03-01 RX ADMIN — FENTANYL CITRATE 50 MCG: 50 INJECTION, SOLUTION INTRAMUSCULAR; INTRAVENOUS at 14:35

## 2024-03-01 RX ADMIN — INSULIN HUMAN 7 UNITS: 100 INJECTION, SOLUTION PARENTERAL at 00:39

## 2024-03-01 RX ADMIN — CEFAZOLIN 2 G: 1 INJECTION, POWDER, FOR SOLUTION INTRAMUSCULAR; INTRAVENOUS at 14:44

## 2024-03-01 RX ADMIN — LEVOTHYROXINE SODIUM 100 MCG: 0.1 TABLET ORAL at 06:03

## 2024-03-01 RX ADMIN — PROPOFOL 60 MCG/KG/MIN: 10 INJECTION, EMULSION INTRAVENOUS at 09:44

## 2024-03-01 RX ADMIN — DOCUSATE SODIUM 100 MG: 50 LIQUID ORAL at 17:46

## 2024-03-01 RX ADMIN — GABAPENTIN 300 MG: 300 CAPSULE ORAL at 06:03

## 2024-03-01 RX ADMIN — INSULIN HUMAN 4 UNITS: 100 INJECTION, SOLUTION PARENTERAL at 17:35

## 2024-03-01 RX ADMIN — POLYETHYLENE GLYCOL 3350 1 PACKET: 17 POWDER, FOR SOLUTION ORAL at 17:46

## 2024-03-01 RX ADMIN — PROPOFOL 150 MG: 10 INJECTION, EMULSION INTRAVENOUS at 14:33

## 2024-03-01 RX ADMIN — INSULIN HUMAN 4 UNITS: 100 INJECTION, SOLUTION PARENTERAL at 13:10

## 2024-03-01 RX ADMIN — FAMOTIDINE 20 MG: 20 TABLET, FILM COATED ORAL at 17:46

## 2024-03-01 RX ADMIN — DOCUSATE SODIUM 100 MG: 50 LIQUID ORAL at 06:04

## 2024-03-01 RX ADMIN — ROCURONIUM BROMIDE 50 MG: 10 INJECTION, SOLUTION INTRAVENOUS at 14:34

## 2024-03-01 RX ADMIN — DOCUSATE SODIUM 50 MG AND SENNOSIDES 8.6 MG 1 TABLET: 8.6; 5 TABLET, FILM COATED ORAL at 20:55

## 2024-03-01 RX ADMIN — SODIUM CHLORIDE, POTASSIUM CHLORIDE, SODIUM LACTATE AND CALCIUM CHLORIDE: 600; 310; 30; 20 INJECTION, SOLUTION INTRAVENOUS at 14:33

## 2024-03-01 RX ADMIN — ACETAMINOPHEN 650 MG: 325 TABLET, FILM COATED ORAL at 19:16

## 2024-03-01 RX ADMIN — FENTANYL CITRATE 100 MCG: 50 INJECTION, SOLUTION INTRAMUSCULAR; INTRAVENOUS at 14:52

## 2024-03-01 RX ADMIN — ENOXAPARIN SODIUM 40 MG: 100 INJECTION SUBCUTANEOUS at 06:04

## 2024-03-01 RX ADMIN — FAMOTIDINE 20 MG: 20 TABLET, FILM COATED ORAL at 06:03

## 2024-03-01 RX ADMIN — GABAPENTIN 300 MG: 300 CAPSULE ORAL at 17:46

## 2024-03-01 RX ADMIN — METOPROLOL TARTRATE 25 MG: 25 TABLET, FILM COATED ORAL at 17:46

## 2024-03-01 RX ADMIN — PROPOFOL 60 MCG/KG/MIN: 10 INJECTION, EMULSION INTRAVENOUS at 06:04

## 2024-03-01 RX ADMIN — PROPOFOL 60 MCG/KG/MIN: 10 INJECTION, EMULSION INTRAVENOUS at 17:46

## 2024-03-01 RX ADMIN — ROCURONIUM BROMIDE 50 MG: 10 INJECTION, SOLUTION INTRAVENOUS at 15:20

## 2024-03-01 RX ADMIN — LISINOPRIL 5 MG: 5 TABLET ORAL at 06:03

## 2024-03-01 RX ADMIN — INSULIN GLARGINE-YFGN 15 UNITS: 100 INJECTION, SOLUTION SUBCUTANEOUS at 17:37

## 2024-03-01 RX ADMIN — FENTANYL CITRATE 50 MCG: 50 INJECTION, SOLUTION INTRAMUSCULAR; INTRAVENOUS at 14:58

## 2024-03-01 RX ADMIN — PROPOFOL 60 MCG/KG/MIN: 10 INJECTION, EMULSION INTRAVENOUS at 02:50

## 2024-03-01 ASSESSMENT — PAIN DESCRIPTION - PAIN TYPE
TYPE: ACUTE PAIN

## 2024-03-01 ASSESSMENT — PAIN SCALES - GENERAL: PAIN_LEVEL: 0

## 2024-03-01 NOTE — OR NURSING
Pt arrives in preop. Consent for surgery and anesthesia obtained over the phone with Cici, wife. All questions answered. Handoff completed with two Rns. Pt vented and taken back immediately to OR.

## 2024-03-01 NOTE — PROGRESS NOTES
Patient taken to pre-op [facility] RT, RN and transport. Report given to anesthesia. MD called wife for consent and to discuss procedure. Patient in stable condition with propofol running.

## 2024-03-01 NOTE — CARE PLAN
Problem: Ventilation  Goal: Ability to achieve and maintain unassisted ventilation or tolerate decreased levels of ventilator support  Description: Target End Date:  4 days     Document on Vent flowsheet    1.  Support and monitor invasive and noninvasive mechanical ventilation  2.  Monitor ventilator weaning response  3.  Perform ventilator associated pneumonia prevention interventions  4.  Manage ventilation therapy by monitoring diagnostic test results  Outcome: Progressing     Ventilator Daily Summary    Vent Day # 4  Airway: 8.0 @ 27    Ventilator settings: /8/30%     Plan: Continue current ventilator settings and wean mechanical ventilation as tolerated per physician orders.

## 2024-03-01 NOTE — ANESTHESIA TIME REPORT
Anesthesia Start and Stop Event Times       Date Time Event    3/1/2024 1316 Ready for Procedure     1433 Anesthesia Start     1546 Anesthesia Stop          Responsible Staff  03/01/24      Name Role Begin End    Inderjit Cade M.D. Anesth 1433 1544          Overtime Reason:  overtime    Comments:                                                           History  Chief Complaint   Patient presents with    Back Pain     I twisted and hurt my back at work yesterday-pain in middle of back     This is a 48-year-old male patient who states he was lifting work yesterday and developed pain in his midthoracic  Is reproducible is paramedian she was spinal after left-sided shoulder blades  It does not hurt to take deep breath  It hurts to move with pain is not so bad he has had before this is not better the 1st time  He has taken nothing for the pain  With not radiate  No fever no chills no headache no blurred vision double vision no cough congestion sore throat nausea vomiting diarrhea abdominal pain no pleuritic pain  No chest pain or shortness of breath  No urinary symptoms  Made worse with movement made better when he holds still  Prior to Admission Medications   Prescriptions Last Dose Informant Patient Reported? Taking?   omeprazole (PriLOSEC) 20 mg delayed release capsule   No No   Sig: Take 1 capsule (20 mg total) by mouth daily   ondansetron (ZOFRAN-ODT) 4 mg disintegrating tablet   No No   Sig: Take 1 tablet (4 mg total) by mouth every 6 (six) hours as needed for nausea or vomiting   sucralfate (CARAFATE) 1 g tablet   No No   Sig: Take 1 tablet (1 g total) by mouth 4 (four) times a day      Facility-Administered Medications: None       History reviewed  No pertinent past medical history  Past Surgical History:   Procedure Laterality Date    FOOT SURGERY         History reviewed  No pertinent family history  I have reviewed and agree with the history as documented  Social History   Substance Use Topics    Smoking status: Current Every Day Smoker     Types: E-Cigarettes    Smokeless tobacco: Never Used    Alcohol use No        Review of Systems   All other systems reviewed and are negative  Physical Exam  Physical Exam   Constitutional: He appears well-developed and well-nourished  HENT:   Head: Normocephalic and atraumatic  Right Ear: External ear normal    Left Ear: External ear normal    Nose: Nose normal    Mouth/Throat: Oropharynx is clear and moist    Eyes: Pupils are equal, round, and reactive to light  Conjunctivae are normal    Neck: Normal range of motion  Neck supple  Cardiovascular: Normal rate and regular rhythm  Pulmonary/Chest: Effort normal and breath sounds normal    Abdominal: Soft  Bowel sounds are normal  There is no tenderness  Musculoskeletal: Normal range of motion  Back:    Neurological: He is alert  Skin: Skin is warm  Psychiatric: He has a normal mood and affect  His behavior is normal    Nursing note and vitals reviewed  Vital Signs  ED Triage Vitals [12/17/18 0927]   Temperature Pulse Respirations Blood Pressure SpO2   (!) 96 5 °F (35 8 °C) 80 16 119/70 99 %      Temp Source Heart Rate Source Patient Position - Orthostatic VS BP Location FiO2 (%)   Tymp Core Monitor Sitting Left arm --      Pain Score       Worst Possible Pain           Vitals:    12/17/18 0927   BP: 119/70   Pulse: 80   Patient Position - Orthostatic VS: Sitting       Visual Acuity      ED Medications  Medications - No data to display    Diagnostic Studies  Results Reviewed     None                 No orders to display              Procedures  Procedures       Phone Contacts  ED Phone Contact    ED Course                               MDM  CritCare Time    Disposition  Final diagnoses:   Muscle strain     Time reflects when diagnosis was documented in both MDM as applicable and the Disposition within this note     Time User Action Codes Description Comment    12/17/2018 10:07 AM Armando Lopez  [address] Muscle strain       ED Disposition     ED Disposition Condition Comment    Discharge  Odalis Mancini discharge to home/self care      Condition at discharge: Good        Follow-up Information     Follow up With Specialties Details Why Contact [facility]    [address] [facility] Schedule an appointment as soon as possible for a visit  [address]  [phone]            Patient's Medications   Discharge Prescriptions    BACLOFEN 10 MG TABLET    Take 1 tablet (10 mg total) by mouth 3 (three) times a day for 4 days       Start Date: 12/17/2018End Date: 12/21/2018       Order Dose: 10 mg       Quantity: 12 tablet    Refills: 0    NAPROXEN (EC NAPROSYN) 500 MG EC TABLET    Take 1 tablet (500 mg total) by mouth 2 (two) times a day with meals       Start Date: 12/17/2018End Date: 12/17/2019       Order Dose: 500 mg       Quantity: 10 tablet    Refills: 0     No discharge procedures on file      ED Provider  Electronically Signed by           Ronald Hawthorne PA-C  12/17/18 1015

## 2024-03-01 NOTE — CARE PLAN
Problem: Ventilation  Goal: Ability to achieve and maintain unassisted ventilation or tolerate decreased levels of ventilator support  Description: Target End Date:  4 days     Document on Vent flowsheet    1.  Support and monitor invasive and noninvasive mechanical ventilation  2.  Monitor ventilator weaning response  3.  Perform ventilator associated pneumonia prevention interventions  4.  Manage ventilation therapy by monitoring diagnostic test results  3/1/2024 1556 by Víctor Gerard, RRT  Outcome: Progressing  3/1/2024 1404 by Víctor Gerard, RRT  Outcome: Progressing   No vent weaning today. Trach placed in OR will begin weaning tomorrow.

## 2024-03-01 NOTE — PROGRESS NOTES
Have scheduled the patient tomorrow for the OR at 2:30 for tracheostomy tube placement and direct laryngoscopy/tracheoscopy.

## 2024-03-01 NOTE — ANESTHESIA TIME REPORT
Anesthesia Start and Stop Event Times       Date Time Event    3/1/2024 1316 Ready for Procedure     1433 Anesthesia Start     1546 Anesthesia Stop          Responsible Staff  03/01/24      Name Role Begin End    Inderjit Cade M.D. Anesth 1433 1540          Overtime Reason:  no overtime (within assigned shift)    Comments:

## 2024-03-01 NOTE — PROGRESS NOTES
"  Trauma / Surgical Daily Progress Note    Date of Service  2/29/2024    Chief Complaint  74 y.o. male admitted 2/27/2024 with laryngeal injury after syncopal event    Interval Events  CRITICAL CARE DECISION MAKING:    Patient examined and discussed with team at bedside.    Syncope: Cardiac parameters continue to improve.  No significant arrhythmias on monitor over the last 24 hours.  Carotid duplex is ordered.    Addressed pulmonary hygiene concerns as well as oxygenation/ventilation.   Patient remains sedated on the ventilator for airway protection until ENT takes him to the operating room for exploration, possible stabilization and tracheostomy.    Labs reviewed, electrolytes addressed, renal function assessed    Reviewed nutrition strategies, recent indices: Tube feeding at goal    Addressed GI prophylaxis and bowel frequency: Pepcid and bowel protocol  Assessed/discussed/titrated analgesics and need for sedatives: Propofol and as needed analgesics  Addressed DVT prophylaxis: Lovenox and SCDs  Addressed line days, nolan catheter days, access needs: Nolan catheter    Addressed family and discharge concerns: Family updated at bedside      Review of Systems  Review of Systems   Unable to perform ROS: Intubated        Vital Signs for last 24 hours  Pulse:  [60-95] 64  Resp:  [14-25] 17  BP: (110-181)/(52-86) 171/77  SpO2:  [88 %-99 %] 96 %    Hemodynamic parameters for last 24 hours   BP (!) 171/77   Pulse 64   Temp 37 °C (98.6 °F) (Temporal)   Resp 17   Ht 1.854 m (6' 0.99\")   Wt 113 kg (250 lb)   SpO2 96%   BMI 32.99 kg/m²     Respiratory Data     Respiration: 17, Pulse Oximetry: 96 %     Work Of Breathing / Effort: Vented  RUL Breath Sounds: Clear, RML Breath Sounds: Clear, RLL Breath Sounds: Diminished, WINSTON Breath Sounds: Clear, LLL Breath Sounds: Diminished    Physical Exam  Physical Exam  Vitals and nursing note reviewed.   Constitutional:       General: He is sleeping.      Interventions: He is " sedated, intubated and restrained. Cervical collar in place.   HENT:      Head: Normocephalic and atraumatic.      Nose:      Comments: NG tube     Mouth/Throat:      Mouth: Mucous membranes are moist.      Pharynx: Oropharynx is clear.   Eyes:      Conjunctiva/sclera: Conjunctivae normal.      Pupils: Pupils are equal, round, and reactive to light.   Neck:      Comments: Collar in place.  Trachea midline with subcutaneous emphysema over the anterior neck extending onto the upper chest wall  Cardiovascular:      Rate and Rhythm: Normal rate and regular rhythm.      Pulses: Normal pulses.   Pulmonary:      Effort: Pulmonary effort is normal. He is intubated.      Breath sounds: Normal breath sounds. No stridor.   Abdominal:      General: There is no distension.      Palpations: Abdomen is soft.      Tenderness: There is no abdominal tenderness.   Musculoskeletal:         General: Normal range of motion.      Right lower leg: No edema.      Left lower leg: No edema.   Skin:     General: Skin is warm and dry.      Coloration: Skin is not pale.   Neurological:      General: No focal deficit present.      Mental Status: He is easily aroused. He is disoriented.      GCS: GCS eye subscore is 2. GCS verbal subscore is 1. GCS motor subscore is 6.      Motor: No weakness.   Psychiatric:         Behavior: Behavior is cooperative.         Laboratory  Recent Results (from the past 24 hour(s))   POCT glucose device results    Collection Time: 02/28/24  5:46 PM   Result Value Ref Range    POC Glucose, Blood 181 (H) 65 - 99 mg/dL   POCT glucose device results    Collection Time: 02/28/24 11:37 PM   Result Value Ref Range    POC Glucose, Blood 227 (H) 65 - 99 mg/dL   Prealbumin    Collection Time: 02/29/24  5:35 AM   Result Value Ref Range    Pre-Albumin 18.2 18.0 - 38.0 mg/dL   CBC with Differential: Tomorrow AM    Collection Time: 02/29/24  5:35 AM   Result Value Ref Range    WBC 9.6 4.8 - 10.8 K/uL    RBC 3.96 (L) 4.70 - 6.10  M/uL    Hemoglobin 12.4 (L) 14.0 - 18.0 g/dL    Hematocrit 36.9 (L) 42.0 - 52.0 %    MCV 93.2 81.4 - 97.8 fL    MCH 31.3 27.0 - 33.0 pg    MCHC 33.6 32.3 - 36.5 g/dL    RDW 46.0 35.9 - 50.0 fL    Platelet Count 165 164 - 446 K/uL    MPV 9.6 9.0 - 12.9 fL    Neutrophils-Polys 80.10 (H) 44.00 - 72.00 %    Lymphocytes 8.70 (L) 22.00 - 41.00 %    Monocytes 9.50 0.00 - 13.40 %    Eosinophils 1.00 0.00 - 6.90 %    Basophils 0.40 0.00 - 1.80 %    Immature Granulocytes 0.30 0.00 - 0.90 %    Nucleated RBC 0.00 0.00 - 0.20 /100 WBC    Neutrophils (Absolute) 7.64 (H) 1.82 - 7.42 K/uL    Lymphs (Absolute) 0.83 (L) 1.00 - 4.80 K/uL    Monos (Absolute) 0.91 (H) 0.00 - 0.85 K/uL    Eos (Absolute) 0.10 0.00 - 0.51 K/uL    Baso (Absolute) 0.04 0.00 - 0.12 K/uL    Immature Granulocytes (abs) 0.03 0.00 - 0.11 K/uL    NRBC (Absolute) 0.00 K/uL   Comp Metabolic Panel (CMP): Tomorrow AM    Collection Time: 02/29/24  5:35 AM   Result Value Ref Range    Sodium 136 135 - 145 mmol/L    Potassium 4.2 3.6 - 5.5 mmol/L    Chloride 104 96 - 112 mmol/L    Co2 21 20 - 33 mmol/L    Anion Gap 11.0 7.0 - 16.0    Glucose 258 (H) 65 - 99 mg/dL    Bun 16 8 - 22 mg/dL    Creatinine 0.71 0.50 - 1.40 mg/dL    Calcium 8.3 (L) 8.5 - 10.5 mg/dL    Correct Calcium 8.8 8.5 - 10.5 mg/dL    AST(SGOT) 19 12 - 45 U/L    ALT(SGPT) 21 2 - 50 U/L    Alkaline Phosphatase 58 30 - 99 U/L    Total Bilirubin 0.6 0.1 - 1.5 mg/dL    Albumin 3.4 3.2 - 4.9 g/dL    Total Protein 6.1 6.0 - 8.2 g/dL    Globulin 2.7 1.9 - 3.5 g/dL    A-G Ratio 1.3 g/dL   Magnesium: Every Monday and Thursday AM    Collection Time: 02/29/24  5:35 AM   Result Value Ref Range    Magnesium 1.8 1.5 - 2.5 mg/dL   Phosphorus: Every Monday and Thursday AM    Collection Time: 02/29/24  5:35 AM   Result Value Ref Range    Phosphorus 2.7 2.5 - 4.5 mg/dL   CRP QUANTITIVE (NON-CARDIAC)    Collection Time: 02/29/24  5:35 AM   Result Value Ref Range    Stat C-Reactive Protein 8.48 (H) 0.00 - 0.75 mg/dL    Triglyceride    Collection Time: 02/29/24  5:35 AM   Result Value Ref Range    Triglycerides 153 (H) 0 - 149 mg/dL   ESTIMATED GFR    Collection Time: 02/29/24  5:35 AM   Result Value Ref Range    GFR (CKD-EPI) 96 >60 mL/min/1.73 m 2   POCT glucose device results    Collection Time: 02/29/24  5:45 AM   Result Value Ref Range    POC Glucose, Blood 263 (H) 65 - 99 mg/dL   POCT glucose device results    Collection Time: 02/29/24  9:01 AM   Result Value Ref Range    POC Glucose, Blood 255 (H) 65 - 99 mg/dL   POCT glucose device results    Collection Time: 02/29/24 12:23 PM   Result Value Ref Range    POC Glucose, Blood 229 (H) 65 - 99 mg/dL       Fluids    Intake/Output Summary (Last 24 hours) at 2/29/2024 1626  Last data filed at 2/29/2024 1250  Gross per 24 hour   Intake 1364.3 ml   Output 2713 ml   Net -1348.7 ml       Core Measures & Quality Metrics  EKG reviewed, Labs reviewed and Medications reviewed  Brooke catheter: Unconscious / Sedated Patient on a Ventilator      DVT Prophylaxis: Enoxaparin (Lovenox)  DVT prophylaxis - mechanical: SCDs  Ulcer prophylaxis: Yes    Assessed for rehab: Patient unable to tolerate rehabilitation therapeutic regimen    KATHYA Score  ETOH Screening    Assessment/Plan  Syncope and collapse- (present on admission)  Assessment & Plan  EKG sinus rhythm & left anterior fascicular block.  Trend troponin.   Echocardiogram with LVEF 50%.  Continuous cardiac monitoring.    Fracture closed, thyroid cartilage, initial encounter ([facility])- (present on admission)  Assessment & Plan  CT at [place] showing mildly displaced fracture of the left para midline aspect of the thyroid cartilage.  Bronchoscopy in ICU without evidence of tracheobronchial injury distal to the endotracheal tube however bloody sections noted proximally.  CT neck with thyroid cartilage fracture & suspect injury to cricoid cartilage.  Definitive plan pending. May require tracheostomy.  Flaco Contreras MD. [facility].    Pneumomediastinum ([facility])- (present on admission)  Assessment & Plan  Extensive gas is noted within the superficial and deep soft tissue planes of the upper chest and neck.  Opacification of a 4 cm segment of the trachea, distal to the cords, surrounding the endotracheal tube is present, and could represent a manifestation of tracheal injury.  CT neck with subcutaneous emphysema & pneumomediastinum.  ENT and GI consulted for concern for tracheal/esophageal injuries.  Flaco Contreras MD. [facility].  Don Armstrong MD. [facility].    Pneumothorax on left- (present on admission)  Assessment & Plan  Left chest tube placed by [place].  To continuous suction  Serial chest X Ray's   2/29 Water seal chest tube.    Respiratory failure following trauma ([facility])- (present on admission)  Assessment & Plan  Intubated at [place].  Continue full mechanical ventilatory support. Ventilator bundle and Trauma weaning protocol.    Elevated troponin- (present on admission)  Assessment & Plan  Admission troponin level 354.  EKG with SR without acute changes.  Otis Ni MD, Cardiology    History of chronic CHF- (present on admission)  Assessment & Plan  Per chart review.   History of dilated cardiomyopathy  2/28 Echocardiogram with LVEF 50%.    Diabetes ([facility])- (present on admission)  Assessment & Plan  Chronic condition treated with Insulin and metformin.  Holding maintenance metformin for 48 hours following intravenous contrast administration.  Insulin sliding scale coverage.    A-fib ([facility])- (present on admission)  Assessment & Plan  Chronic condition treated with Xarelto and Toprol Xl  EKG sinus rhythm.  Toprol XL resumed on admit.  Systemic anticoagulation held on admission.    No contraindication to deep vein thrombosis (DVT) prophylaxis- (present on admission)  Assessment & Plan  2/29 Prophylactic dose enoxaparin 40 mg BID initiated.     Trauma- (present on admission)  Assessment &  Plan  Walked into gas station stated he didn't feel well then had syncopal episode.  Trauma Red Transfer Activation from [facility] in [place], [place].  Taz Ward MD. Trauma Surgery.    High cholesterol- (present on admission)  Assessment & Plan  Chronic condition treated with Crestor  Resumed maintenance medication on admission.        Discussed patient condition with Family, RN, RT, Pharmacy, , and ENT.  CRITICAL CARE TIME EXCLUDING PROCEDURES: 36    minutes

## 2024-03-01 NOTE — ANESTHESIA POSTPROCEDURE EVALUATION
Patient: Parvez Peguero    Procedure Summary       Date: 03/01/24 Room / Location: [facility] OR 08 / SURGERY [facility]    Anesthesia Start: 1433 Anesthesia Stop: 1546    Procedures:       CREATION, TRACHEOSTOMY (Neck)      DIRECT LARYNGOSCOPY (Throat) Diagnosis: (FRACTURED)    Surgeons: Flaco Contreras M.D. Responsible Provider: Inderjit Cade M.D.    Anesthesia Type: general ASA Status: 4            Final Anesthesia Type: general  Last vitals  BP   Blood Pressure : (!) 181/79, Arterial BP: (!) 97/57    98    Pulse   86   Resp   (!) 21    SpO2   98 %      Anesthesia Post Evaluation    Patient location during evaluation: ICU  Patient participation: complete - patient participated  Level of consciousness: obtunded/minimal responses  Pain score: 0    Airway patency: patent  Anesthetic complications: no  Cardiovascular status: adequate and hemodynamically stable  Respiratory status: acceptable  Hydration status: acceptable    PONV: none          No notable events documented.     Nurse Pain Score: 5 (NPRS)

## 2024-03-01 NOTE — CARE PLAN
The patient is Watcher - Medium risk of patient condition declining or worsening    Shift Goals  Clinical Goals: improve oxygenation, stable hemodynamics  Patient Goals: VIANNEY  Family Goals: Updates on    Progress made toward(s) clinical / shift goals:    Problem: Knowledge Deficit - Standard  Goal: Patient and family/care givers will demonstrate understanding of plan of care, disease process/condition, diagnostic tests and medications  Outcome: Progressing     Problem: Safety - Medical Restraint  Goal: Remains free of injury from restraints (Restraint for Interference with Medical Device)  Outcome: Progressing  Goal: Free from restraint(s) (Restraint for Interference with Medical Device)  Outcome: Progressing     Problem: Fall Risk  Goal: Patient will remain free from falls  Outcome: Progressing     Problem: Pain - Standard  Goal: Alleviation of pain or a reduction in pain to the patient’s comfort goal  Outcome: Progressing       Patient is not progressing towards the following goals:

## 2024-03-01 NOTE — CARE PLAN
The patient is Watcher - Medium risk of patient condition declining or worsening    Shift Goals  Clinical Goals: improve oxygenation, stable hemodynamics  Patient Goals: VIANNEY  Family Goals: Updates on    Progress made toward(s) clinical / shift goals:    Problem: Knowledge Deficit - Standard  Goal: Patient and family/care givers will demonstrate understanding of plan of care, disease process/condition, diagnostic tests and medications  Outcome: Not Progressing     Problem: Safety - Medical Restraint  Goal: Remains free of injury from restraints (Restraint for Interference with Medical Device)  Outcome: Not Progressing     Problem: Pain - Standard  Goal: Alleviation of pain or a reduction in pain to the patient’s comfort goal  Outcome: Progressing       Patient is not progressing towards the following goals weaning ventilator      Problem: Knowledge Deficit - Standard  Goal: Patient and family/care givers will demonstrate understanding of plan of care, disease process/condition, diagnostic tests and medications  Outcome: Not Progressing     Problem: Safety - Medical Restraint  Goal: Remains free of injury from restraints (Restraint for Interference with Medical Device)  Outcome: Not Progressing

## 2024-03-01 NOTE — OR SURGEON
Immediate Post OP Note    PreOp Diagnosis: Laryngeal fracture with airway compromise      PostOp Diagnosis: same      Procedure(s):  CREATION, TRACHEOSTOMY  DIRECT LARYNGOSCOPY, Tracheoscopy    Surgeon(s):  Flaco Contreras M.D.    Anesthesiologist/Type of Anesthesia:  Anesthesiologist: Inderjit Cade M.D./General    Surgical Staff:  Circulator: Daysi Valerio R.N.  Scrub Person: Kyle Davis    Specimens removed if any:  * No specimens in log *    Estimated Blood Loss: minimal    Findings: +swollen tvc's but with a reasonable glottic airway, symmetric, arytenoids in the proper location, little evidence of mucosal tearing, subglottis and trachea normal down to the tracheosotomy tube.     Complications: none        3/1/2024 3:43 PM Flaco Contreras M.D.

## 2024-03-01 NOTE — DIETARY
Nutrition Services Brief Update:    Tube feed with Vital HP at goal rate of 65 ml/hour.    Problem: Nutritional:  Goal: Nutrition support tolerated and meeting greater than 85% of estimated needs  Outcome: Goal met.    RD following

## 2024-03-01 NOTE — CARE PLAN
Problem: Ventilation  Goal: Ability to achieve and maintain unassisted ventilation or tolerate decreased levels of ventilator support  Description: Target End Date:  4 days     Document on Vent flowsheet    1.  Support and monitor invasive and noninvasive mechanical ventilation  2.  Monitor ventilator weaning response  3.  Perform ventilator associated pneumonia prevention interventions  4.  Manage ventilation therapy by monitoring diagnostic test results  Outcome: Progressing   No vent weaning today, will begin tomorrow after trache.

## 2024-03-01 NOTE — PROGRESS NOTES
Trauma / Surgical Daily Progress Note    Date of Service  3/1/2024    Chief Complaint  74 y.o. male admitted 2/27/2024 with Trauma    Interval Events  Subcutaneous emphysema still present on exam and imaging  Scheduled for trache and endoscopy this AM    Review of Systems  Review of Systems   Unable to perform ROS: Intubated        Vital Signs for last 24 hours  Pulse:  [58-86] 86  Resp:  [11-32] 21  BP: (105-186)/() 181/79  SpO2:  [96 %-100 %] 98 %    Hemodynamic parameters for last 24 hours       Respiratory Data     Respiration: (!) 21, Pulse Oximetry: 98 %     Work Of Breathing / Effort: Vented  RUL Breath Sounds: Rhonchi, RML Breath Sounds: Rhonchi, RLL Breath Sounds: Rhonchi, WINSTON Breath Sounds: Rhonchi, LLL Breath Sounds: Rhonchi    Physical Exam  Physical Exam  Vitals and nursing note reviewed.   Constitutional:       General: He is sleeping.      Interventions: He is sedated, intubated and restrained. Cervical collar in place.   HENT:      Head: Normocephalic and atraumatic.      Nose:      Comments: NG tube     Mouth/Throat:      Mouth: Mucous membranes are moist.      Pharynx: Oropharynx is clear.   Eyes:      Conjunctiva/sclera: Conjunctivae normal.      Pupils: Pupils are equal, round, and reactive to light.   Neck:      Comments: Collar in place.  Trachea midline with subcutaneous emphysema over the anterior neck extending onto the upper chest wall, unchanged from previous days  Cardiovascular:      Rate and Rhythm: Normal rate and regular rhythm.      Pulses: Normal pulses.   Pulmonary:      Effort: Pulmonary effort is normal. He is intubated.      Breath sounds: Normal breath sounds. No stridor.   Abdominal:      General: There is no distension.      Palpations: Abdomen is soft.      Tenderness: There is no abdominal tenderness.   Musculoskeletal:         General: Normal range of motion.      Right lower leg: No edema.      Left lower leg: No edema.   Skin:     General: Skin is warm and dry.       Coloration: Skin is not pale.   Neurological:      General: No focal deficit present.      Mental Status: He is easily aroused. He is disoriented.      GCS: GCS eye subscore is 2. GCS verbal subscore is 1. GCS motor subscore is 6.      Motor: No weakness.   Psychiatric:         Behavior: Behavior is cooperative.         Laboratory  Recent Results (from the past 24 hour(s))   POCT glucose device results    Collection Time: 02/29/24  5:19 PM   Result Value Ref Range    POC Glucose, Blood 218 (H) 65 - 99 mg/dL   POCT glucose device results    Collection Time: 03/01/24 12:38 AM   Result Value Ref Range    POC Glucose, Blood 280 (H) 65 - 99 mg/dL   POCT arterial blood gas device results    Collection Time: 03/01/24  4:15 AM   Result Value Ref Range    Ph 7.422 7.400 - 7.500    Pco2 33.2 26.0 - 37.0 mmHg    Po2 76 64 - 87 mmHg    Tco2 23 20 - 33 mmol/L    S02 95 93 - 99 %    Hco3 21.6 17.0 - 25.0 mmol/L    BE -2 -4 - 3 mmol/L    Body Temp 100.9 F degrees    O2 Therapy 30 %    iPF Ratio 253     Ph Temp Cassandra 7.403 7.400 - 7.500    Pco2 Temp Co 35.1 26.0 - 37.0 mmHg    Po2 Temp Cor 82 64 - 87 mmHg    Specimen Arterial     Todd Test Pass     DelSys Vent     Peep End Expiratory Pressure 8 cmh20    Percent Minute Volume 100     Mode ASV    POCT lactate device results    Collection Time: 03/01/24  4:15 AM   Result Value Ref Range    iStat Lactate 0.5 0.5 - 2.0 mmol/L   CBC with Differential: Tomorrow AM    Collection Time: 03/01/24  5:55 AM   Result Value Ref Range    WBC 7.4 4.8 - 10.8 K/uL    RBC 3.64 (L) 4.70 - 6.10 M/uL    Hemoglobin 11.5 (L) 14.0 - 18.0 g/dL    Hematocrit 33.5 (L) 42.0 - 52.0 %    MCV 92.0 81.4 - 97.8 fL    MCH 31.6 27.0 - 33.0 pg    MCHC 34.3 32.3 - 36.5 g/dL    RDW 45.5 35.9 - 50.0 fL    Platelet Count 156 (L) 164 - 446 K/uL    MPV 9.6 9.0 - 12.9 fL    Neutrophils-Polys 76.80 (H) 44.00 - 72.00 %    Lymphocytes 10.10 (L) 22.00 - 41.00 %    Monocytes 10.80 0.00 - 13.40 %    Eosinophils 1.50 0.00 -  6.90 %    Basophils 0.40 0.00 - 1.80 %    Immature Granulocytes 0.40 0.00 - 0.90 %    Nucleated RBC 0.00 0.00 - 0.20 /100 WBC    Neutrophils (Absolute) 5.67 1.82 - 7.42 K/uL    Lymphs (Absolute) 0.75 (L) 1.00 - 4.80 K/uL    Monos (Absolute) 0.80 0.00 - 0.85 K/uL    Eos (Absolute) 0.11 0.00 - 0.51 K/uL    Baso (Absolute) 0.03 0.00 - 0.12 K/uL    Immature Granulocytes (abs) 0.03 0.00 - 0.11 K/uL    NRBC (Absolute) 0.00 K/uL   Comp Metabolic Panel (CMP): Tomorrow AM    Collection Time: 03/01/24  5:55 AM   Result Value Ref Range    Sodium 138 135 - 145 mmol/L    Potassium 3.9 3.6 - 5.5 mmol/L    Chloride 107 96 - 112 mmol/L    Co2 20 20 - 33 mmol/L    Anion Gap 11.0 7.0 - 16.0    Glucose 302 (H) 65 - 99 mg/dL    Bun 15 8 - 22 mg/dL    Creatinine 0.52 0.50 - 1.40 mg/dL    Calcium 7.9 (L) 8.5 - 10.5 mg/dL    Correct Calcium 8.6 8.5 - 10.5 mg/dL    AST(SGOT) 27 12 - 45 U/L    ALT(SGPT) 24 2 - 50 U/L    Alkaline Phosphatase 60 30 - 99 U/L    Total Bilirubin 0.4 0.1 - 1.5 mg/dL    Albumin 3.1 (L) 3.2 - 4.9 g/dL    Total Protein 5.7 (L) 6.0 - 8.2 g/dL    Globulin 2.6 1.9 - 3.5 g/dL    A-G Ratio 1.2 g/dL   ESTIMATED GFR    Collection Time: 03/01/24  5:55 AM   Result Value Ref Range    GFR (CKD-EPI) 105 >60 mL/min/1.73 m 2   POCT glucose device results    Collection Time: 03/01/24  6:16 AM   Result Value Ref Range    POC Glucose, Blood 246 (H) 65 - 99 mg/dL       Fluids    Intake/Output Summary (Last 24 hours) at 3/1/2024 1227  Last data filed at 3/1/2024 0800  Gross per 24 hour   Intake 2439.32 ml   Output 2200 ml   Net 239.32 ml       Core Measures & Quality Metrics  EKG reviewed, Labs reviewed and Medications reviewed  Brooke catheter: Unconscious / Sedated Patient on a Ventilator      DVT Prophylaxis: Enoxaparin (Lovenox)  DVT prophylaxis - mechanical: SCDs  Ulcer prophylaxis: Yes    Assessed for rehab: Patient unable to tolerate rehabilitation therapeutic regimen    RAP Score Total: 10    CAGE Results: not completed Blood  Alcohol>0.08: no       Assessment/Plan  Syncope and collapse- (present on admission)  Assessment & Plan  EKG sinus rhythm & left anterior fascicular block.  Trend troponin.   Echocardiogram with LVEF 50%.  Continuous cardiac monitoring  3/1 Some ectopy overnight self limited and seems to be asymptomatic, reviewed electrolytes WNL    Fracture closed, thyroid cartilage, initial encounter (HCC)- (present on admission)  Assessment & Plan  CT at [facility] showing mildly displaced fracture of the left para midline aspect of the thyroid cartilage.  Bronchoscopy in ICU without evidence of tracheobronchial injury distal to the endotracheal tube however bloody sections noted proximally.  CT neck with thyroid cartilage fracture & suspect injury to cricoid cartilage.  Definitive plan pending. May require tracheostomy.  3/1 Pending tracheostomy with ENT.  Flaco Contreras MD. [facility].    Pneumomediastinum ([facility])- (present on admission)  Assessment & Plan  Extensive gas is noted within the superficial and deep soft tissue planes of the upper chest and neck.  Opacification of a 4 cm segment of the trachea, distal to the cords, surrounding the endotracheal tube is present, and could represent a manifestation of tracheal injury.  CT neck with subcutaneous emphysema & pneumomediastinum.  ENT and GI consulted for concern for tracheal/esophageal injuries.  Flaco Contreras MD. [facility].  Don Armstrong MD. [facility].    Pneumothorax on left- (present on admission)  Assessment & Plan  Left chest tube placed by [facility].  To continuous suction  Serial chest X Ray's   2/29 Water seal chest tube.  3/1 Will keep chest tube until definitive management of airway    Respiratory failure following trauma (HCC)- (present on admission)  Assessment & Plan  Intubated at [facility].  3/1 Endoscopic evaluation of airway and trach placement today with ENT    Elevated troponin- (present on  admission)  Assessment & Plan  Admission troponin level 354.  EKG with SR without acute changes.  Otis Ni MD, Cardiology    History of chronic CHF- (present on admission)  Assessment & Plan  Per chart review.   History of dilated cardiomyopathy  2/28 Echocardiogram with LVEF 50%.    Diabetes (HCC)- (present on admission)  Assessment & Plan  Chronic condition treated with Insulin and metformin.  Holding maintenance metformin for 48 hours following intravenous contrast administration.  Insulin sliding scale coverage.  3/1 15 u lantus resumed.     A-fib (HCC)- (present on admission)  Assessment & Plan  Chronic condition treated with Xarelto and Toprol Xl  EKG sinus rhythm.  Toprol XL resumed on admit.  Systemic anticoagulation held on admission.    No contraindication to deep vein thrombosis (DVT) prophylaxis- (present on admission)  Assessment & Plan  2/29 Prophylactic dose enoxaparin 40 mg BID initiated.     Trauma- (present on admission)  Assessment & Plan  Walked into gas station stated he didn't feel well then had syncopal episode.  Trauma Red Transfer Activation from [facility] in [place], [place].  Taz Ward MD. Trauma Surgery.    High cholesterol- (present on admission)  Assessment & Plan  Chronic condition treated with Crestor  Resumed maintenance medication on admission.    I independently reviewed pertinent clinical lab tests since admission and ordered additional follow up clinical lab tests.  I independently reviewed pertinent radiographic images and the radiologist's reports since admission and ordered additional follow up radiographic studies.  The details of the available patient records in [facility] (including laboratory tests, culture data, medications, imaging, and other pertinent diagnostic tests) and documentation by consulting physicians (when applicable) were reviewed, summated, and that information was utilized as warranted in today's medical decision making for this patient.  I  personally evaluated the patient condition at bedside, discussed the daily plan(s) with available nursing staff, dieticians, social workers, pharmacists on multi-disciplinary rounds, and performed frequent reassessments through out the day as clinically warranted.  I evaluated the patient's condition at bedside.    The patient remains critically ill with acute respiratory failure    He is requiring Review of interval medical and surgical history, current medications and outpatient medication reconciliation, interval imaging studies and radiologist interpretation, and interval laboratory values.  Management of respiratory failure and laryngeal/tracheal injury.  Management of atrial fibrillation with likely cardiogenic syncopal episode  Multidisciplinary evaluation and management of complex post acute care discharge issues and integration of multiple data points and associated complex medical decision making, active ventilator management, management of cardiac arrhythmias, and management of thrombotic surveillance and risk mitigation involving high complexity decision making and medically necessary care by providing frequent assessment, manipulation, and support of cardiac function and pulmonary function to prevent further life-threatening deterioration of the patient's condition.     This patient requires continued ICU management and hospital admission.  The patient has impairment of one or more vital organ systems and a high probability of imminent or life-threatening deterioration in condition.     Aggregated critical care time spent evaluating, reassessing, reviewing documentation, providing care, and managing this patient exclusive of procedures: 37 minutes    Migue Dawson MD

## 2024-03-01 NOTE — ANESTHESIA PREPROCEDURE EVALUATION
Case: [id number] Date/Time: 03/01/24 1345    Procedures:       CREATION, TRACHEOSTOMY      DIRECT LARYNGOSCOPY (Throat)    Location: [facility] OR 08 / SURGERY [facility]    Surgeons: Flaco Contreras M.D.            Relevant Problems   CARDIAC   (positive) A-fib (HCC)   (positive) CAD (coronary artery disease)   (positive) Persistent atrial fibrillation (HCC)   (positive) Stented coronary artery      Other   (positive) Arthritis       Physical Exam    Airway   Mallampati: II  TM distance: >3 FB  Neck ROM: full       Cardiovascular - normal exam  Rhythm: regular  Rate: normal  (-) murmur     Dental - normal exam           Pulmonary - normal exam  Breath sounds clear to auscultation     Abdominal    Neurological - normal exam                   Anesthesia Plan    ASA 4       Plan - general       Airway plan will be Tracheostomy          Induction: intravenous    Postoperative Plan: Postoperative administration of opioids is intended.    Pertinent diagnostic labs and testing reviewed    Informed Consent:    Anesthetic plan and risks discussed with patient.    Use of blood products discussed with: patient whom consented to blood products.

## 2024-03-02 ENCOUNTER — APPOINTMENT (OUTPATIENT)
Dept: RADIOLOGY | Facility: MEDICAL CENTER | Age: 74
DRG: 004 | End: 2024-03-02
Attending: SURGERY
Payer: COMMERCIAL

## 2024-03-02 PROBLEM — R50.9 FEVER: Status: ACTIVE | Noted: 2024-03-02

## 2024-03-02 LAB
ALBUMIN SERPL BCP-MCNC: 3.5 G/DL (ref 3.2–4.9)
ALBUMIN/GLOB SERPL: 1.1 G/DL
ALP SERPL-CCNC: 88 U/L (ref 30–99)
ALT SERPL-CCNC: 66 U/L (ref 2–50)
ANION GAP SERPL CALC-SCNC: 12 MMOL/L (ref 7–16)
AST SERPL-CCNC: 88 U/L (ref 12–45)
BASOPHILS # BLD AUTO: 0.5 % (ref 0–1.8)
BASOPHILS # BLD: 0.04 K/UL (ref 0–0.12)
BILIRUB SERPL-MCNC: 0.6 MG/DL (ref 0.1–1.5)
BUN SERPL-MCNC: 17 MG/DL (ref 8–22)
CALCIUM ALBUM COR SERPL-MCNC: 8.9 MG/DL (ref 8.5–10.5)
CALCIUM SERPL-MCNC: 8.5 MG/DL (ref 8.5–10.5)
CHLORIDE SERPL-SCNC: 105 MMOL/L (ref 96–112)
CO2 SERPL-SCNC: 22 MMOL/L (ref 20–33)
CREAT SERPL-MCNC: 0.72 MG/DL (ref 0.5–1.4)
EOSINOPHIL # BLD AUTO: 0.12 K/UL (ref 0–0.51)
EOSINOPHIL NFR BLD: 1.5 % (ref 0–6.9)
ERYTHROCYTE [DISTWIDTH] IN BLOOD BY AUTOMATED COUNT: 46.5 FL (ref 35.9–50)
GFR SERPLBLD CREATININE-BSD FMLA CKD-EPI: 95 ML/MIN/1.73 M 2
GLOBULIN SER CALC-MCNC: 3.2 G/DL (ref 1.9–3.5)
GLUCOSE BLD STRIP.AUTO-MCNC: 271 MG/DL (ref 65–99)
GLUCOSE BLD STRIP.AUTO-MCNC: 289 MG/DL (ref 65–99)
GLUCOSE BLD STRIP.AUTO-MCNC: 290 MG/DL (ref 65–99)
GLUCOSE BLD STRIP.AUTO-MCNC: 314 MG/DL (ref 65–99)
GLUCOSE SERPL-MCNC: 325 MG/DL (ref 65–99)
HCT VFR BLD AUTO: 37.5 % (ref 42–52)
HGB BLD-MCNC: 12.3 G/DL (ref 14–18)
IMM GRANULOCYTES # BLD AUTO: 0.02 K/UL (ref 0–0.11)
IMM GRANULOCYTES NFR BLD AUTO: 0.3 % (ref 0–0.9)
LYMPHOCYTES # BLD AUTO: 0.89 K/UL (ref 1–4.8)
LYMPHOCYTES NFR BLD: 11.4 % (ref 22–41)
MCH RBC QN AUTO: 31.3 PG (ref 27–33)
MCHC RBC AUTO-ENTMCNC: 32.8 G/DL (ref 32.3–36.5)
MCV RBC AUTO: 95.4 FL (ref 81.4–97.8)
MONOCYTES # BLD AUTO: 0.95 K/UL (ref 0–0.85)
MONOCYTES NFR BLD AUTO: 12.2 % (ref 0–13.4)
NEUTROPHILS # BLD AUTO: 5.77 K/UL (ref 1.82–7.42)
NEUTROPHILS NFR BLD: 74.1 % (ref 44–72)
NRBC # BLD AUTO: 0 K/UL
NRBC BLD-RTO: 0 /100 WBC (ref 0–0.2)
PLATELET # BLD AUTO: 190 K/UL (ref 164–446)
PMV BLD AUTO: 10 FL (ref 9–12.9)
POTASSIUM SERPL-SCNC: 4.3 MMOL/L (ref 3.6–5.5)
PROT SERPL-MCNC: 6.7 G/DL (ref 6–8.2)
RBC # BLD AUTO: 3.93 M/UL (ref 4.7–6.1)
SODIUM SERPL-SCNC: 139 MMOL/L (ref 135–145)
WBC # BLD AUTO: 7.8 K/UL (ref 4.8–10.8)

## 2024-03-02 PROCEDURE — A9270 NON-COVERED ITEM OR SERVICE: HCPCS | Performed by: NURSE PRACTITIONER

## 2024-03-02 PROCEDURE — 99232 SBSQ HOSP IP/OBS MODERATE 35: CPT | Performed by: STUDENT IN AN ORGANIZED HEALTH CARE EDUCATION/TRAINING PROGRAM

## 2024-03-02 PROCEDURE — A9270 NON-COVERED ITEM OR SERVICE: HCPCS | Performed by: INTERNAL MEDICINE

## 2024-03-02 PROCEDURE — 700102 HCHG RX REV CODE 250 W/ 637 OVERRIDE(OP): Performed by: STUDENT IN AN ORGANIZED HEALTH CARE EDUCATION/TRAINING PROGRAM

## 2024-03-02 PROCEDURE — 700102 HCHG RX REV CODE 250 W/ 637 OVERRIDE(OP): Performed by: SURGERY

## 2024-03-02 PROCEDURE — 94799 UNLISTED PULMONARY SVC/PX: CPT

## 2024-03-02 PROCEDURE — 700102 HCHG RX REV CODE 250 W/ 637 OVERRIDE(OP)

## 2024-03-02 PROCEDURE — 700111 HCHG RX REV CODE 636 W/ 250 OVERRIDE (IP): Performed by: NURSE PRACTITIONER

## 2024-03-02 PROCEDURE — A9270 NON-COVERED ITEM OR SERVICE: HCPCS | Performed by: STUDENT IN AN ORGANIZED HEALTH CARE EDUCATION/TRAINING PROGRAM

## 2024-03-02 PROCEDURE — 71045 X-RAY EXAM CHEST 1 VIEW: CPT

## 2024-03-02 PROCEDURE — 700102 HCHG RX REV CODE 250 W/ 637 OVERRIDE(OP): Performed by: INTERNAL MEDICINE

## 2024-03-02 PROCEDURE — 700111 HCHG RX REV CODE 636 W/ 250 OVERRIDE (IP): Mod: JZ | Performed by: ANESTHESIOLOGY

## 2024-03-02 PROCEDURE — A9270 NON-COVERED ITEM OR SERVICE: HCPCS | Performed by: SURGERY

## 2024-03-02 PROCEDURE — 85025 COMPLETE CBC W/AUTO DIFF WBC: CPT

## 2024-03-02 PROCEDURE — 94003 VENT MGMT INPAT SUBQ DAY: CPT

## 2024-03-02 PROCEDURE — 700101 HCHG RX REV CODE 250: Performed by: STUDENT IN AN ORGANIZED HEALTH CARE EDUCATION/TRAINING PROGRAM

## 2024-03-02 PROCEDURE — 82962 GLUCOSE BLOOD TEST: CPT | Mod: 91

## 2024-03-02 PROCEDURE — 80053 COMPREHEN METABOLIC PANEL: CPT

## 2024-03-02 PROCEDURE — 94640 AIRWAY INHALATION TREATMENT: CPT

## 2024-03-02 PROCEDURE — 700111 HCHG RX REV CODE 636 W/ 250 OVERRIDE (IP): Mod: JZ

## 2024-03-02 PROCEDURE — 700102 HCHG RX REV CODE 250 W/ 637 OVERRIDE(OP): Performed by: NURSE PRACTITIONER

## 2024-03-02 PROCEDURE — A9270 NON-COVERED ITEM OR SERVICE: HCPCS

## 2024-03-02 PROCEDURE — 770022 HCHG ROOM/CARE - ICU (200)

## 2024-03-02 PROCEDURE — 700105 HCHG RX REV CODE 258: Performed by: STUDENT IN AN ORGANIZED HEALTH CARE EDUCATION/TRAINING PROGRAM

## 2024-03-02 RX ORDER — TERAZOSIN 2 MG/1
2 CAPSULE ORAL EVERY EVENING
Status: DISCONTINUED | OUTPATIENT
Start: 2024-03-02 | End: 2024-03-11

## 2024-03-02 RX ORDER — DEXMEDETOMIDINE HYDROCHLORIDE 4 UG/ML
.1-1.5 INJECTION, SOLUTION INTRAVENOUS CONTINUOUS
Status: DISCONTINUED | OUTPATIENT
Start: 2024-03-02 | End: 2024-03-06

## 2024-03-02 RX ORDER — TERAZOSIN 5 MG/1
5 CAPSULE ORAL EVERY EVENING
Status: DISCONTINUED | OUTPATIENT
Start: 2024-03-02 | End: 2024-03-02

## 2024-03-02 RX ORDER — LIDOCAINE HYDROCHLORIDE 10 MG/ML
INJECTION, SOLUTION INFILTRATION; PERINEURAL
Status: DISCONTINUED
Start: 2024-03-02 | End: 2024-03-02

## 2024-03-02 RX ORDER — GLYCOPYRROLATE 1 MG/1
1 TABLET ORAL 3 TIMES DAILY
Status: DISCONTINUED | OUTPATIENT
Start: 2024-03-02 | End: 2024-03-11

## 2024-03-02 RX ADMIN — DOCUSATE SODIUM 100 MG: 50 LIQUID ORAL at 18:17

## 2024-03-02 RX ADMIN — GABAPENTIN 300 MG: 300 CAPSULE ORAL at 16:59

## 2024-03-02 RX ADMIN — INSULIN HUMAN 7 UNITS: 100 INJECTION, SOLUTION PARENTERAL at 17:04

## 2024-03-02 RX ADMIN — FAMOTIDINE 20 MG: 20 TABLET, FILM COATED ORAL at 05:28

## 2024-03-02 RX ADMIN — METOPROLOL TARTRATE 25 MG: 25 TABLET, FILM COATED ORAL at 05:28

## 2024-03-02 RX ADMIN — LEVOTHYROXINE SODIUM 75 MCG: 0.07 TABLET ORAL at 05:28

## 2024-03-02 RX ADMIN — ACETAMINOPHEN 650 MG: 325 TABLET, FILM COATED ORAL at 00:13

## 2024-03-02 RX ADMIN — PROPOFOL 60 MCG/KG/MIN: 10 INJECTION, EMULSION INTRAVENOUS at 03:50

## 2024-03-02 RX ADMIN — INSULIN HUMAN 7 UNITS: 100 INJECTION, SOLUTION PARENTERAL at 00:05

## 2024-03-02 RX ADMIN — METOPROLOL TARTRATE 25 MG: 25 TABLET, FILM COATED ORAL at 16:59

## 2024-03-02 RX ADMIN — INSULIN HUMAN 10 UNITS: 100 INJECTION, SOLUTION PARENTERAL at 05:17

## 2024-03-02 RX ADMIN — LISINOPRIL 5 MG: 5 TABLET ORAL at 05:28

## 2024-03-02 RX ADMIN — TERAZOSIN HYDROCHLORIDE 2 MG: 2 CAPSULE ORAL at 16:59

## 2024-03-02 RX ADMIN — ENOXAPARIN SODIUM 40 MG: 100 INJECTION SUBCUTANEOUS at 16:58

## 2024-03-02 RX ADMIN — OXYCODONE HYDROCHLORIDE 10 MG: 10 TABLET ORAL at 16:58

## 2024-03-02 RX ADMIN — PROPOFOL 60 MCG/KG/MIN: 10 INJECTION, EMULSION INTRAVENOUS at 00:14

## 2024-03-02 RX ADMIN — ACETAMINOPHEN 650 MG: 325 TABLET, FILM COATED ORAL at 16:59

## 2024-03-02 RX ADMIN — POLYETHYLENE GLYCOL 3350 1 PACKET: 17 POWDER, FOR SOLUTION ORAL at 05:28

## 2024-03-02 RX ADMIN — MAGNESIUM HYDROXIDE 30 ML: 1200 LIQUID ORAL at 05:28

## 2024-03-02 RX ADMIN — PROPOFOL 15 MCG/KG/MIN: 10 INJECTION, EMULSION INTRAVENOUS at 08:53

## 2024-03-02 RX ADMIN — HYDROMORPHONE HYDROCHLORIDE 0.5 MG: 1 INJECTION, SOLUTION INTRAMUSCULAR; INTRAVENOUS; SUBCUTANEOUS at 15:06

## 2024-03-02 RX ADMIN — GLYCOPYRROLATE 1 MG: 1 TABLET ORAL at 16:01

## 2024-03-02 RX ADMIN — OXYCODONE HYDROCHLORIDE 10 MG: 10 TABLET ORAL at 20:34

## 2024-03-02 RX ADMIN — DEXMEDETOMIDINE HYDROCHLORIDE 0.8 MCG/KG/HR: 100 INJECTION, SOLUTION INTRAVENOUS at 19:47

## 2024-03-02 RX ADMIN — DEXMEDETOMIDINE HYDROCHLORIDE 0.2 MCG/KG/HR: 100 INJECTION, SOLUTION INTRAVENOUS at 13:35

## 2024-03-02 RX ADMIN — OXYCODONE HYDROCHLORIDE 10 MG: 10 TABLET ORAL at 11:38

## 2024-03-02 RX ADMIN — ROSUVASTATIN CALCIUM 20 MG: 20 TABLET, FILM COATED ORAL at 05:28

## 2024-03-02 RX ADMIN — GABAPENTIN 300 MG: 300 CAPSULE ORAL at 05:28

## 2024-03-02 RX ADMIN — ACETAMINOPHEN 650 MG: 325 TABLET, FILM COATED ORAL at 10:31

## 2024-03-02 RX ADMIN — DOCUSATE SODIUM 100 MG: 50 LIQUID ORAL at 05:28

## 2024-03-02 RX ADMIN — OXYCODONE 5 MG: 5 TABLET ORAL at 07:43

## 2024-03-02 RX ADMIN — DOCUSATE SODIUM 50 MG AND SENNOSIDES 8.6 MG 1 TABLET: 8.6; 5 TABLET, FILM COATED ORAL at 20:34

## 2024-03-02 RX ADMIN — INSULIN HUMAN 7 UNITS: 100 INJECTION, SOLUTION PARENTERAL at 11:44

## 2024-03-02 RX ADMIN — FENTANYL CITRATE 50 MCG: 50 INJECTION, SOLUTION INTRAMUSCULAR; INTRAVENOUS at 12:37

## 2024-03-02 RX ADMIN — POLYETHYLENE GLYCOL 3350 1 PACKET: 17 POWDER, FOR SOLUTION ORAL at 18:17

## 2024-03-02 ASSESSMENT — PAIN DESCRIPTION - PAIN TYPE
TYPE: ACUTE PAIN

## 2024-03-02 NOTE — CARE PLAN
The patient is Watcher - Medium risk of patient condition declining or worsening    Shift Goals  Clinical Goals: improve secretions  Patient Goals: oziel  Family Goals: oziel    Progress made toward(s) clinical / shift goals:  yes  Problem: Safety - Medical Restraint  Goal: Remains free of injury from restraints (Restraint for Interference with Medical Device)  Outcome: Progressing       Patient is not progressing towards the following goals:

## 2024-03-02 NOTE — ASSESSMENT & PLAN NOTE
Temperature 100-101 F, responding to tylenol.  Signficant secretion burden, likely from this, no clearly purulent, normal wbc count.  Continue to monitor and treat with tylenol for now.

## 2024-03-02 NOTE — CARE PLAN
Problem: Ventilation  Goal: Ability to achieve and maintain unassisted ventilation or tolerate decreased levels of ventilator support  Description: Target End Date:  4 days     Document on Vent flowsheet    1.  Support and monitor invasive and noninvasive mechanical ventilation  2.  Monitor ventilator weaning response  3.  Perform ventilator associated pneumonia prevention interventions  4.  Manage ventilation therapy by monitoring diagnostic test results  Outcome: Progressing     Problem: Aerosol Therapy  Goal: Improved hydration/ability to mobilize secretions and/or decreased airway edema  Description: Target End Date:  resolve prior to discharge or when underlying condition is resolved/stabilized    1.  Implement heated or cool aerosol therapy  2.  Assessed for optimal hydration, decreased edema and/or improved ability to mobilize secretions  Outcome: Progressing   Pt tolerating aerosol therapy well, will try for 24 hours off vent.

## 2024-03-02 NOTE — OP REPORT
DATE OF SERVICE:  03/01/2024     PREOPERATIVE DIAGNOSES:  1.  Laryngeal fracture.  2.  Airway compromise secondary to laryngeal fracture.     POSTOPERATIVE DIAGNOSES:  1.  Laryngeal fracture.  2.  Airway compromise secondary to laryngeal fracture.     PROCEDURES:  1.  Tracheostomy tube placement.  2.  Direct laryngoscopy and tracheoscopy.     SURGEON:  Flaco Contreras M.D.     ANESTHESIOLOGIST:  Inderjit Cade M.D.     INDICATIONS:  The patient is a 74-year-old who suffered a ground level fall on   Tuesday.  The patient was intubated and transferred to [facility].    He has been stable, though has had a fair amount of subcutaneous air.  CT   scanning demonstrated a left-sided paramedian fracture through the thyroid   cartilage.  It was not comminuted and fairly minimally displaced in a lateral   fashion.  There appeared to be a left paramedian posterior cricoid wall   fracture as well.  The patient now presents for tracheostomy tube placement   and direct laryngoscopy with tracheoscopy.     DESCRIPTION OF PROCEDURE:  The patient was taken to the operating room.  He   was anesthetized while on his ICU bed and the procedure was done on the ICU   bed.  The neck collar was removed as I had been assured that there were no   C-spine injuries and the patient was placed in a slightly extended position.    A 1% lidocaine with 1:100,000 epinephrine was used to infiltrate in the   proposed tracheostomy site.  The patient had a fairly low thyroid notch, but   it did elevate reasonably with the positioning such as to allow enough room   for tracheostomy tube placement.  The patient was then prepped and draped in   the usual sterile fashion.  The proposed incision was drawn on the patient's   skin and then made with a 15 blade.  Dissection proceeded through the   subcutaneous tissue down to the strap muscles, which were somewhat difficult   to identify in terms of the midline.  This was, however, discovered and the    straps on each side grasped with Allis clamps.  They were divided down to the   thyroid isthmus.  A plane was developed along the inferior border of the   isthmus and deep to the isthmus.  The isthmus was then divided with a paddle   tip Bovie cautery.  There was very little bleeding.  An incision was then made   in the 3rd tracheal interspace transversely across the trachea and then   inferiorly on both sides with a curved Jack scissors.  A stay stitch was   placed through this tracheal flap using 2-0 silk.  The endotracheal tube was   then carefully withdrawn to where the tip could be seen just at the superior   aspect of the incision.  The trachea was dilated with a trach dilator and then   a #8 Portex tracheostomy tube placed and the cuff inflated and the inner   cannula placed and the patient put on the ventilator.  CO2 return and   oxygenation were normal after this was accomplished.  The incision was then   partially closed with 2-0 silk and then the tracheostomy tube sutured to the   patient's skin through the platform with 2-0 silk.  A soft dressing was   applied and then a trach sponge also applied underneath the trach platform.    Attention was then directed towards the oral cavity.  A tooth guard was placed   over the patient's maxillary teeth.  Direct laryngoscopy was then performed.    The patient had moderate arytenoid swelling, but they were in the appropriate   location.  The scope was advanced into the glottis.  There was swelling of   the cords as well.  A photo was taken.  A 5 mm rigid endoscope was then   introduced through the cords into the subglottis.  There did not appear to be   any subglottic process with very little bleeding and no asymmetry.  Any   subglottic narrowing and the tracheostomy tube was easily seen once the _____   scope was introduced through the cords.  Photos were taken of the glottis.  I   thought I had taken one of the subglottis as well, but that did not end up    being accomplished.  The scopes were then removed, as was the tooth guard and   the patient taken back to the trauma ICU in stable condition.  He tolerated   the procedure well and there were no complications.        ______________________________  MD ISAAC FIGUEROA/RAVIN/CARLOS MANUEL    DD:  03/01/2024 15:52  DT:  03/01/2024 16:25    Job#:  [id number]

## 2024-03-02 NOTE — PROGRESS NOTES
Trauma / Surgical Daily Progress Note    Date of Service  3/2/2024    Chief Complaint  74 y.o. male admitted 2/27/2024 with Trauma    Interval Events  POD 1 tracheostomy with ENT  Passed SAT and SBT but coughing frequently  Switched to trach collar and coughing much improved    Vital Signs for last 24 hours  Pulse:  [60-94] 68  Resp:  [13-31] 20  BP: (122-194)/(58-91) 153/70  SpO2:  [91 %-100 %] 95 %    Hemodynamic parameters for last 24 hours       Respiratory Data     Respiration: 20, Pulse Oximetry: 95 %     Work Of Breathing / Effort: Vented  RUL Breath Sounds: Rhonchi, RML Breath Sounds: Rhonchi, RLL Breath Sounds: Rhonchi, WINSTON Breath Sounds: Rhonchi, LLL Breath Sounds: Rhonchi    Physical Exam  Physical Exam  Vitals and nursing note reviewed.   Constitutional:       General: He is not in acute distress.     Interventions: He is sedated, intubated and restrained. Cervical collar in place.   HENT:      Head: Normocephalic and atraumatic.      Nose:      Comments: NG tube     Mouth/Throat:      Mouth: Mucous membranes are moist.      Pharynx: Oropharynx is clear.   Eyes:      Conjunctiva/sclera: Conjunctivae normal.      Pupils: Pupils are equal, round, and reactive to light.   Neck:      Comments: Tracheostomy in place, some bloody debris within and around trach insertion site.  No signs of active bleeding.  Subcutaneous emphysema still present, mild improvement  Cardiovascular:      Rate and Rhythm: Normal rate and regular rhythm.      Pulses: Normal pulses.   Pulmonary:      Effort: Pulmonary effort is normal. He is intubated.      Breath sounds: Normal breath sounds. No stridor.   Abdominal:      General: Abdomen is flat. There is no distension.      Palpations: Abdomen is soft.   Musculoskeletal:         General: Normal range of motion.      Right lower leg: No edema.      Left lower leg: No edema.   Skin:     General: Skin is warm and dry.   Neurological:      General: No focal deficit present.       Mental Status: He is alert and easily aroused.   Psychiatric:         Behavior: Behavior is cooperative.         Laboratory  Recent Results (from the past 24 hour(s))   POCT glucose device results    Collection Time: 03/01/24  1:07 PM   Result Value Ref Range    POC Glucose, Blood 235 (H) 65 - 99 mg/dL   POCT glucose device results    Collection Time: 03/01/24  5:31 PM   Result Value Ref Range    POC Glucose, Blood 241 (H) 65 - 99 mg/dL   POCT glucose device results    Collection Time: 03/02/24 12:02 AM   Result Value Ref Range    POC Glucose, Blood 290 (H) 65 - 99 mg/dL   POCT glucose device results    Collection Time: 03/02/24  5:16 AM   Result Value Ref Range    POC Glucose, Blood 314 (H) 65 - 99 mg/dL   CBC with Differential: Tomorrow AM    Collection Time: 03/02/24  5:20 AM   Result Value Ref Range    WBC 7.8 4.8 - 10.8 K/uL    RBC 3.93 (L) 4.70 - 6.10 M/uL    Hemoglobin 12.3 (L) 14.0 - 18.0 g/dL    Hematocrit 37.5 (L) 42.0 - 52.0 %    MCV 95.4 81.4 - 97.8 fL    MCH 31.3 27.0 - 33.0 pg    MCHC 32.8 32.3 - 36.5 g/dL    RDW 46.5 35.9 - 50.0 fL    Platelet Count 190 164 - 446 K/uL    MPV 10.0 9.0 - 12.9 fL    Neutrophils-Polys 74.10 (H) 44.00 - 72.00 %    Lymphocytes 11.40 (L) 22.00 - 41.00 %    Monocytes 12.20 0.00 - 13.40 %    Eosinophils 1.50 0.00 - 6.90 %    Basophils 0.50 0.00 - 1.80 %    Immature Granulocytes 0.30 0.00 - 0.90 %    Nucleated RBC 0.00 0.00 - 0.20 /100 WBC    Neutrophils (Absolute) 5.77 1.82 - 7.42 K/uL    Lymphs (Absolute) 0.89 (L) 1.00 - 4.80 K/uL    Monos (Absolute) 0.95 (H) 0.00 - 0.85 K/uL    Eos (Absolute) 0.12 0.00 - 0.51 K/uL    Baso (Absolute) 0.04 0.00 - 0.12 K/uL    Immature Granulocytes (abs) 0.02 0.00 - 0.11 K/uL    NRBC (Absolute) 0.00 K/uL   Comp Metabolic Panel (CMP): Tomorrow AM    Collection Time: 03/02/24  5:20 AM   Result Value Ref Range    Sodium 139 135 - 145 mmol/L    Potassium 4.3 3.6 - 5.5 mmol/L    Chloride 105 96 - 112 mmol/L    Co2 22 20 - 33 mmol/L    Anion Gap 12.0  7.0 - 16.0    Glucose 325 (H) 65 - 99 mg/dL    Bun 17 8 - 22 mg/dL    Creatinine 0.72 0.50 - 1.40 mg/dL    Calcium 8.5 8.5 - 10.5 mg/dL    Correct Calcium 8.9 8.5 - 10.5 mg/dL    AST(SGOT) 88 (H) 12 - 45 U/L    ALT(SGPT) 66 (H) 2 - 50 U/L    Alkaline Phosphatase 88 30 - 99 U/L    Total Bilirubin 0.6 0.1 - 1.5 mg/dL    Albumin 3.5 3.2 - 4.9 g/dL    Total Protein 6.7 6.0 - 8.2 g/dL    Globulin 3.2 1.9 - 3.5 g/dL    A-G Ratio 1.1 g/dL   ESTIMATED GFR    Collection Time: 03/02/24  5:20 AM   Result Value Ref Range    GFR (CKD-EPI) 95 >60 mL/min/1.73 m 2       Fluids    Intake/Output Summary (Last 24 hours) at 3/2/2024 1140  Last data filed at 3/2/2024 1025  Gross per 24 hour   Intake 2521.5 ml   Output 2175 ml   Net 346.5 ml       Core Measures & Quality Metrics  EKG reviewed, Labs reviewed and Medications reviewed  Brooke catheter: No Brooke      DVT Prophylaxis: Enoxaparin (Lovenox)  DVT prophylaxis - mechanical: SCDs  Ulcer prophylaxis: Not indicated    Assessed for rehab: Patient unable to tolerate rehabilitation therapeutic regimen    RAP Score Total: 10    CAGE Results: not completed Blood Alcohol>0.08: no       Assessment/Plan  Syncope and collapse- (present on admission)  Assessment & Plan  EKG sinus rhythm & left anterior fascicular block.  Trend troponin.   Echocardiogram with LVEF 50%.  Continuous cardiac monitoring  3/1 Some ectopy overnight self limited and seems to be asymptomatic, reviewed electrolytes WNL    Fracture closed, thyroid cartilage, initial encounter ([facility])- (present on admission)  Assessment & Plan  CT at [place] showing mildly displaced fracture of the left para midline aspect of the thyroid cartilage.  Bronchoscopy in ICU without evidence of tracheobronchial injury distal to the endotracheal tube however bloody sections noted proximally.  CT neck with thyroid cartilage fracture & suspect injury to cricoid cartilage.  3/2 Tracheostomy placement. Direct laryngoscopy and tracheoscopy.  Flaco GRULLON  MD Ben. [facility].    Pneumomediastinum (HCC)- (present on admission)  Assessment & Plan  Extensive gas is noted within the superficial and deep soft tissue planes of the upper chest and neck.  Opacification of a 4 cm segment of the trachea, distal to the cords, surrounding the endotracheal tube is present, and could represent a manifestation of tracheal injury.  CT neck with subcutaneous emphysema & pneumomediastinum.  ENT and GI consulted for concern for tracheal/esophageal injuries.  Flaco Contreras MD. [facility].  Don Armstrong MD. [facility].    Pneumothorax on left- (present on admission)  Assessment & Plan  Left chest tube placed by [place].  To continuous suction  Serial chest X Ray's   2/29 Water seal chest tube.  3/1 Will keep chest tube until definitive management of airway  3/2 CXR in AM, if trace pneumothorax is stable, remove chest tube    Respiratory failure following trauma (HCC)- (present on admission)  Assessment & Plan  Intubated at [place].  3/1 Endoscopic evaluation of airway and trach placement today with ENT  3/2  Switch to trach collar, tolerating    Tracheostomy to be managed by ENT.  Not to be decannulated this admission.  Can follow up with ENT as an outpatient for decannulation when appropriate    Elevated troponin- (present on admission)  Assessment & Plan  Admission troponin level 354.  EKG with SR without acute changes.  Otis Ni MD, Cardiology    History of chronic CHF- (present on admission)  Assessment & Plan  Per chart review.   History of dilated cardiomyopathy  2/28 Echocardiogram with LVEF 50%.    Diabetes (HCC)- (present on admission)  Assessment & Plan  Chronic condition treated with Insulin and metformin.  Holding maintenance metformin for 48 hours following intravenous contrast administration.  Insulin sliding scale coverage.  3/1 15 u lantus resumed.   3/2 lantus increase to 25 u    A-fib (HCC)- (present on  admission)  Assessment & Plan  Chronic condition treated with Xarelto and Toprol Xl  EKG sinus rhythm.  Toprol XL resumed on admit.  Systemic anticoagulation held on admission.    Fever  Assessment & Plan  Temperature 100-101 F, responding to tylenol  Signficant secretion burden, likely from this, no clearly purulent, normal wbc count  Continue to monitor and treat with tylenol for now    No contraindication to deep vein thrombosis (DVT) prophylaxis- (present on admission)  Assessment & Plan  2/29 Prophylactic dose enoxaparin 40 mg BID initiated.     Trauma- (present on admission)  Assessment & Plan  Walked into gas station stated he didn't feel well then had syncopal episode.  Trauma Red Transfer Activation from [facility] in [place], [place].  Taz Ward MD. Trauma Surgery.    High cholesterol- (present on admission)  Assessment & Plan  Chronic condition treated with Crestor  Resumed maintenance medication on admission.        I independently reviewed pertinent clinical lab tests since admission and ordered additional follow up clinical lab tests.  I independently reviewed pertinent radiographic images and the radiologist's reports since admission and ordered additional follow up radiographic studies.  The details of the available patient records in [facility] (including laboratory tests, culture data, medications, imaging, and other pertinent diagnostic tests) and documentation by consulting physicians (when applicable) were reviewed, summated, and that information was utilized as warranted in today's medical decision making for this patient.  I personally evaluated the patient condition at bedside, discussed the daily plan(s) with available nursing staff, dieticians, social workers, pharmacists on multi-disciplinary rounds, and performed frequent reassessments through out the day as clinically warranted.  I evaluated the patient's condition at bedside.     The patient remains critically ill with  acute respiratory failure     He is requiring Review of interval medical and surgical history, current medications and outpatient medication reconciliation, interval imaging studies and radiologist interpretation, and interval laboratory values.  Management of respiratory failure and laryngeal/tracheal injury.  Management of atrial fibrillation with likely cardiogenic syncopal episode  Multidisciplinary evaluation and management of complex post acute care discharge issues and integration of multiple data points and associated complex medical decision making, active ventilator management, management of cardiac arrhythmias, and management of thrombotic surveillance and risk mitigation involving high complexity decision making and medically necessary care by providing frequent assessment, manipulation, and support of cardiac function and pulmonary function to prevent further life-threatening deterioration of the patient's condition.      This patient requires continued ICU management and hospital admission.  The patient has impairment of one or more vital organ systems and a high probability of imminent or life-threatening deterioration in condition.      Aggregated critical care time spent evaluating, reassessing, reviewing documentation, providing care, and managing this patient exclusive of procedures: 32 minutes     Migue Dawson MD

## 2024-03-02 NOTE — CARE PLAN
Problem: Ventilation  Goal: Ability to achieve and maintain unassisted ventilation or tolerate decreased levels of ventilator support  Description: Target End Date:  4 days     Document on Vent flowsheet    1.  Support and monitor invasive and noninvasive mechanical ventilation  2.  Monitor ventilator weaning response  3.  Perform ventilator associated pneumonia prevention interventions  4.  Manage ventilation therapy by monitoring diagnostic test results  Outcome: Progressing     Ventilator Daily Summary    Vent Day # 5  Airway: 8.0 portex trach day 2    Ventilator settings: /8/30    Plan: Continue current ventilator settings and wean mechanical ventilation as tolerated per physician orders.

## 2024-03-03 ENCOUNTER — APPOINTMENT (OUTPATIENT)
Dept: RADIOLOGY | Facility: MEDICAL CENTER | Age: 74
DRG: 004 | End: 2024-03-03
Attending: SURGERY
Payer: COMMERCIAL

## 2024-03-03 LAB
ALBUMIN SERPL BCP-MCNC: 2.8 G/DL (ref 3.2–4.9)
ALBUMIN/GLOB SERPL: 0.8 G/DL
ALP SERPL-CCNC: 89 U/L (ref 30–99)
ALT SERPL-CCNC: 82 U/L (ref 2–50)
ANION GAP SERPL CALC-SCNC: 10 MMOL/L (ref 7–16)
ANION GAP SERPL CALC-SCNC: 10 MMOL/L (ref 7–16)
APTT PPP: 38.9 SEC (ref 24.7–36)
AST SERPL-CCNC: 79 U/L (ref 12–45)
BASOPHILS # BLD AUTO: 0.6 % (ref 0–1.8)
BASOPHILS # BLD: 0.04 K/UL (ref 0–0.12)
BILIRUB SERPL-MCNC: 0.4 MG/DL (ref 0.1–1.5)
BUN SERPL-MCNC: 30 MG/DL (ref 8–22)
BUN SERPL-MCNC: 33 MG/DL (ref 8–22)
CALCIUM ALBUM COR SERPL-MCNC: 9.6 MG/DL (ref 8.5–10.5)
CALCIUM SERPL-MCNC: 8.6 MG/DL (ref 8.5–10.5)
CALCIUM SERPL-MCNC: 8.7 MG/DL (ref 8.5–10.5)
CHLORIDE SERPL-SCNC: 107 MMOL/L (ref 96–112)
CHLORIDE SERPL-SCNC: 108 MMOL/L (ref 96–112)
CO2 SERPL-SCNC: 25 MMOL/L (ref 20–33)
CO2 SERPL-SCNC: 25 MMOL/L (ref 20–33)
CREAT SERPL-MCNC: 0.73 MG/DL (ref 0.5–1.4)
CREAT SERPL-MCNC: 0.77 MG/DL (ref 0.5–1.4)
EKG IMPRESSION: NORMAL
EOSINOPHIL # BLD AUTO: 0.03 K/UL (ref 0–0.51)
EOSINOPHIL NFR BLD: 0.4 % (ref 0–6.9)
ERYTHROCYTE [DISTWIDTH] IN BLOOD BY AUTOMATED COUNT: 45.1 FL (ref 35.9–50)
GFR SERPLBLD CREATININE-BSD FMLA CKD-EPI: 93 ML/MIN/1.73 M 2
GFR SERPLBLD CREATININE-BSD FMLA CKD-EPI: 95 ML/MIN/1.73 M 2
GLOBULIN SER CALC-MCNC: 3.4 G/DL (ref 1.9–3.5)
GLUCOSE BLD STRIP.AUTO-MCNC: 147 MG/DL (ref 65–99)
GLUCOSE BLD STRIP.AUTO-MCNC: 159 MG/DL (ref 65–99)
GLUCOSE BLD STRIP.AUTO-MCNC: 174 MG/DL (ref 65–99)
GLUCOSE BLD STRIP.AUTO-MCNC: 175 MG/DL (ref 65–99)
GLUCOSE BLD STRIP.AUTO-MCNC: 188 MG/DL (ref 65–99)
GLUCOSE BLD STRIP.AUTO-MCNC: 198 MG/DL (ref 65–99)
GLUCOSE BLD STRIP.AUTO-MCNC: 200 MG/DL (ref 65–99)
GLUCOSE BLD STRIP.AUTO-MCNC: 209 MG/DL (ref 65–99)
GLUCOSE BLD STRIP.AUTO-MCNC: 213 MG/DL (ref 65–99)
GLUCOSE BLD STRIP.AUTO-MCNC: 268 MG/DL (ref 65–99)
GLUCOSE BLD STRIP.AUTO-MCNC: 270 MG/DL (ref 65–99)
GLUCOSE BLD STRIP.AUTO-MCNC: 295 MG/DL (ref 65–99)
GLUCOSE BLD STRIP.AUTO-MCNC: 306 MG/DL (ref 65–99)
GLUCOSE BLD STRIP.AUTO-MCNC: 318 MG/DL (ref 65–99)
GLUCOSE BLD STRIP.AUTO-MCNC: 396 MG/DL (ref 65–99)
GLUCOSE SERPL-MCNC: 186 MG/DL (ref 65–99)
GLUCOSE SERPL-MCNC: 373 MG/DL (ref 65–99)
HCT VFR BLD AUTO: 34.3 % (ref 42–52)
HGB BLD-MCNC: 11.4 G/DL (ref 14–18)
IMM GRANULOCYTES # BLD AUTO: 0.02 K/UL (ref 0–0.11)
IMM GRANULOCYTES NFR BLD AUTO: 0.3 % (ref 0–0.9)
INR PPP: 1.16 (ref 0.87–1.13)
LYMPHOCYTES # BLD AUTO: 0.75 K/UL (ref 1–4.8)
LYMPHOCYTES NFR BLD: 11 % (ref 22–41)
MCH RBC QN AUTO: 31.1 PG (ref 27–33)
MCHC RBC AUTO-ENTMCNC: 33.2 G/DL (ref 32.3–36.5)
MCV RBC AUTO: 93.5 FL (ref 81.4–97.8)
MONOCYTES # BLD AUTO: 0.95 K/UL (ref 0–0.85)
MONOCYTES NFR BLD AUTO: 13.9 % (ref 0–13.4)
NEUTROPHILS # BLD AUTO: 5.05 K/UL (ref 1.82–7.42)
NEUTROPHILS NFR BLD: 73.8 % (ref 44–72)
NRBC # BLD AUTO: 0 K/UL
NRBC BLD-RTO: 0 /100 WBC (ref 0–0.2)
PLATELET # BLD AUTO: 194 K/UL (ref 164–446)
PMV BLD AUTO: 9.7 FL (ref 9–12.9)
POTASSIUM SERPL-SCNC: 4.2 MMOL/L (ref 3.6–5.5)
POTASSIUM SERPL-SCNC: 4.4 MMOL/L (ref 3.6–5.5)
POTASSIUM SERPL-SCNC: 4.6 MMOL/L (ref 3.6–5.5)
PROT SERPL-MCNC: 6.2 G/DL (ref 6–8.2)
PROTHROMBIN TIME: 14.9 SEC (ref 12–14.6)
RBC # BLD AUTO: 3.67 M/UL (ref 4.7–6.1)
SODIUM SERPL-SCNC: 142 MMOL/L (ref 135–145)
SODIUM SERPL-SCNC: 143 MMOL/L (ref 135–145)
TRIGL SERPL-MCNC: 112 MG/DL (ref 0–149)
UFH PPP CHRO-ACNC: 0.17 IU/ML
UFH PPP CHRO-ACNC: <0.1 IU/ML
WBC # BLD AUTO: 6.8 K/UL (ref 4.8–10.8)

## 2024-03-03 PROCEDURE — A9270 NON-COVERED ITEM OR SERVICE: HCPCS | Performed by: SURGERY

## 2024-03-03 PROCEDURE — 85610 PROTHROMBIN TIME: CPT

## 2024-03-03 PROCEDURE — 700111 HCHG RX REV CODE 636 W/ 250 OVERRIDE (IP): Mod: JZ

## 2024-03-03 PROCEDURE — 84478 ASSAY OF TRIGLYCERIDES: CPT

## 2024-03-03 PROCEDURE — 700102 HCHG RX REV CODE 250 W/ 637 OVERRIDE(OP): Performed by: STUDENT IN AN ORGANIZED HEALTH CARE EDUCATION/TRAINING PROGRAM

## 2024-03-03 PROCEDURE — A9270 NON-COVERED ITEM OR SERVICE: HCPCS | Performed by: STUDENT IN AN ORGANIZED HEALTH CARE EDUCATION/TRAINING PROGRAM

## 2024-03-03 PROCEDURE — 700102 HCHG RX REV CODE 250 W/ 637 OVERRIDE(OP): Performed by: SURGERY

## 2024-03-03 PROCEDURE — 85025 COMPLETE CBC W/AUTO DIFF WBC: CPT

## 2024-03-03 PROCEDURE — 700111 HCHG RX REV CODE 636 W/ 250 OVERRIDE (IP): Performed by: STUDENT IN AN ORGANIZED HEALTH CARE EDUCATION/TRAINING PROGRAM

## 2024-03-03 PROCEDURE — 99231 SBSQ HOSP IP/OBS SF/LOW 25: CPT | Mod: FS | Performed by: INTERNAL MEDICINE

## 2024-03-03 PROCEDURE — A9270 NON-COVERED ITEM OR SERVICE: HCPCS

## 2024-03-03 PROCEDURE — 84132 ASSAY OF SERUM POTASSIUM: CPT

## 2024-03-03 PROCEDURE — 700101 HCHG RX REV CODE 250: Performed by: STUDENT IN AN ORGANIZED HEALTH CARE EDUCATION/TRAINING PROGRAM

## 2024-03-03 PROCEDURE — 85730 THROMBOPLASTIN TIME PARTIAL: CPT

## 2024-03-03 PROCEDURE — 94640 AIRWAY INHALATION TREATMENT: CPT

## 2024-03-03 PROCEDURE — 85520 HEPARIN ASSAY: CPT | Mod: 91

## 2024-03-03 PROCEDURE — 700105 HCHG RX REV CODE 258: Performed by: STUDENT IN AN ORGANIZED HEALTH CARE EDUCATION/TRAINING PROGRAM

## 2024-03-03 PROCEDURE — 80048 BASIC METABOLIC PNL TOTAL CA: CPT

## 2024-03-03 PROCEDURE — 770022 HCHG ROOM/CARE - ICU (200)

## 2024-03-03 PROCEDURE — 80053 COMPREHEN METABOLIC PANEL: CPT

## 2024-03-03 PROCEDURE — A9270 NON-COVERED ITEM OR SERVICE: HCPCS | Performed by: INTERNAL MEDICINE

## 2024-03-03 PROCEDURE — A9270 NON-COVERED ITEM OR SERVICE: HCPCS | Performed by: NURSE PRACTITIONER

## 2024-03-03 PROCEDURE — 700102 HCHG RX REV CODE 250 W/ 637 OVERRIDE(OP)

## 2024-03-03 PROCEDURE — 99291 CRITICAL CARE FIRST HOUR: CPT | Performed by: STUDENT IN AN ORGANIZED HEALTH CARE EDUCATION/TRAINING PROGRAM

## 2024-03-03 PROCEDURE — 82962 GLUCOSE BLOOD TEST: CPT | Mod: 91

## 2024-03-03 PROCEDURE — 700102 HCHG RX REV CODE 250 W/ 637 OVERRIDE(OP): Performed by: INTERNAL MEDICINE

## 2024-03-03 PROCEDURE — 700102 HCHG RX REV CODE 250 W/ 637 OVERRIDE(OP): Performed by: NURSE PRACTITIONER

## 2024-03-03 PROCEDURE — 71045 X-RAY EXAM CHEST 1 VIEW: CPT

## 2024-03-03 PROCEDURE — 93005 ELECTROCARDIOGRAM TRACING: CPT | Performed by: SURGERY

## 2024-03-03 RX ORDER — HEPARIN SODIUM 5000 [USP'U]/100ML
0-30 INJECTION, SOLUTION INTRAVENOUS CONTINUOUS
Status: DISPENSED | OUTPATIENT
Start: 2024-03-03 | End: 2024-03-06

## 2024-03-03 RX ORDER — LANSOPRAZOLE 30 MG/1
30 TABLET, ORALLY DISINTEGRATING, DELAYED RELEASE ORAL DAILY
Status: DISCONTINUED | OUTPATIENT
Start: 2024-03-03 | End: 2024-03-12 | Stop reason: HOSPADM

## 2024-03-03 RX ORDER — ASPIRIN 81 MG/1
81 TABLET, CHEWABLE ORAL DAILY
Status: DISCONTINUED | OUTPATIENT
Start: 2024-03-03 | End: 2024-03-11

## 2024-03-03 RX ORDER — POTASSIUM CHLORIDE 7.45 MG/ML
10 INJECTION INTRAVENOUS ONCE
Status: COMPLETED | OUTPATIENT
Start: 2024-03-03 | End: 2024-03-03

## 2024-03-03 RX ORDER — DEXTROSE MONOHYDRATE 25 G/50ML
12.5-25 INJECTION, SOLUTION INTRAVENOUS PRN
Status: DISCONTINUED | OUTPATIENT
Start: 2024-03-03 | End: 2024-03-05

## 2024-03-03 RX ORDER — ASPIRIN 81 MG/1
81 TABLET ORAL DAILY
Status: DISCONTINUED | OUTPATIENT
Start: 2024-03-03 | End: 2024-03-03

## 2024-03-03 RX ADMIN — TERAZOSIN HYDROCHLORIDE 2 MG: 2 CAPSULE ORAL at 17:50

## 2024-03-03 RX ADMIN — METOPROLOL TARTRATE 25 MG: 25 TABLET, FILM COATED ORAL at 17:49

## 2024-03-03 RX ADMIN — MAGNESIUM HYDROXIDE 30 ML: 1200 LIQUID ORAL at 05:53

## 2024-03-03 RX ADMIN — LISINOPRIL 5 MG: 5 TABLET ORAL at 05:54

## 2024-03-03 RX ADMIN — ASPIRIN 81 MG 81 MG: 81 TABLET ORAL at 11:53

## 2024-03-03 RX ADMIN — GLYCOPYRROLATE 1 MG: 1 TABLET ORAL at 12:26

## 2024-03-03 RX ADMIN — GABAPENTIN 300 MG: 300 CAPSULE ORAL at 17:49

## 2024-03-03 RX ADMIN — POTASSIUM CHLORIDE 10 MEQ: 7.46 INJECTION, SOLUTION INTRAVENOUS at 13:06

## 2024-03-03 RX ADMIN — INSULIN HUMAN 10 UNITS: 100 INJECTION, SOLUTION PARENTERAL at 00:45

## 2024-03-03 RX ADMIN — DEXMEDETOMIDINE HYDROCHLORIDE 0.8 MCG/KG/HR: 100 INJECTION, SOLUTION INTRAVENOUS at 02:06

## 2024-03-03 RX ADMIN — DOCUSATE SODIUM 100 MG: 50 LIQUID ORAL at 17:49

## 2024-03-03 RX ADMIN — GLYCOPYRROLATE 1 MG: 1 TABLET ORAL at 17:50

## 2024-03-03 RX ADMIN — LANSOPRAZOLE 30 MG: 30 TABLET, ORALLY DISINTEGRATING, DELAYED RELEASE ORAL at 10:07

## 2024-03-03 RX ADMIN — DEXMEDETOMIDINE HYDROCHLORIDE 0.8 MCG/KG/HR: 100 INJECTION, SOLUTION INTRAVENOUS at 15:28

## 2024-03-03 RX ADMIN — METOPROLOL TARTRATE 25 MG: 25 TABLET, FILM COATED ORAL at 05:53

## 2024-03-03 RX ADMIN — GABAPENTIN 300 MG: 300 CAPSULE ORAL at 05:54

## 2024-03-03 RX ADMIN — POLYETHYLENE GLYCOL 3350 1 PACKET: 17 POWDER, FOR SOLUTION ORAL at 05:53

## 2024-03-03 RX ADMIN — DOCUSATE SODIUM 100 MG: 50 LIQUID ORAL at 05:53

## 2024-03-03 RX ADMIN — GLYCOPYRROLATE 1 MG: 1 TABLET ORAL at 05:54

## 2024-03-03 RX ADMIN — DOCUSATE SODIUM 50 MG AND SENNOSIDES 8.6 MG 1 TABLET: 8.6; 5 TABLET, FILM COATED ORAL at 21:09

## 2024-03-03 RX ADMIN — SODIUM CHLORIDE 4 UNITS/HR: 9 INJECTION, SOLUTION INTRAVENOUS at 09:55

## 2024-03-03 RX ADMIN — POLYETHYLENE GLYCOL 3350 1 PACKET: 17 POWDER, FOR SOLUTION ORAL at 17:49

## 2024-03-03 RX ADMIN — LEVOTHYROXINE SODIUM 75 MCG: 0.07 TABLET ORAL at 05:53

## 2024-03-03 RX ADMIN — ROSUVASTATIN CALCIUM 20 MG: 20 TABLET, FILM COATED ORAL at 05:54

## 2024-03-03 RX ADMIN — OXYCODONE HYDROCHLORIDE 10 MG: 10 TABLET ORAL at 02:12

## 2024-03-03 RX ADMIN — HEPARIN SODIUM 18 UNITS/KG/HR: 5000 INJECTION, SOLUTION INTRAVENOUS at 11:53

## 2024-03-03 RX ADMIN — INSULIN HUMAN 12 UNITS: 100 INJECTION, SOLUTION PARENTERAL at 05:56

## 2024-03-03 RX ADMIN — DEXMEDETOMIDINE HYDROCHLORIDE 1 MCG/KG/HR: 100 INJECTION, SOLUTION INTRAVENOUS at 21:09

## 2024-03-03 RX ADMIN — DEXMEDETOMIDINE HYDROCHLORIDE 0.8 MCG/KG/HR: 100 INJECTION, SOLUTION INTRAVENOUS at 09:14

## 2024-03-03 RX ADMIN — ENOXAPARIN SODIUM 40 MG: 100 INJECTION SUBCUTANEOUS at 05:53

## 2024-03-03 ASSESSMENT — FIBROSIS 4 INDEX: FIB4 SCORE: 3.37

## 2024-03-03 ASSESSMENT — PAIN DESCRIPTION - PAIN TYPE
TYPE: ACUTE PAIN

## 2024-03-03 NOTE — PROGRESS NOTES
Cardiology Follow Up Progress Note    Date of Service  3/3/2024    Attending Physician  Taz Ward M.D.    Chief Complaint     Cardiology consulted for evaluation of elevated troponin ~350    HPI  Shaheen Peguero is a 74 y.o. male admitted 2/27/2024 with syncopal episode with subsequent trauma (tracheal/cricoid cartilage fracture with pneumothoraces, unclear if related to intubation outside facility).    PMH: PCI mLAD 6/7/2021 ([facility]) ischemic cardiomyopathy recovered as of 2022, type 2 diabetes, dyslipidemia, PAF on Xarelto, thyroid disorder, provoked DVT      Interim Events      No overnight cardiac events  Telemetry-SR  No evidence of atrial or ventricular arrhythmia.  Underwent tracheostomy tube placement on 3/1/2024.  Currently on T-piece 10 L / 40%.  Plan to remove chest tube 3/3/2024.  Bloody brownish secretion via trach, will hold off resuming Xarelto.      Review of Systems  Review of Systems   Unable to perform ROS: Intubated       Vital signs in last 24 hours  Temp:  [36.5 °C (97.7 °F)-38.1 °C (100.6 °F)] 37.9 °C (100.2 °F)  Pulse:  [56-90] 56  Resp:  [18-38] 25  BP: (119-192)/(56-82) 134/65  SpO2:  [91 %-98 %] 96 %    Physical Exam  Physical Exam  Constitutional:       Comments: Intubated, sedated   Cardiovascular:      Rate and Rhythm: Normal rate and regular rhythm.      Pulses: Normal pulses.   Pulmonary:      Comments: intubated  Abdominal:      Comments: NG tube   Genitourinary:     Comments: Brooke  Skin:     General: Skin is warm.         Lab Review  Lab Results   Component Value Date/Time    WBC 6.8 03/03/2024 04:55 AM    RBC 3.67 (L) 03/03/2024 04:55 AM    HEMOGLOBIN 11.4 (L) 03/03/2024 04:55 AM    HEMATOCRIT 34.3 (L) 03/03/2024 04:55 AM    MCV 93.5 03/03/2024 04:55 AM    MCH 31.1 03/03/2024 04:55 AM    MCHC 33.2 03/03/2024 04:55 AM    MPV 9.7 03/03/2024 04:55 AM      Lab Results   Component Value Date/Time    SODIUM 142 03/03/2024 04:55 AM    POTASSIUM 4.6 03/03/2024 04:55 AM  "   CHLORIDE 107 03/03/2024 04:55 AM    CO2 25 03/03/2024 04:55 AM    GLUCOSE 373 (H) 03/03/2024 04:55 AM    BUN 30 (H) 03/03/2024 04:55 AM    CREATININE 0.73 03/03/2024 04:55 AM      Lab Results   Component Value Date/Time    ASTSGOT 79 (H) 03/03/2024 04:55 AM    ALTSGPT 82 (H) 03/03/2024 04:55 AM     Lab Results   Component Value Date/Time    CHOLSTRLTOT 103 02/28/2024 05:20 AM    LDL 47 02/28/2024 05:20 AM    HDL 32 (A) 02/28/2024 05:20 AM    TRIGLYCERIDE 112 03/03/2024 04:55 AM    TROPONINT 86 (H) 02/28/2024 11:40 AM       No results for input(s): \"NTPROBNP\" in the last 72 hours.      Cardiac Imaging and Procedures Review  EKG: SR, PVCs, left anterior fascicular block, no ischemic changes.      Echocardiogram:    2/28/24  The left ventricle may be mildly dilated with grossly normal wall   thickness.  Grossly normal left ventricular systolic function.  Left ventricular systolic function appears low normal to mildly   reduced.  The left ventricular ejection fraction is roughly estimated to be 45-  50%, however, the accuracy of this estimate is limited.  There is questionable hypokinesis of the septum.  Unable to accurately estimate diastolic function.  The right ventricle is not well-visualized, but appears grossly normal   in size and systolic function.  Significant biatrial dilation.  Grossly normal valvular function.  Normal pericardium without effusion.          Cardiac Catheterization:    [facility]  6/7/2021  Angiography left coronary artery reveals that the left main segment has no significant disease.  The circumflex that is Marginal Branches Have No Significant Disease.  In the left anterior descending artery, there is a proximal 30% stenosis, there is a mid eccentric 90% stenosis.  Diagonal branches have no significant disease.  RCA is dominant, no significant disease.  Final result: Successful stenting of the mid LAD with 3.0 x 15 mm Biotronik drug-eluting stents.    Imaging  Chest X-Ray:     No " definite pneumomediastinum. No pneumothorax.     2. Neck soft tissue emphysema is redemonstrated.           Stress Test:   5/20/2021  [facility]  Mild reversible perfusion in the inferior wall and inferior aspect of the lateral wall suggesting ischemia.  Septum and apex are akinetic.  Anterior, lateral and inferior walls are hypokinetic.      Assessment/Plan    # Syncope and collapse.  # EKG nonrevealing, no ischemic changes.  # Elevated troponin ~300, trending down.  # Echocardiogram, technically limited study LVEF 45 to 50%.  Questionable hypokinesis of the septum.  # PCI mid LAD 6/7/2021 ([facility]).  # PAF, on Xarelto, currently on hold pending tracheostomy.  # A1c 8, [facility]SI inpatient  # Respiratory failure with trauma, intubated at [place], remains intubated.  # Thyroid cartilage fracture.  # L Pneumothorax s/p Chest tube       -Continue telemetry monitoring.  -Plan for ischemic workup when clinically stable likely prior to discharge.      Cardiology will follow along    I personally spent a total of 15 minutes which includes face-to-face time and non-face-to-face time spent on preparing to see the patient, reviewing hospital notes and tests, obtaining history from the patient, performing a medically appropriate exam, counseling and educating the patient, ordering medications/tests/procedures/referrals as clinically indicated, and documenting information in the electronic medical record.         Thank you for allowing me to participate in the care of this patient.  I will continue to follow this patient    Please contact me with any questions.    KENNEDY Cunha.   Cardiologist, [facility]  [phone]

## 2024-03-03 NOTE — CARE PLAN
The patient is Watcher - Medium risk of patient condition declining or worsening    Shift Goals  Clinical Goals: Pulmonary Hygiene, patient comfort  Patient Goals: VIANNEY  Family Goals: VIANNEY    Progress made toward(s) clinical / shift goals:    Problem: Knowledge Deficit - Standard  Goal: Patient and family/care givers will demonstrate understanding of plan of care, disease process/condition, diagnostic tests and medications  Outcome: Progressing     Problem: Safety - Medical Restraint  Goal: Remains free of injury from restraints (Restraint for Interference with Medical Device)  Outcome: Progressing     Problem: Pain - Standard  Goal: Alleviation of pain or a reduction in pain to the patient’s comfort goal  Outcome: Progressing     Problem: Respiratory  Goal: Patient will achieve/maintain optimum respiratory ventilation and gas exchange  Outcome: Progressing       Patient is not progressing towards the following goals:      Problem: Safety - Medical Restraint  Goal: Free from restraint(s) (Restraint for Interference with Medical Device)  Outcome: Not Progressing

## 2024-03-03 NOTE — CARE PLAN
Problem: Aerosol Therapy  Goal: Improved hydration/ability to mobilize secretions and/or decreased airway edema  Description: Target End Date:  resolve prior to discharge or when underlying condition is resolved/stabilized    1.  Implement heated or cool aerosol therapy  2.  Assessed for optimal hydration, decreased edema and/or improved ability to mobilize secretions  Outcome: Progressing     T-piece 10L 40%

## 2024-03-03 NOTE — CARE PLAN
The patient is Watcher - Medium risk of patient condition declining or worsening    Shift Goals  Clinical Goals: Improved secretions, improved oxygenation  Patient Goals: VIANNEY  Family Goals: VIANNEY    Progress made toward(s) clinical / shift goals:      Problem: Safety - Medical Restraint  Goal: Remains free of injury from restraints (Restraint for Interference with Medical Device)  Outcome: Progressing     Problem: Pain - Standard  Goal: Alleviation of pain or a reduction in pain to the patient’s comfort goal  Outcome: Progressing     Patient is not progressing towards the following goals:  Patient continues to have large amounts of secretions and has issues with oxygenation saturation from tracheostomy, despite suctioning and good coughing technique.

## 2024-03-03 NOTE — PROGRESS NOTES
Trauma / Surgical Daily Progress Note    Date of Service  3/3/2024    Chief Complaint  74 y.o. male admitted 2/27/2024 with Trauma    Interval Events  Able to communicate with gestures but appears confused  Vitals WNL except low grade temps  Thick brown secretions, coughing frequently   Blood sugars still elevated despite increasing lantus      Vital Signs for last 24 hours  Temp:  [36.5 °C (97.7 °F)-38.1 °C (100.6 °F)] 37.2 °C (98.9 °F)  Pulse:  [56-90] 56  Resp:  [19-38] 25  BP: (119-192)/(56-79) 134/65  SpO2:  [91 %-98 %] 96 %    Hemodynamic parameters for last 24 hours       Respiratory Data     Respiration: (!) 25, Pulse Oximetry: 96 %     Work Of Breathing / Effort: Mild;Tachypnea  RUL Breath Sounds: Coarse Crackles, RML Breath Sounds: Coarse Crackles, RLL Breath Sounds: Coarse Crackles, WINSTON Breath Sounds: Coarse Crackles, LLL Breath Sounds: Coarse Crackles    Physical Exam  Physical Exam  Vitals and nursing note reviewed.   Constitutional:       General: He is not in acute distress.     Interventions: He is sedated, intubated and restrained. Cervical collar in place.   HENT:      Head: Normocephalic and atraumatic.      Nose:      Comments: NG tube     Mouth/Throat:      Mouth: Mucous membranes are moist.      Pharynx: Oropharynx is clear.   Eyes:      Conjunctiva/sclera: Conjunctivae normal.      Pupils: Pupils are equal, round, and reactive to light.   Neck:      Comments: Tracheostomy in place, some bloody debris within and around trach insertion site.  No signs of active bleeding.  Subcutaneous emphysema still present, mild improvement  Cardiovascular:      Rate and Rhythm: Normal rate and regular rhythm.      Pulses: Normal pulses.   Pulmonary:      Effort: Pulmonary effort is normal. He is intubated.      Breath sounds: Normal breath sounds. No stridor.   Abdominal:      General: Abdomen is flat. There is no distension.      Palpations: Abdomen is soft.   Musculoskeletal:         General: Normal range  of motion.      Right lower leg: No edema.      Left lower leg: No edema.   Skin:     General: Skin is warm and dry.   Neurological:      General: No focal deficit present.      Mental Status: He is alert and easily aroused.   Psychiatric:         Behavior: Behavior is cooperative.         Laboratory  Recent Results (from the past 24 hour(s))   POCT glucose device results    Collection Time: 24 11:42 AM   Result Value Ref Range    POC Glucose, Blood 289 (H) 65 - 99 mg/dL   POCT glucose device results    Collection Time: 24  5:03 PM   Result Value Ref Range    POC Glucose, Blood 271 (H) 65 - 99 mg/dL   EKG    Collection Time: 24 12:17 AM   Result Value Ref Range    Report       [facility]    Test Date:  2024  Pt Name:    ROSY CALVIN             Department: [facility]  MRN:        [id number]                      Room:       [facility]  Gender:     Male                         Technician: KAYLIE  :        1950                   Requested By:YAZMIN CAMARENA  Order #:    [id number]                    Reading MD: Wyatt Lundberg MD    Measurements  Intervals                                Axis  Rate:       64                           P:          60  MA:         146                          QRS:        -26  QRSD:       104                          T:          25  QT:         421  QTc:        435    Interpretive Statements  Sinus rhythm  Atrial premature complex  Borderline left axis deviation  Abnormal R-wave progression, late transition  Compared to ECG 2024 09:06:30  Atrial premature complex(es) now present    Electronically Signed On 2024 08:47:12 PST by Wyatt Lundberg MD     POCT glucose device results    Collection Time: 24 12:44 AM   Result Value Ref Range    POC Glucose, Blood 318 (H) 65 - 99 mg/dL   CBC with Differential: Tomorrow AM    Collection Time: 24  4:55 AM   Result Value Ref Range    WBC 6.8 4.8 - 10.8 K/uL    RBC 3.67 (L) 4.70 - 6.10 M/uL    Hemoglobin  11.4 (L) 14.0 - 18.0 g/dL    Hematocrit 34.3 (L) 42.0 - 52.0 %    MCV 93.5 81.4 - 97.8 fL    MCH 31.1 27.0 - 33.0 pg    MCHC 33.2 32.3 - 36.5 g/dL    RDW 45.1 35.9 - 50.0 fL    Platelet Count 194 164 - 446 K/uL    MPV 9.7 9.0 - 12.9 fL    Neutrophils-Polys 73.80 (H) 44.00 - 72.00 %    Lymphocytes 11.00 (L) 22.00 - 41.00 %    Monocytes 13.90 (H) 0.00 - 13.40 %    Eosinophils 0.40 0.00 - 6.90 %    Basophils 0.60 0.00 - 1.80 %    Immature Granulocytes 0.30 0.00 - 0.90 %    Nucleated RBC 0.00 0.00 - 0.20 /100 WBC    Neutrophils (Absolute) 5.05 1.82 - 7.42 K/uL    Lymphs (Absolute) 0.75 (L) 1.00 - 4.80 K/uL    Monos (Absolute) 0.95 (H) 0.00 - 0.85 K/uL    Eos (Absolute) 0.03 0.00 - 0.51 K/uL    Baso (Absolute) 0.04 0.00 - 0.12 K/uL    Immature Granulocytes (abs) 0.02 0.00 - 0.11 K/uL    NRBC (Absolute) 0.00 K/uL   Comp Metabolic Panel (CMP): Tomorrow AM    Collection Time: 03/03/24  4:55 AM   Result Value Ref Range    Sodium 142 135 - 145 mmol/L    Potassium 4.6 3.6 - 5.5 mmol/L    Chloride 107 96 - 112 mmol/L    Co2 25 20 - 33 mmol/L    Anion Gap 10.0 7.0 - 16.0    Glucose 373 (H) 65 - 99 mg/dL    Bun 30 (H) 8 - 22 mg/dL    Creatinine 0.73 0.50 - 1.40 mg/dL    Calcium 8.6 8.5 - 10.5 mg/dL    Correct Calcium 9.6 8.5 - 10.5 mg/dL    AST(SGOT) 79 (H) 12 - 45 U/L    ALT(SGPT) 82 (H) 2 - 50 U/L    Alkaline Phosphatase 89 30 - 99 U/L    Total Bilirubin 0.4 0.1 - 1.5 mg/dL    Albumin 2.8 (L) 3.2 - 4.9 g/dL    Total Protein 6.2 6.0 - 8.2 g/dL    Globulin 3.4 1.9 - 3.5 g/dL    A-G Ratio 0.8 g/dL   Triglyceride    Collection Time: 03/03/24  4:55 AM   Result Value Ref Range    Triglycerides 112 0 - 149 mg/dL   ESTIMATED GFR    Collection Time: 03/03/24  4:55 AM   Result Value Ref Range    GFR (CKD-EPI) 95 >60 mL/min/1.73 m 2       Fluids    Intake/Output Summary (Last 24 hours) at 3/3/2024 1028  Last data filed at 3/3/2024 0800  Gross per 24 hour   Intake 1684.25 ml   Output 1370 ml   Net 314.25 ml       Core Measures & Quality  Metrics  EKG reviewed, Labs reviewed and Medications reviewed  Brooke catheter: No Brooke      DVT Prophylaxis: Enoxaparin (Lovenox)  DVT prophylaxis - mechanical: SCDs  Ulcer prophylaxis: Not indicated    Assessed for rehab: Patient unable to tolerate rehabilitation therapeutic regimen    RAP Score Total: 10    CAGE Results: not completed Blood Alcohol>0.08: no       Assessment/Plan  Syncope and collapse- (present on admission)  Assessment & Plan  EKG sinus rhythm & left anterior fascicular block.  Trend troponin.   Echocardiogram with LVEF 50%.  Continuous cardiac monitoring  3/1 Some ectopy overnight self limited and seems to be asymptomatic, reviewed electrolytes WNL    Fracture closed, thyroid cartilage, initial encounter (HCC)- (present on admission)  Assessment & Plan  CT at [place] showing mildly displaced fracture of the left para midline aspect of the thyroid cartilage.  Bronchoscopy in ICU without evidence of tracheobronchial injury distal to the endotracheal tube however bloody sections noted proximally.  CT neck with thyroid cartilage fracture & suspect injury to cricoid cartilage.  3/2 Tracheostomy placement. Direct laryngoscopy and tracheoscopy.  Flaco Contreras MD. [facility].    Pneumomediastinum (HCC)- (present on admission)  Assessment & Plan  Extensive gas is noted within the superficial and deep soft tissue planes of the upper chest and neck.  Opacification of a 4 cm segment of the trachea, distal to the cords, surrounding the endotracheal tube is present, and could represent a manifestation of tracheal injury.  CT neck with subcutaneous emphysema & pneumomediastinum.  ENT and GI consulted for concern for tracheal/esophageal injuries.  Flaco Contreras MD. [facility].  Don Armstrong MD. [facility].    Pneumothorax on left- (present on admission)  Assessment & Plan  Left chest tube placed by Becerra.  2/29 Water seal chest tube.  3/1 Will  keep chest tube until definitive management of airway  3/2 CXR in AM, if trace pneumothorax is stable, remove chest tube  3/3 CXR without pneumo, chest tube removal.  Serial CXRs    Respiratory failure following trauma (HCC)- (present on admission)  Assessment & Plan  Intubated at [place].  3/1 Endoscopic evaluation of airway and trach placement today with ENT  3/2  Switch to trach collar, tolerating  3/3 Tolerating t piece.    Tracheostomy to be managed by ENT.  Not to be decannulated this admission.  Can follow up with ENT as an outpatient for decannulation when appropriate    Fever  Assessment & Plan  Temperature 100-101 F, responding to tylenol  Signficant secretion burden, likely from this, no clearly purulent, normal wbc count  Continue to monitor and treat with tylenol for now    Elevated troponin- (present on admission)  Assessment & Plan  Admission troponin level 354.  EKG with SR without acute changes.  3/3 Cardiology recommending Heparin gtt, ASA 81, and Xarelto when feasible.  Secretions from trach are no longer bloody.  Heparin gtt and ASA started today  Otis MD Last, Cardiology    History of chronic CHF- (present on admission)  Assessment & Plan  Per chart review.   History of dilated cardiomyopathy  2/28 Echocardiogram with LVEF 50%.    Diabetes (HCC)- (present on admission)  Assessment & Plan  Chronic condition treated with Insulin and metformin.  Holding maintenance metformin for 48 hours following intravenous contrast administration.  Insulin sliding scale coverage.  3/1 15 u lantus resumed.   3/2 lantus increase to 25 u  3/3 Start insulin gtt    A-fib (HCC)- (present on admission)  Assessment & Plan  Chronic condition treated with Xarelto and Toprol Xl  EKG sinus rhythm.  Toprol XL resumed on admit.  Systemic anticoagulation held on admission.    No contraindication to deep vein thrombosis (DVT) prophylaxis- (present on admission)  Assessment & Plan  2/29 Prophylactic dose enoxaparin 40 mg BID  initiated.     Trauma- (present on admission)  Assessment & Plan  Walked into gas station stated he didn't feel well then had syncopal episode.  Trauma Red Transfer Activation from [facility] in [place], [place].  Taz Ward MD. Trauma Surgery.    High cholesterol- (present on admission)  Assessment & Plan  Chronic condition treated with Crestor  Resumed maintenance medication on admission.        I independently reviewed pertinent clinical lab tests since admission and ordered additional follow up clinical lab tests.  I independently reviewed pertinent radiographic images and the radiologist's reports since admission and ordered additional follow up radiographic studies.  The details of the available patient records in [facility] (including laboratory tests, culture data, medications, imaging, and other pertinent diagnostic tests) and documentation by consulting physicians (when applicable) were reviewed, summated, and that information was utilized as warranted in today's medical decision making for this patient.  I personally evaluated the patient condition at bedside, discussed the daily plan(s) with available nursing staff, dieticians, social workers, pharmacists on multi-disciplinary rounds, and performed frequent reassessments through out the day as clinically warranted.  I evaluated the patient's condition at bedside.     The patient remains critically ill with acute respiratory failure     He is requiring Review of interval medical and surgical history, current medications and outpatient medication reconciliation, interval imaging studies and radiologist interpretation, and interval laboratory values.  Management of respiratory failure and laryngeal/tracheal injury.  Management of atrial fibrillation with likely cardiogenic syncopal episode  Multidisciplinary evaluation and management of complex post acute care discharge issues and integration of multiple data points and associated complex  medical decision making, active ventilator management, management of cardiac arrhythmias, and management of thrombotic surveillance and risk mitigation involving high complexity decision making and medically necessary care by providing frequent assessment, manipulation, and support of cardiac function and pulmonary function to prevent further life-threatening deterioration of the patient's condition.      This patient requires continued ICU management and hospital admission.  The patient has impairment of one or more vital organ systems and a high probability of imminent or life-threatening deterioration in condition.      Aggregated critical care time spent evaluating, reassessing, reviewing documentation, providing care, and managing this patient exclusive of procedures: 35 minutes     Migue Dawson MD

## 2024-03-03 NOTE — CARE PLAN
Problem: Aerosol Therapy  Goal: Improved hydration/ability to mobilize secretions and/or decreased airway edema  Description: Target End Date:  resolve prior to discharge or when underlying condition is resolved/stabilized    1.  Implement heated or cool aerosol therapy  2.  Assessed for optimal hydration, decreased edema and/or improved ability to mobilize secretions  Outcome: Progressing   Tpiece 10L/40%, been on tpiece since 1028 of 3/2    Tolerating tpiece, secretion management frequent suctioning thick brown secretions

## 2024-03-04 ENCOUNTER — APPOINTMENT (OUTPATIENT)
Dept: RADIOLOGY | Facility: MEDICAL CENTER | Age: 74
DRG: 004 | End: 2024-03-04
Attending: SURGERY
Payer: COMMERCIAL

## 2024-03-04 LAB
ALBUMIN SERPL BCP-MCNC: 3 G/DL (ref 3.2–4.9)
ALBUMIN SERPL BCP-MCNC: 3 G/DL (ref 3.2–4.9)
ALBUMIN/GLOB SERPL: 1 G/DL
ALBUMIN/GLOB SERPL: 1 G/DL
ALP SERPL-CCNC: 124 U/L (ref 30–99)
ALP SERPL-CCNC: 99 U/L (ref 30–99)
ALT SERPL-CCNC: 127 U/L (ref 2–50)
ALT SERPL-CCNC: 270 U/L (ref 2–50)
ANION GAP SERPL CALC-SCNC: 10 MMOL/L (ref 7–16)
ANION GAP SERPL CALC-SCNC: 8 MMOL/L (ref 7–16)
AST SERPL-CCNC: 159 U/L (ref 12–45)
AST SERPL-CCNC: 449 U/L (ref 12–45)
BASOPHILS # BLD AUTO: 0.4 % (ref 0–1.8)
BASOPHILS # BLD: 0.03 K/UL (ref 0–0.12)
BILIRUB SERPL-MCNC: 0.4 MG/DL (ref 0.1–1.5)
BILIRUB SERPL-MCNC: 0.4 MG/DL (ref 0.1–1.5)
BUN SERPL-MCNC: 32 MG/DL (ref 8–22)
BUN SERPL-MCNC: 35 MG/DL (ref 8–22)
CALCIUM ALBUM COR SERPL-MCNC: 9.4 MG/DL (ref 8.5–10.5)
CALCIUM ALBUM COR SERPL-MCNC: 9.4 MG/DL (ref 8.5–10.5)
CALCIUM SERPL-MCNC: 8.6 MG/DL (ref 8.5–10.5)
CALCIUM SERPL-MCNC: 8.6 MG/DL (ref 8.5–10.5)
CHLORIDE SERPL-SCNC: 108 MMOL/L (ref 96–112)
CHLORIDE SERPL-SCNC: 109 MMOL/L (ref 96–112)
CO2 SERPL-SCNC: 25 MMOL/L (ref 20–33)
CO2 SERPL-SCNC: 25 MMOL/L (ref 20–33)
CREAT SERPL-MCNC: 0.62 MG/DL (ref 0.5–1.4)
CREAT SERPL-MCNC: 0.83 MG/DL (ref 0.5–1.4)
CRP SERPL HS-MCNC: 12.88 MG/DL (ref 0–0.75)
EOSINOPHIL # BLD AUTO: 0.19 K/UL (ref 0–0.51)
EOSINOPHIL NFR BLD: 2.6 % (ref 0–6.9)
ERYTHROCYTE [DISTWIDTH] IN BLOOD BY AUTOMATED COUNT: 45.3 FL (ref 35.9–50)
GFR SERPLBLD CREATININE-BSD FMLA CKD-EPI: 100 ML/MIN/1.73 M 2
GFR SERPLBLD CREATININE-BSD FMLA CKD-EPI: 91 ML/MIN/1.73 M 2
GLOBULIN SER CALC-MCNC: 3 G/DL (ref 1.9–3.5)
GLOBULIN SER CALC-MCNC: 3.1 G/DL (ref 1.9–3.5)
GLUCOSE BLD STRIP.AUTO-MCNC: 121 MG/DL (ref 65–99)
GLUCOSE BLD STRIP.AUTO-MCNC: 129 MG/DL (ref 65–99)
GLUCOSE BLD STRIP.AUTO-MCNC: 133 MG/DL (ref 65–99)
GLUCOSE BLD STRIP.AUTO-MCNC: 134 MG/DL (ref 65–99)
GLUCOSE BLD STRIP.AUTO-MCNC: 143 MG/DL (ref 65–99)
GLUCOSE BLD STRIP.AUTO-MCNC: 148 MG/DL (ref 65–99)
GLUCOSE BLD STRIP.AUTO-MCNC: 168 MG/DL (ref 65–99)
GLUCOSE BLD STRIP.AUTO-MCNC: 176 MG/DL (ref 65–99)
GLUCOSE BLD STRIP.AUTO-MCNC: 177 MG/DL (ref 65–99)
GLUCOSE BLD STRIP.AUTO-MCNC: 180 MG/DL (ref 65–99)
GLUCOSE BLD STRIP.AUTO-MCNC: 182 MG/DL (ref 65–99)
GLUCOSE BLD STRIP.AUTO-MCNC: 182 MG/DL (ref 65–99)
GLUCOSE BLD STRIP.AUTO-MCNC: 190 MG/DL (ref 65–99)
GLUCOSE BLD STRIP.AUTO-MCNC: 201 MG/DL (ref 65–99)
GLUCOSE BLD STRIP.AUTO-MCNC: 205 MG/DL (ref 65–99)
GLUCOSE BLD STRIP.AUTO-MCNC: 208 MG/DL (ref 65–99)
GLUCOSE BLD STRIP.AUTO-MCNC: 210 MG/DL (ref 65–99)
GLUCOSE BLD STRIP.AUTO-MCNC: 210 MG/DL (ref 65–99)
GLUCOSE BLD STRIP.AUTO-MCNC: 213 MG/DL (ref 65–99)
GLUCOSE BLD STRIP.AUTO-MCNC: 214 MG/DL (ref 65–99)
GLUCOSE BLD STRIP.AUTO-MCNC: 220 MG/DL (ref 65–99)
GLUCOSE BLD STRIP.AUTO-MCNC: 233 MG/DL (ref 65–99)
GLUCOSE BLD STRIP.AUTO-MCNC: 245 MG/DL (ref 65–99)
GLUCOSE BLD STRIP.AUTO-MCNC: 264 MG/DL (ref 65–99)
GLUCOSE SERPL-MCNC: 189 MG/DL (ref 65–99)
GLUCOSE SERPL-MCNC: 208 MG/DL (ref 65–99)
HCT VFR BLD AUTO: 33.5 % (ref 42–52)
HGB BLD-MCNC: 11.1 G/DL (ref 14–18)
IMM GRANULOCYTES # BLD AUTO: 0.03 K/UL (ref 0–0.11)
IMM GRANULOCYTES NFR BLD AUTO: 0.4 % (ref 0–0.9)
LYMPHOCYTES # BLD AUTO: 0.9 K/UL (ref 1–4.8)
LYMPHOCYTES NFR BLD: 12.2 % (ref 22–41)
MAGNESIUM SERPL-MCNC: 2.2 MG/DL (ref 1.5–2.5)
MCH RBC QN AUTO: 31.1 PG (ref 27–33)
MCHC RBC AUTO-ENTMCNC: 33.1 G/DL (ref 32.3–36.5)
MCV RBC AUTO: 93.8 FL (ref 81.4–97.8)
MONOCYTES # BLD AUTO: 1.09 K/UL (ref 0–0.85)
MONOCYTES NFR BLD AUTO: 14.8 % (ref 0–13.4)
NEUTROPHILS # BLD AUTO: 5.11 K/UL (ref 1.82–7.42)
NEUTROPHILS NFR BLD: 69.6 % (ref 44–72)
NRBC # BLD AUTO: 0 K/UL
NRBC BLD-RTO: 0 /100 WBC (ref 0–0.2)
PHOSPHATE SERPL-MCNC: 2 MG/DL (ref 2.5–4.5)
PLATELET # BLD AUTO: 214 K/UL (ref 164–446)
PMV BLD AUTO: 9.8 FL (ref 9–12.9)
POTASSIUM SERPL-SCNC: 4.1 MMOL/L (ref 3.6–5.5)
POTASSIUM SERPL-SCNC: 4.2 MMOL/L (ref 3.6–5.5)
POTASSIUM SERPL-SCNC: 4.4 MMOL/L (ref 3.6–5.5)
POTASSIUM SERPL-SCNC: 4.6 MMOL/L (ref 3.6–5.5)
PREALB SERPL-MCNC: 9.8 MG/DL (ref 18–38)
PROT SERPL-MCNC: 6 G/DL (ref 6–8.2)
PROT SERPL-MCNC: 6.1 G/DL (ref 6–8.2)
RBC # BLD AUTO: 3.57 M/UL (ref 4.7–6.1)
SODIUM SERPL-SCNC: 142 MMOL/L (ref 135–145)
SODIUM SERPL-SCNC: 143 MMOL/L (ref 135–145)
UFH PPP CHRO-ACNC: 0.15 IU/ML
UFH PPP CHRO-ACNC: 0.2 IU/ML
UFH PPP CHRO-ACNC: 0.25 IU/ML
WBC # BLD AUTO: 7.4 K/UL (ref 4.8–10.8)

## 2024-03-04 PROCEDURE — 83735 ASSAY OF MAGNESIUM: CPT

## 2024-03-04 PROCEDURE — A9270 NON-COVERED ITEM OR SERVICE: HCPCS | Performed by: NURSE PRACTITIONER

## 2024-03-04 PROCEDURE — 700102 HCHG RX REV CODE 250 W/ 637 OVERRIDE(OP): Performed by: SURGERY

## 2024-03-04 PROCEDURE — 84132 ASSAY OF SERUM POTASSIUM: CPT

## 2024-03-04 PROCEDURE — 700102 HCHG RX REV CODE 250 W/ 637 OVERRIDE(OP): Performed by: NURSE PRACTITIONER

## 2024-03-04 PROCEDURE — 700101 HCHG RX REV CODE 250: Performed by: STUDENT IN AN ORGANIZED HEALTH CARE EDUCATION/TRAINING PROGRAM

## 2024-03-04 PROCEDURE — 700111 HCHG RX REV CODE 636 W/ 250 OVERRIDE (IP): Performed by: SURGERY

## 2024-03-04 PROCEDURE — 84100 ASSAY OF PHOSPHORUS: CPT

## 2024-03-04 PROCEDURE — 770022 HCHG ROOM/CARE - ICU (200)

## 2024-03-04 PROCEDURE — 80053 COMPREHEN METABOLIC PANEL: CPT

## 2024-03-04 PROCEDURE — A9270 NON-COVERED ITEM OR SERVICE: HCPCS | Performed by: INTERNAL MEDICINE

## 2024-03-04 PROCEDURE — 700102 HCHG RX REV CODE 250 W/ 637 OVERRIDE(OP): Performed by: INTERNAL MEDICINE

## 2024-03-04 PROCEDURE — 94669 MECHANICAL CHEST WALL OSCILL: CPT

## 2024-03-04 PROCEDURE — 700102 HCHG RX REV CODE 250 W/ 637 OVERRIDE(OP): Performed by: STUDENT IN AN ORGANIZED HEALTH CARE EDUCATION/TRAINING PROGRAM

## 2024-03-04 PROCEDURE — A9270 NON-COVERED ITEM OR SERVICE: HCPCS | Performed by: STUDENT IN AN ORGANIZED HEALTH CARE EDUCATION/TRAINING PROGRAM

## 2024-03-04 PROCEDURE — 84134 ASSAY OF PREALBUMIN: CPT

## 2024-03-04 PROCEDURE — A9270 NON-COVERED ITEM OR SERVICE: HCPCS | Performed by: SURGERY

## 2024-03-04 PROCEDURE — 94640 AIRWAY INHALATION TREATMENT: CPT

## 2024-03-04 PROCEDURE — 700101 HCHG RX REV CODE 250: Performed by: SURGERY

## 2024-03-04 PROCEDURE — 86140 C-REACTIVE PROTEIN: CPT

## 2024-03-04 PROCEDURE — 700105 HCHG RX REV CODE 258: Performed by: SURGERY

## 2024-03-04 PROCEDURE — A9270 NON-COVERED ITEM OR SERVICE: HCPCS

## 2024-03-04 PROCEDURE — 71045 X-RAY EXAM CHEST 1 VIEW: CPT

## 2024-03-04 PROCEDURE — 85025 COMPLETE CBC W/AUTO DIFF WBC: CPT

## 2024-03-04 PROCEDURE — 700111 HCHG RX REV CODE 636 W/ 250 OVERRIDE (IP): Performed by: STUDENT IN AN ORGANIZED HEALTH CARE EDUCATION/TRAINING PROGRAM

## 2024-03-04 PROCEDURE — 82962 GLUCOSE BLOOD TEST: CPT | Mod: 91

## 2024-03-04 PROCEDURE — 700105 HCHG RX REV CODE 258: Performed by: STUDENT IN AN ORGANIZED HEALTH CARE EDUCATION/TRAINING PROGRAM

## 2024-03-04 PROCEDURE — 99291 CRITICAL CARE FIRST HOUR: CPT | Performed by: SURGERY

## 2024-03-04 PROCEDURE — 700102 HCHG RX REV CODE 250 W/ 637 OVERRIDE(OP)

## 2024-03-04 PROCEDURE — 85520 HEPARIN ASSAY: CPT | Mod: 91

## 2024-03-04 RX ORDER — IPRATROPIUM BROMIDE AND ALBUTEROL SULFATE 2.5; .5 MG/3ML; MG/3ML
3 SOLUTION RESPIRATORY (INHALATION)
Status: DISCONTINUED | OUTPATIENT
Start: 2024-03-04 | End: 2024-03-12 | Stop reason: HOSPADM

## 2024-03-04 RX ORDER — POTASSIUM CHLORIDE 7.45 MG/ML
10 INJECTION INTRAVENOUS ONCE
Status: COMPLETED | OUTPATIENT
Start: 2024-03-04 | End: 2024-03-04

## 2024-03-04 RX ORDER — SODIUM CHLORIDE FOR INHALATION 3 %
3 VIAL, NEBULIZER (ML) INHALATION
Status: DISCONTINUED | OUTPATIENT
Start: 2024-03-04 | End: 2024-03-08

## 2024-03-04 RX ADMIN — DEXMEDETOMIDINE HYDROCHLORIDE 1 MCG/KG/HR: 100 INJECTION, SOLUTION INTRAVENOUS at 02:14

## 2024-03-04 RX ADMIN — LANSOPRAZOLE 30 MG: 30 TABLET, ORALLY DISINTEGRATING, DELAYED RELEASE ORAL at 06:10

## 2024-03-04 RX ADMIN — HEPARIN SODIUM 20 UNITS/KG/HR: 5000 INJECTION, SOLUTION INTRAVENOUS at 02:11

## 2024-03-04 RX ADMIN — HEPARIN SODIUM 24 UNITS/KG/HR: 5000 INJECTION, SOLUTION INTRAVENOUS at 15:25

## 2024-03-04 RX ADMIN — GLYCOPYRROLATE 1 MG: 1 TABLET ORAL at 17:28

## 2024-03-04 RX ADMIN — LISINOPRIL 5 MG: 5 TABLET ORAL at 05:34

## 2024-03-04 RX ADMIN — SODIUM CHLORIDE 10 UNITS/HR: 9 INJECTION, SOLUTION INTRAVENOUS at 20:28

## 2024-03-04 RX ADMIN — DOCUSATE SODIUM 100 MG: 50 LIQUID ORAL at 05:33

## 2024-03-04 RX ADMIN — IPRATROPIUM BROMIDE AND ALBUTEROL SULFATE 3 ML: 2.5; .5 SOLUTION RESPIRATORY (INHALATION) at 20:38

## 2024-03-04 RX ADMIN — METOPROLOL TARTRATE 25 MG: 25 TABLET, FILM COATED ORAL at 05:34

## 2024-03-04 RX ADMIN — DOCUSATE SODIUM 50 MG AND SENNOSIDES 8.6 MG 1 TABLET: 8.6; 5 TABLET, FILM COATED ORAL at 20:22

## 2024-03-04 RX ADMIN — Medication 3 ML: at 20:39

## 2024-03-04 RX ADMIN — TERAZOSIN HYDROCHLORIDE 2 MG: 2 CAPSULE ORAL at 17:29

## 2024-03-04 RX ADMIN — LEVOTHYROXINE SODIUM 75 MCG: 0.07 TABLET ORAL at 05:33

## 2024-03-04 RX ADMIN — Medication 3 ML: at 15:54

## 2024-03-04 RX ADMIN — GABAPENTIN 300 MG: 300 CAPSULE ORAL at 05:33

## 2024-03-04 RX ADMIN — IPRATROPIUM BROMIDE AND ALBUTEROL SULFATE 3 ML: 2.5; .5 SOLUTION RESPIRATORY (INHALATION) at 15:54

## 2024-03-04 RX ADMIN — DOCUSATE SODIUM 100 MG: 50 LIQUID ORAL at 17:29

## 2024-03-04 RX ADMIN — ACETAMINOPHEN 650 MG: 325 TABLET, FILM COATED ORAL at 21:20

## 2024-03-04 RX ADMIN — GLYCOPYRROLATE 1 MG: 1 TABLET ORAL at 05:34

## 2024-03-04 RX ADMIN — POTASSIUM CHLORIDE 10 MEQ: 7.46 INJECTION, SOLUTION INTRAVENOUS at 03:31

## 2024-03-04 RX ADMIN — ASPIRIN 81 MG 81 MG: 81 TABLET ORAL at 05:33

## 2024-03-04 RX ADMIN — ROSUVASTATIN CALCIUM 20 MG: 20 TABLET, FILM COATED ORAL at 05:34

## 2024-03-04 RX ADMIN — POLYETHYLENE GLYCOL 3350 1 PACKET: 17 POWDER, FOR SOLUTION ORAL at 17:29

## 2024-03-04 RX ADMIN — OXYCODONE 5 MG: 5 TABLET ORAL at 20:22

## 2024-03-04 RX ADMIN — SODIUM PHOSPHATE, MONOBASIC, MONOHYDRATE AND SODIUM PHOSPHATE, DIBASIC, ANHYDROUS 30 MMOL: 142; 276 INJECTION, SOLUTION INTRAVENOUS at 10:53

## 2024-03-04 RX ADMIN — POTASSIUM CHLORIDE 10 MEQ: 7.46 INJECTION, SOLUTION INTRAVENOUS at 18:51

## 2024-03-04 RX ADMIN — GLYCOPYRROLATE 1 MG: 1 TABLET ORAL at 12:07

## 2024-03-04 RX ADMIN — METOPROLOL TARTRATE 25 MG: 25 TABLET, FILM COATED ORAL at 17:28

## 2024-03-04 RX ADMIN — MAGNESIUM HYDROXIDE 30 ML: 1200 LIQUID ORAL at 05:34

## 2024-03-04 RX ADMIN — GABAPENTIN 300 MG: 300 CAPSULE ORAL at 17:29

## 2024-03-04 RX ADMIN — DEXMEDETOMIDINE HYDROCHLORIDE 1 MCG/KG/HR: 100 INJECTION, SOLUTION INTRAVENOUS at 07:17

## 2024-03-04 ASSESSMENT — PAIN DESCRIPTION - PAIN TYPE
TYPE: ACUTE PAIN

## 2024-03-04 NOTE — PROGRESS NOTES
Patient oriented and communicating well today. Recalls the events of what happened to him at the gas station on the day of going to [employer]. States he did not have any syncope and instead just tripped at the gas station, leading to his injuries.     Per ENT, no speaking valve as of right now. Plan to be downsized trach on Wednesday.

## 2024-03-04 NOTE — CARE PLAN
Problem: Aerosol Therapy  Goal: Improved hydration/ability to mobilize secretions and/or decreased airway edema  Description: Target End Date:  resolve prior to discharge or when underlying condition is resolved/stabilized    1.  Implement heated or cool aerosol therapy  2.  Assessed for optimal hydration, decreased edema and/or improved ability to mobilize secretions  Outcome: Not Met       Respiratory Update    Treatment modality: 10L/40% T-piece, #8 Portex.  Moderate amount of secretions suctioned.    Pt tolerating current treatments well with no adverse reactions.

## 2024-03-04 NOTE — PROGRESS NOTES
"S: Up in chair-can phonate pretty well around trach.     O: BP (!) 145/65   Pulse 73   Temp 37.7 °C (99.9 °F) (Temporal)   Resp (!) 26   Ht 1.854 m (6' 0.99\")   Wt 112 kg (246 lb 11.1 oz)   SpO2 98%   Recent Labs     03/02/24  0520 03/03/24  0455 03/04/24  0435   WBC 7.8 6.8 7.4   RBC 3.93* 3.67* 3.57*   HEMOGLOBIN 12.3* 11.4* 11.1*   HEMATOCRIT 37.5* 34.3* 33.5*   MCV 95.4 93.5 93.8   MCH 31.3 31.1 31.1   MCHC 32.8 33.2 33.1   RDW 46.5 45.1 45.3   PLATELETCT 190 194 214   MPV 10.0 9.7 9.8     Recent Labs     03/03/24  1627 03/04/24  0000 03/04/24  0435 03/04/24  0850   SODIUM 143 143  --  142   POTASSIUM 4.4 4.2 4.6 4.4   CHLORIDE 108 108  --  109   CO2 25 25  --  25   GLUCOSE 186* 208*  --  189*   BUN 33* 32*  --  35*   CREATININE 0.77 0.83  --  0.62   CALCIUM 8.7 8.6  --  8.6     I/O last 3 completed shifts:  In: 3195.5 [I.V.:585.5; NG/GT:2340]  Out: 2610 [Urine:2075]  Neck-marked decrease in sq air.  Trach in good position    A: POD 3 Tracheostomy    P: Downsize on Wednesday, possibly cap at that time. Swallow eval ok Thursday this week.  Would not do speaking valve at this time-waiting for larynx to seal to avoid continued air leak and do no want pressure on larynx.   "

## 2024-03-04 NOTE — CARE PLAN
The patient is Watcher - Medium risk of patient condition declining or worsening    Shift Goals  Clinical Goals: pulmonary hygiene  Patient Goals: oziel  Family Goals: updates      Problem: Knowledge Deficit - Standard  Goal: Patient and family/care givers will demonstrate understanding of plan of care, disease process/condition, diagnostic tests and medications  Outcome: Progressing     Problem: Safety - Medical Restraint  Goal: Remains free of injury from restraints (Restraint for Interference with Medical Device)  Outcome: Progressing     Problem: Pain - Standard  Goal: Alleviation of pain or a reduction in pain to the patient’s comfort goal  Outcome: Progressing

## 2024-03-05 ENCOUNTER — APPOINTMENT (OUTPATIENT)
Dept: RADIOLOGY | Facility: MEDICAL CENTER | Age: 74
DRG: 004 | End: 2024-03-05
Attending: SURGERY
Payer: COMMERCIAL

## 2024-03-05 LAB
ALBUMIN SERPL BCP-MCNC: 2.8 G/DL (ref 3.2–4.9)
ALBUMIN/GLOB SERPL: 0.9 G/DL
ALP SERPL-CCNC: 168 U/L (ref 30–99)
ALT SERPL-CCNC: 469 U/L (ref 2–50)
ANION GAP SERPL CALC-SCNC: 11 MMOL/L (ref 7–16)
ANION GAP SERPL CALC-SCNC: 9 MMOL/L (ref 7–16)
AST SERPL-CCNC: 554 U/L (ref 12–45)
BASOPHILS # BLD AUTO: 0.6 % (ref 0–1.8)
BASOPHILS # BLD: 0.05 K/UL (ref 0–0.12)
BILIRUB SERPL-MCNC: 0.8 MG/DL (ref 0.1–1.5)
BUN SERPL-MCNC: 30 MG/DL (ref 8–22)
BUN SERPL-MCNC: 31 MG/DL (ref 8–22)
CALCIUM ALBUM COR SERPL-MCNC: 9.5 MG/DL (ref 8.5–10.5)
CALCIUM SERPL-MCNC: 8.5 MG/DL (ref 8.5–10.5)
CALCIUM SERPL-MCNC: 8.7 MG/DL (ref 8.5–10.5)
CHLORIDE SERPL-SCNC: 105 MMOL/L (ref 96–112)
CHLORIDE SERPL-SCNC: 109 MMOL/L (ref 96–112)
CO2 SERPL-SCNC: 25 MMOL/L (ref 20–33)
CO2 SERPL-SCNC: 26 MMOL/L (ref 20–33)
CREAT SERPL-MCNC: 0.67 MG/DL (ref 0.5–1.4)
CREAT SERPL-MCNC: 0.71 MG/DL (ref 0.5–1.4)
EOSINOPHIL # BLD AUTO: 0.14 K/UL (ref 0–0.51)
EOSINOPHIL NFR BLD: 1.8 % (ref 0–6.9)
ERYTHROCYTE [DISTWIDTH] IN BLOOD BY AUTOMATED COUNT: 45.9 FL (ref 35.9–50)
GFR SERPLBLD CREATININE-BSD FMLA CKD-EPI: 96 ML/MIN/1.73 M 2
GFR SERPLBLD CREATININE-BSD FMLA CKD-EPI: 97 ML/MIN/1.73 M 2
GLOBULIN SER CALC-MCNC: 3 G/DL (ref 1.9–3.5)
GLUCOSE BLD STRIP.AUTO-MCNC: 120 MG/DL (ref 65–99)
GLUCOSE BLD STRIP.AUTO-MCNC: 136 MG/DL (ref 65–99)
GLUCOSE BLD STRIP.AUTO-MCNC: 226 MG/DL (ref 65–99)
GLUCOSE BLD STRIP.AUTO-MCNC: 251 MG/DL (ref 65–99)
GLUCOSE BLD STRIP.AUTO-MCNC: 265 MG/DL (ref 65–99)
GLUCOSE BLD STRIP.AUTO-MCNC: 272 MG/DL (ref 65–99)
GLUCOSE BLD STRIP.AUTO-MCNC: 84 MG/DL (ref 65–99)
GLUCOSE BLD STRIP.AUTO-MCNC: 93 MG/DL (ref 65–99)
GLUCOSE SERPL-MCNC: 235 MG/DL (ref 65–99)
GLUCOSE SERPL-MCNC: 86 MG/DL (ref 65–99)
HCT VFR BLD AUTO: 31.5 % (ref 42–52)
HGB BLD-MCNC: 10.2 G/DL (ref 14–18)
IMM GRANULOCYTES # BLD AUTO: 0.04 K/UL (ref 0–0.11)
IMM GRANULOCYTES NFR BLD AUTO: 0.5 % (ref 0–0.9)
LYMPHOCYTES # BLD AUTO: 1.09 K/UL (ref 1–4.8)
LYMPHOCYTES NFR BLD: 13.9 % (ref 22–41)
MCH RBC QN AUTO: 30.6 PG (ref 27–33)
MCHC RBC AUTO-ENTMCNC: 32.4 G/DL (ref 32.3–36.5)
MCV RBC AUTO: 94.6 FL (ref 81.4–97.8)
MONOCYTES # BLD AUTO: 1.14 K/UL (ref 0–0.85)
MONOCYTES NFR BLD AUTO: 14.5 % (ref 0–13.4)
NEUTROPHILS # BLD AUTO: 5.38 K/UL (ref 1.82–7.42)
NEUTROPHILS NFR BLD: 68.7 % (ref 44–72)
NRBC # BLD AUTO: 0 K/UL
NRBC BLD-RTO: 0 /100 WBC (ref 0–0.2)
PLATELET # BLD AUTO: 205 K/UL (ref 164–446)
PMV BLD AUTO: 9.9 FL (ref 9–12.9)
POTASSIUM SERPL-SCNC: 3.8 MMOL/L (ref 3.6–5.5)
POTASSIUM SERPL-SCNC: 4.2 MMOL/L (ref 3.6–5.5)
PROT SERPL-MCNC: 5.8 G/DL (ref 6–8.2)
RBC # BLD AUTO: 3.33 M/UL (ref 4.7–6.1)
SODIUM SERPL-SCNC: 141 MMOL/L (ref 135–145)
SODIUM SERPL-SCNC: 144 MMOL/L (ref 135–145)
UFH PPP CHRO-ACNC: 0.25 IU/ML
UFH PPP CHRO-ACNC: 0.38 IU/ML
UFH PPP CHRO-ACNC: 0.41 IU/ML
WBC # BLD AUTO: 7.8 K/UL (ref 4.8–10.8)

## 2024-03-05 PROCEDURE — 700102 HCHG RX REV CODE 250 W/ 637 OVERRIDE(OP)

## 2024-03-05 PROCEDURE — 700101 HCHG RX REV CODE 250: Performed by: SURGERY

## 2024-03-05 PROCEDURE — 94669 MECHANICAL CHEST WALL OSCILL: CPT

## 2024-03-05 PROCEDURE — 700111 HCHG RX REV CODE 636 W/ 250 OVERRIDE (IP): Performed by: STUDENT IN AN ORGANIZED HEALTH CARE EDUCATION/TRAINING PROGRAM

## 2024-03-05 PROCEDURE — 85520 HEPARIN ASSAY: CPT | Mod: 91

## 2024-03-05 PROCEDURE — 700102 HCHG RX REV CODE 250 W/ 637 OVERRIDE(OP): Performed by: STUDENT IN AN ORGANIZED HEALTH CARE EDUCATION/TRAINING PROGRAM

## 2024-03-05 PROCEDURE — 700102 HCHG RX REV CODE 250 W/ 637 OVERRIDE(OP): Performed by: INTERNAL MEDICINE

## 2024-03-05 PROCEDURE — A9270 NON-COVERED ITEM OR SERVICE: HCPCS | Performed by: NURSE PRACTITIONER

## 2024-03-05 PROCEDURE — 700102 HCHG RX REV CODE 250 W/ 637 OVERRIDE(OP): Performed by: SURGERY

## 2024-03-05 PROCEDURE — A9270 NON-COVERED ITEM OR SERVICE: HCPCS | Performed by: SURGERY

## 2024-03-05 PROCEDURE — 80053 COMPREHEN METABOLIC PANEL: CPT

## 2024-03-05 PROCEDURE — 82962 GLUCOSE BLOOD TEST: CPT | Mod: 91

## 2024-03-05 PROCEDURE — 94640 AIRWAY INHALATION TREATMENT: CPT

## 2024-03-05 PROCEDURE — A9270 NON-COVERED ITEM OR SERVICE: HCPCS

## 2024-03-05 PROCEDURE — A9270 NON-COVERED ITEM OR SERVICE: HCPCS | Performed by: INTERNAL MEDICINE

## 2024-03-05 PROCEDURE — 700102 HCHG RX REV CODE 250 W/ 637 OVERRIDE(OP): Performed by: NURSE PRACTITIONER

## 2024-03-05 PROCEDURE — 85025 COMPLETE CBC W/AUTO DIFF WBC: CPT

## 2024-03-05 PROCEDURE — 700111 HCHG RX REV CODE 636 W/ 250 OVERRIDE (IP): Performed by: SURGERY

## 2024-03-05 PROCEDURE — 770022 HCHG ROOM/CARE - ICU (200)

## 2024-03-05 PROCEDURE — 700111 HCHG RX REV CODE 636 W/ 250 OVERRIDE (IP): Performed by: NURSE PRACTITIONER

## 2024-03-05 PROCEDURE — 71045 X-RAY EXAM CHEST 1 VIEW: CPT

## 2024-03-05 PROCEDURE — A9270 NON-COVERED ITEM OR SERVICE: HCPCS | Performed by: STUDENT IN AN ORGANIZED HEALTH CARE EDUCATION/TRAINING PROGRAM

## 2024-03-05 PROCEDURE — 80048 BASIC METABOLIC PNL TOTAL CA: CPT

## 2024-03-05 RX ORDER — QUETIAPINE FUMARATE 25 MG/1
50 TABLET, FILM COATED ORAL NIGHTLY
Status: DISCONTINUED | OUTPATIENT
Start: 2024-03-05 | End: 2024-03-11

## 2024-03-05 RX ORDER — POTASSIUM CHLORIDE 7.45 MG/ML
10 INJECTION INTRAVENOUS ONCE
Status: COMPLETED | OUTPATIENT
Start: 2024-03-05 | End: 2024-03-05

## 2024-03-05 RX ORDER — DEXTROSE MONOHYDRATE 25 G/50ML
25 INJECTION, SOLUTION INTRAVENOUS
Status: DISCONTINUED | OUTPATIENT
Start: 2024-03-05 | End: 2024-03-12 | Stop reason: HOSPADM

## 2024-03-05 RX ADMIN — DOCUSATE SODIUM 50 MG AND SENNOSIDES 8.6 MG 1 TABLET: 8.6; 5 TABLET, FILM COATED ORAL at 23:08

## 2024-03-05 RX ADMIN — ACETAMINOPHEN 650 MG: 325 TABLET, FILM COATED ORAL at 03:36

## 2024-03-05 RX ADMIN — TERAZOSIN HYDROCHLORIDE 2 MG: 2 CAPSULE ORAL at 18:12

## 2024-03-05 RX ADMIN — Medication 3 ML: at 06:58

## 2024-03-05 RX ADMIN — GLYCOPYRROLATE 1 MG: 1 TABLET ORAL at 18:12

## 2024-03-05 RX ADMIN — Medication 3 ML: at 19:31

## 2024-03-05 RX ADMIN — DOCUSATE SODIUM 100 MG: 50 LIQUID ORAL at 05:57

## 2024-03-05 RX ADMIN — Medication 3 ML: at 14:23

## 2024-03-05 RX ADMIN — METOPROLOL TARTRATE 25 MG: 25 TABLET, FILM COATED ORAL at 18:13

## 2024-03-05 RX ADMIN — LANSOPRAZOLE 30 MG: 30 TABLET, ORALLY DISINTEGRATING, DELAYED RELEASE ORAL at 06:01

## 2024-03-05 RX ADMIN — LEVOTHYROXINE SODIUM 75 MCG: 0.07 TABLET ORAL at 05:58

## 2024-03-05 RX ADMIN — GABAPENTIN 300 MG: 300 CAPSULE ORAL at 18:13

## 2024-03-05 RX ADMIN — HYDROMORPHONE HYDROCHLORIDE 0.5 MG: 1 INJECTION, SOLUTION INTRAMUSCULAR; INTRAVENOUS; SUBCUTANEOUS at 22:08

## 2024-03-05 RX ADMIN — GABAPENTIN 300 MG: 300 CAPSULE ORAL at 05:57

## 2024-03-05 RX ADMIN — HEPARIN SODIUM 28 UNITS/KG/HR: 5000 INJECTION, SOLUTION INTRAVENOUS at 02:43

## 2024-03-05 RX ADMIN — HEPARIN SODIUM 28 UNITS/KG/HR: 5000 INJECTION, SOLUTION INTRAVENOUS at 23:20

## 2024-03-05 RX ADMIN — ASPIRIN 81 MG 81 MG: 81 TABLET ORAL at 05:57

## 2024-03-05 RX ADMIN — MAGNESIUM HYDROXIDE 30 ML: 1200 LIQUID ORAL at 05:57

## 2024-03-05 RX ADMIN — IPRATROPIUM BROMIDE AND ALBUTEROL SULFATE 3 ML: 2.5; .5 SOLUTION RESPIRATORY (INHALATION) at 06:58

## 2024-03-05 RX ADMIN — POTASSIUM CHLORIDE 10 MEQ: 7.46 INJECTION, SOLUTION INTRAVENOUS at 02:53

## 2024-03-05 RX ADMIN — GLYCOPYRROLATE 1 MG: 1 TABLET ORAL at 12:13

## 2024-03-05 RX ADMIN — GLYCOPYRROLATE 1 MG: 1 TABLET ORAL at 05:59

## 2024-03-05 RX ADMIN — DOCUSATE SODIUM 100 MG: 50 LIQUID ORAL at 18:12

## 2024-03-05 RX ADMIN — METOPROLOL TARTRATE 25 MG: 25 TABLET, FILM COATED ORAL at 05:58

## 2024-03-05 RX ADMIN — POLYETHYLENE GLYCOL 3350 1 PACKET: 17 POWDER, FOR SOLUTION ORAL at 05:57

## 2024-03-05 RX ADMIN — IPRATROPIUM BROMIDE AND ALBUTEROL SULFATE 3 ML: 2.5; .5 SOLUTION RESPIRATORY (INHALATION) at 19:31

## 2024-03-05 RX ADMIN — OXYCODONE HYDROCHLORIDE 10 MG: 10 TABLET ORAL at 03:36

## 2024-03-05 RX ADMIN — HEPARIN SODIUM 28 UNITS/KG/HR: 5000 INJECTION, SOLUTION INTRAVENOUS at 12:59

## 2024-03-05 RX ADMIN — LISINOPRIL 5 MG: 5 TABLET ORAL at 05:57

## 2024-03-05 RX ADMIN — OXYCODONE 5 MG: 5 TABLET ORAL at 11:52

## 2024-03-05 RX ADMIN — QUETIAPINE FUMARATE 50 MG: 25 TABLET ORAL at 23:08

## 2024-03-05 RX ADMIN — IPRATROPIUM BROMIDE AND ALBUTEROL SULFATE 3 ML: 2.5; .5 SOLUTION RESPIRATORY (INHALATION) at 11:17

## 2024-03-05 RX ADMIN — ROSUVASTATIN CALCIUM 20 MG: 20 TABLET, FILM COATED ORAL at 05:57

## 2024-03-05 RX ADMIN — IPRATROPIUM BROMIDE AND ALBUTEROL SULFATE 3 ML: 2.5; .5 SOLUTION RESPIRATORY (INHALATION) at 14:22

## 2024-03-05 RX ADMIN — POLYETHYLENE GLYCOL 3350 1 PACKET: 17 POWDER, FOR SOLUTION ORAL at 18:12

## 2024-03-05 RX ADMIN — Medication 3 ML: at 11:17

## 2024-03-05 ASSESSMENT — PAIN DESCRIPTION - PAIN TYPE
TYPE: ACUTE PAIN

## 2024-03-05 ASSESSMENT — FIBROSIS 4 INDEX: FIB4 SCORE: 9.36

## 2024-03-05 NOTE — CARE PLAN
The patient is Watcher - Medium risk of patient condition declining or worsening    Shift Goals  Clinical Goals: clear secretions, stable hemodynamics  Patient Goals: VIANNEY  Family Goals: stay updated    Progress made toward(s) clinical / shift goals:  Updated patient on POC.   Problem: Knowledge Deficit - Standard  Goal: Patient and family/care givers will demonstrate understanding of plan of care, disease process/condition, diagnostic tests and medications  Outcome: Progressing     Problem: Safety - Medical Restraint  Goal: Remains free of injury from restraints (Restraint for Interference with Medical Device)  Outcome: Progressing     Problem: Safety - Medical Restraint  Goal: Free from restraint(s) (Restraint for Interference with Medical Device)  Outcome: Progressing     Problem: Pain - Standard  Goal: Alleviation of pain or a reduction in pain to the patient’s comfort goal  Outcome: Progressing     Problem: Fall Risk  Goal: Patient will remain free from falls  Outcome: Progressing

## 2024-03-05 NOTE — PROGRESS NOTES
"  DATE: 3/5/2024    Hospital Day 8  laryngeal injury after ground level fall .    INTERVAL EVENTS:  Transitioning off insulin drip today and starting long-acting basilar agent. Will monitor blood sugar and adjust sliding scale and basilar insulin as needed. Ongoing T-piece trials, secretions improving but still present. Plan for trach down-sizing tomorrow, will likely transition to long-acting anticoagulant after if no signs of ongoing bleeding.    REVIEW OF SYSTEMS:  Comprehensive review of systems is negative with the exception of the aforementioned HPI, PMH, and PSH bullets in accordance with CMS guidelines.    PHYSICAL EXAMINATION:  Vital Signs: BP (!) 148/64   Pulse 80   Temp 37.7 °C (99.9 °F) (Temporal)   Resp (!) 29   Ht 1.854 m (6' 0.99\")   Wt (!) 127 kg (280 lb 3.3 oz)   SpO2 100%   Physical Exam  Vitals and nursing note reviewed.   Constitutional:       General: He is not in acute distress.     Appearance: He is not toxic-appearing.      Interventions: He is intubated.   HENT:      Head: Normocephalic and atraumatic.      Right Ear: External ear normal.      Left Ear: External ear normal.      Nose:      Comments: Nasogastric tube in place.     Mouth/Throat:      Mouth: Mucous membranes are moist.      Pharynx: Oropharynx is clear.   Eyes:      General: No scleral icterus.     Pupils: Pupils are equal, round, and reactive to light.   Neck:      Comments: Tracheostomy in place, adjacent dried blood. Subcutaneous emphysema in the neck improving but still present.  Cardiovascular:      Rate and Rhythm: Normal rate.   Pulmonary:      Effort: He is intubated.      Breath sounds: Rhonchi present.   Abdominal:      General: There is no distension.      Palpations: Abdomen is soft.      Tenderness: There is no abdominal tenderness. There is no guarding or rebound.   Musculoskeletal:         General: Normal range of motion.   Skin:     General: Skin is warm and dry.      Capillary Refill: Capillary refill " takes less than 2 seconds.      Coloration: Skin is not jaundiced.   Neurological:      General: No focal deficit present.      Mental Status: He is alert.      GCS: GCS eye subscore is 4. GCS verbal subscore is 1. GCS motor subscore is 6.      Comments: GCS 11T.  Hard of hearing.   Psychiatric:         Behavior: Behavior is cooperative.         LABORATORY VALUES:  Recent Labs     03/03/24  0455 03/04/24  0435 03/05/24  0620   WBC 6.8 7.4 7.8   RBC 3.67* 3.57* 3.33*   HEMOGLOBIN 11.4* 11.1* 10.2*   HEMATOCRIT 34.3* 33.5* 31.5*   MCV 93.5 93.8 94.6   MCH 31.1 31.1 30.6   MCHC 33.2 33.1 32.4   RDW 45.1 45.3 45.9   PLATELETCT 194 214 205   MPV 9.7 9.8 9.9     Recent Labs     03/04/24  0850 03/04/24  1517 03/05/24  0020 03/05/24  0620   SODIUM 142  --  144 141   POTASSIUM 4.4 4.1 3.8 4.2   CHLORIDE 109  --  109 105   CO2 25  --  26 25   GLUCOSE 189*  --  86 235*   BUN 35*  --  30* 31*   CREATININE 0.62  --  0.67 0.71   CALCIUM 8.6  --  8.7 8.5     Recent Labs     03/03/24  1205 03/04/24  0000 03/04/24  0850 03/05/24  0620   ASTSGOT  --  159* 449* 554*   ALTSGPT  --  127* 270* 469*   TBILIRUBIN  --  0.4 0.4 0.8   ALKPHOSPHAT  --  99 124* 168*   GLOBULIN  --  3.0 3.1 3.0   INR 1.16*  --   --   --      Recent Labs     03/03/24  1205   APTT 38.9*   INR 1.16*       IMAGING:  DX-ABDOMEN FOR TUBE PLACEMENT   Final Result      Dobbhoff tube tip is coiled in the proximal stomach. Its tip is in the gastric fundus.      DX-CHEST-PORTABLE (1 VIEW)   Final Result      Stable mild CHF pattern. No change.      DX-ABDOMEN FOR TUBE PLACEMENT   Final Result      Feeding tube with distal tip overlying antrum of stomach.      DX-CHEST-PORTABLE (1 VIEW)   Final Result      No pneumothorax following chest tube removal. Improving neck soft tissue emphysema. Otherwise, stable.         DX-CHEST-PORTABLE (1 VIEW)   Final Result      No pneumothorax. Otherwise, stable.      DX-CHEST-PORTABLE (1 VIEW)   Final Result      Tiny, 4 mm left  pneumothorax. Otherwise, stable.      DX-CHEST-PORTABLE (1 VIEW)   Final Result      1.  Supportive tubing as described above.   2.  Worsening bibasilar infiltrate or atelectasis, particularly on the LEFT.   3.  No pneumothorax.      US-CAROTID DOPPLER BILAT   Final Result      DX-CHEST-PORTABLE (1 VIEW)   Final Result      Mildly worsening aeration with no other significant change.      DX-CHEST-PORTABLE (1 VIEW)   Final Result         1. No definite pneumomediastinum. No pneumothorax.      2. Neck soft tissue emphysema is redemonstrated.      EC-ECHOCARDIOGRAM COMPLETE W/O CONT   Final Result      DX-ABDOMEN FOR TUBE PLACEMENT   Final Result      The tip of the esophagogastric tube terminates over the fundus of the stomach.      DX-CHEST-PORTABLE (1 VIEW)   Final Result      Stable exam. Large amount of soft tissue emphysema and pneumomediastinum redemonstrated.      CT-SOFT TISSUE NECK WITH   Final Result         1. There is fracture of the thyroid cartilage, along with a suspected injury to the cricoid cartilage. Soft tissue density surrounds the ET tube within the larynx and injury is suspected.   2. Subcutaneous emphysema throughout the neck extending inferiorly. Pneumomediastinum      DX-CHEST-LIMITED (1 VIEW)   Final Result         1.  Pneumomediastinum. Soft tissue gas in the lower neck.   2.  Small pneumothorax on the left is suspected. Left chest tube is in place.      US-ABORTED US PROCEDURE    (Results Pending)       ASSESSMENT AND PLAN:  Elevated troponin- (present on admission)  Assessment & Plan  Admission troponin level 354.  EKG with SR without acute changes.  3/3 Cardiology recommending Heparin gtt, ASA 81, and Xarelto when feasible.  Secretions from trach are no longer bloody.  Heparin gtt and ASA started today  Otis Ni MD, Cardiology    Fracture closed, thyroid cartilage, initial encounter (HCC)- (present on admission)  Assessment & Plan  CT at [place] showing mildly displaced fracture of the  left para midline aspect of the thyroid cartilage.  Bronchoscopy in ICU without evidence of tracheobronchial injury distal to the endotracheal tube however bloody sections noted proximally.  CT neck with thyroid cartilage fracture & suspect injury to cricoid cartilage.  3/2 Tracheostomy placement. Direct laryngoscopy and tracheoscopy.  Flaco Contreras MD. [facility].    Pneumomediastinum (HCC)- (present on admission)  Assessment & Plan  Extensive gas is noted within the superficial and deep soft tissue planes of the upper chest and neck.  Opacification of a 4 cm segment of the trachea, distal to the cords, surrounding the endotracheal tube is present, and could represent a manifestation of tracheal injury.  CT neck with subcutaneous emphysema & pneumomediastinum.  ENT and GI consulted for concern for tracheal/esophageal injuries.  Flaco Contreras MD. [facility].  Don Armstrong MD. [facility].    Respiratory failure following trauma (HCC)- (present on admission)  Assessment & Plan  Intubated at [place].  3/1 Endoscopic evaluation of airway and trach placement today with ENT  3/2  Switch to trach collar, tolerating  3/3 Tolerating t piece.    Tracheostomy to be managed by ENT.  Not to be decannulated this admission.  Can follow up with ENT as an outpatient for decannulation when appropriate    Fever  Assessment & Plan  Temperature 100-101 F, responding to tylenol  Signficant secretion burden, likely from this, no clearly purulent, normal wbc count  Continue to monitor and treat with tylenol for now    Syncope and collapse- (present on admission)  Assessment & Plan  EKG sinus rhythm & left anterior fascicular block.  Trend troponin.   Echocardiogram with LVEF 50%.  Continuous cardiac monitoring  3/1 Some ectopy overnight self limited and seems to be asymptomatic, reviewed electrolytes WNL    Pneumothorax on left- (present on admission)  Assessment & Plan  Left  chest tube placed by [place].  2/29 Water seal chest tube.  3/1 Will keep chest tube until definitive management of airway  3/2 CXR in AM, if trace pneumothorax is stable, remove chest tube  3/3 CXR without pneumo, chest tube removal.  Serial CXRs    History of chronic CHF- (present on admission)  Assessment & Plan  Per chart review.   History of dilated cardiomyopathy  2/28 Echocardiogram with LVEF 50%.    Diabetes (HCC)- (present on admission)  Assessment & Plan  Chronic condition treated with Insulin and metformin.  Holding maintenance metformin for 48 hours following intravenous contrast administration.  Insulin sliding scale coverage.  3/1 15 u lantus resumed.   3/2 lantus increase to 25 u  3/3 Start insulin gtt  3/5 Insulin gtt stopped.    A-fib (HCC)- (present on admission)  Assessment & Plan  Chronic condition treated with Xarelto and Toprol Xl  EKG sinus rhythm.  Toprol XL resumed on admit.  Systemic anticoagulation held on admission.    No contraindication to deep vein thrombosis (DVT) prophylaxis- (present on admission)  Assessment & Plan  2/29 Prophylactic dose enoxaparin 40 mg BID initiated.     Trauma- (present on admission)  Assessment & Plan  Walked into gas station stated he didn't feel well then had syncopal episode.  Trauma Red Transfer Activation from [facility] in [place], [place].  Taz Ward MD. Trauma Surgery.    High cholesterol- (present on admission)  Assessment & Plan  Chronic condition treated with Crestor  Resumed maintenance medication on admission.      The patient remains critically injured with acute respiratory failure.  The patient was seen and examined on rounds and discussed with the multidisciplinary critical care team and consulting physicians. Critically evaluated laboratory tests, culture data, medications, imaging, and other diagnostic tests.    The patient has acute impairment of one or more vital organ systems and a high probability of imminent or  life-threatening deterioration in condition. Provided high complexity decision making to assess, manipulate, and support vital system functions to treat vital organ system failure and/or to prevent further life-threatening deterioration of the patient's condition. Requires continued ICU and hospital admission.    Critical care interventions include: integration of multiple data points and associated complex medical decision making, active ventilator management, and management of thrombotic surveillance and risk mitigation.    CRITICAL CARE TIME, EXCLUDING PROCEDURES: 35 minutes.     ____________________________________     Taz Ward M.D.    DD: 3/5/2024  3:57 PM

## 2024-03-05 NOTE — CARE PLAN
Problem: Aerosol Therapy  Goal: Improved hydration/ability to mobilize secretions and/or decreased airway edema  Description: Target End Date:  resolve prior to discharge or when underlying condition is resolved/stabilized    1.  Implement heated or cool aerosol therapy  2.  Assessed for optimal hydration, decreased edema and/or improved ability to mobilize secretions  3/5/2024 0433 by Annette Mohan RRT  Outcome: Progressing       Problem: Bronchopulmonary Hygiene  Goal: Increase mobilization of retained secretions  Description: Target End Date:  2 to 3 days    1.  Perform bronchopulmonary therapy as indicated by assessment  2.  Perform airway suctioning  3.  Perform actions to maintain patient airway  Outcome: Progressing       T Piece 10L/40%  8.0 portex  QID IPV  QID 3%

## 2024-03-05 NOTE — DISCHARGE PLANNING
Case Management Discharge Planning    Admission Date: 2/27/2024  GMLOS: 26.1  ALOS: 7    6-Clicks ADL Score:    6-Clicks Mobility Score:        Anticipated Discharge Dispo: Discharge Disposition: Disch to IP rehab facility or distinct part unit (62)  Discharge Contact Phone Number: [phone]      Action(s) Taken: Updated Provider/Nurse on Discharge Plan  Discussed in IDT rounds, possible transfer to floor tomorrow  Escalations Completed: None    Medically Clear: No    Barriers to Discharge: Medical clearance    Is the patient up for discharge tomorrow: No

## 2024-03-05 NOTE — PROGRESS NOTES
"  DATE: 3/4/2024    Hospital Day 7  laryngeal injury after ground level fall .    INTERVAL EVENTS:  On insulin drip for persistently elevated blood sugars, will continue until blood sugar controlled and 24-hours requirement determined. Continues to have thick brown secretions, 3% nebs initiated. On heparin drip, hemoglobin stable plan to transition to long-acting agent after trach down-sizing Wednesday. Ongoing T-piece trials.    REVIEW OF SYSTEMS:  Comprehensive review of systems is negative with the exception of the aforementioned HPI, PMH, and PSH bullets in accordance with CMS guidelines.    PHYSICAL EXAMINATION:  Vital Signs: BP (!) 113/98   Pulse 100   Temp 37.7 °C (99.8 °F) (Temporal)   Resp (!) 67   Ht 1.854 m (6' 0.99\")   Wt 112 kg (246 lb 11.1 oz)   SpO2 98%   Physical Exam  Vitals and nursing note reviewed.   Constitutional:       General: He is not in acute distress.     Appearance: He is not toxic-appearing.      Interventions: He is intubated.   HENT:      Head: Normocephalic and atraumatic.      Right Ear: External ear normal.      Left Ear: External ear normal.      Nose:      Comments: Nasogastric tube in place.     Mouth/Throat:      Mouth: Mucous membranes are moist.      Pharynx: Oropharynx is clear.   Eyes:      General: No scleral icterus.     Pupils: Pupils are equal, round, and reactive to light.   Neck:      Comments: Tracheostomy in place, some old blood around site. Mild subcutaneous emphysema in the neck.  Cardiovascular:      Rate and Rhythm: Normal rate and regular rhythm.   Pulmonary:      Effort: He is intubated.      Breath sounds: Rhonchi present.   Abdominal:      General: There is no distension.      Palpations: Abdomen is soft.      Tenderness: There is no abdominal tenderness. There is no guarding or rebound.   Musculoskeletal:         General: Normal range of motion.   Skin:     General: Skin is warm and dry.      Capillary Refill: Capillary refill takes less than 2 " seconds.      Coloration: Skin is not jaundiced.   Neurological:      General: No focal deficit present.      Mental Status: He is alert.      GCS: GCS eye subscore is 4. GCS verbal subscore is 1. GCS motor subscore is 6.      Comments: GCS 11T.   Psychiatric:         Behavior: Behavior is cooperative.         LABORATORY VALUES:  Recent Labs     03/02/24  0520 03/03/24  0455 03/04/24  0435   WBC 7.8 6.8 7.4   RBC 3.93* 3.67* 3.57*   HEMOGLOBIN 12.3* 11.4* 11.1*   HEMATOCRIT 37.5* 34.3* 33.5*   MCV 95.4 93.5 93.8   MCH 31.3 31.1 31.1   MCHC 32.8 33.2 33.1   RDW 46.5 45.1 45.3   PLATELETCT 190 194 214   MPV 10.0 9.7 9.8     Recent Labs     03/03/24  1627 03/04/24  0000 03/04/24  0435 03/04/24  0850 03/04/24  1517   SODIUM 143 143  --  142  --    POTASSIUM 4.4 4.2 4.6 4.4 4.1   CHLORIDE 108 108  --  109  --    CO2 25 25  --  25  --    GLUCOSE 186* 208*  --  189*  --    BUN 33* 32*  --  35*  --    CREATININE 0.77 0.83  --  0.62  --    CALCIUM 8.7 8.6  --  8.6  --      Recent Labs     03/03/24  0455 03/03/24  1205 03/04/24  0000 03/04/24  0850   ASTSGOT 79*  --  159* 449*   ALTSGPT 82*  --  127* 270*   TBILIRUBIN 0.4  --  0.4 0.4   ALKPHOSPHAT 89  --  99 124*   GLOBULIN 3.4  --  3.0 3.1   INR  --  1.16*  --   --      Recent Labs     03/03/24  1205   APTT 38.9*   INR 1.16*       IMAGING:  DX-ABDOMEN FOR TUBE PLACEMENT   Final Result      Feeding tube with distal tip overlying antrum of stomach.      DX-CHEST-PORTABLE (1 VIEW)   Final Result      No pneumothorax following chest tube removal. Improving neck soft tissue emphysema. Otherwise, stable.         DX-CHEST-PORTABLE (1 VIEW)   Final Result      No pneumothorax. Otherwise, stable.      DX-CHEST-PORTABLE (1 VIEW)   Final Result      Tiny, 4 mm left pneumothorax. Otherwise, stable.      DX-CHEST-PORTABLE (1 VIEW)   Final Result      1.  Supportive tubing as described above.   2.  Worsening bibasilar infiltrate or atelectasis, particularly on the LEFT.   3.  No  pneumothorax.      US-CAROTID DOPPLER BILAT   Final Result      DX-CHEST-PORTABLE (1 VIEW)   Final Result      Mildly worsening aeration with no other significant change.      DX-CHEST-PORTABLE (1 VIEW)   Final Result         1. No definite pneumomediastinum. No pneumothorax.      2. Neck soft tissue emphysema is redemonstrated.      EC-ECHOCARDIOGRAM COMPLETE W/O CONT   Final Result      DX-ABDOMEN FOR TUBE PLACEMENT   Final Result      The tip of the esophagogastric tube terminates over the fundus of the stomach.      DX-CHEST-PORTABLE (1 VIEW)   Final Result      Stable exam. Large amount of soft tissue emphysema and pneumomediastinum redemonstrated.      CT-SOFT TISSUE NECK WITH   Final Result         1. There is fracture of the thyroid cartilage, along with a suspected injury to the cricoid cartilage. Soft tissue density surrounds the ET tube within the larynx and injury is suspected.   2. Subcutaneous emphysema throughout the neck extending inferiorly. Pneumomediastinum      DX-CHEST-LIMITED (1 VIEW)   Final Result         1.  Pneumomediastinum. Soft tissue gas in the lower neck.   2.  Small pneumothorax on the left is suspected. Left chest tube is in place.      US-ABORTED US PROCEDURE    (Results Pending)       ASSESSMENT AND PLAN:  Elevated troponin- (present on admission)  Assessment & Plan  Admission troponin level 354.  EKG with SR without acute changes.  3/3 Cardiology recommending Heparin gtt, ASA 81, and Xarelto when feasible.  Secretions from trach are no longer bloody.  Heparin gtt and ASA started today  Otis Ni MD, Cardiology    Fracture closed, thyroid cartilage, initial encounter ([facility])- (present on admission)  Assessment & Plan  CT at [place] showing mildly displaced fracture of the left para midline aspect of the thyroid cartilage.  Bronchoscopy in ICU without evidence of tracheobronchial injury distal to the endotracheal tube however bloody sections noted proximally.  CT neck with thyroid  cartilage fracture & suspect injury to cricoid cartilage.  3/2 Tracheostomy placement. Direct laryngoscopy and tracheoscopy.  Flaco Contreras MD. [facility].    Pneumomediastinum (HCC)- (present on admission)  Assessment & Plan  Extensive gas is noted within the superficial and deep soft tissue planes of the upper chest and neck.  Opacification of a 4 cm segment of the trachea, distal to the cords, surrounding the endotracheal tube is present, and could represent a manifestation of tracheal injury.  CT neck with subcutaneous emphysema & pneumomediastinum.  ENT and GI consulted for concern for tracheal/esophageal injuries.  Flaco Contreras MD. [facility].  Don Armstrong MD. [facility].    Respiratory failure following trauma (HCC)- (present on admission)  Assessment & Plan  Intubated at [place].  3/1 Endoscopic evaluation of airway and trach placement today with ENT  3/2  Switch to trach collar, tolerating  3/3 Tolerating t piece.    Tracheostomy to be managed by ENT.  Not to be decannulated this admission.  Can follow up with ENT as an outpatient for decannulation when appropriate    Fever  Assessment & Plan  Temperature 100-101 F, responding to tylenol  Signficant secretion burden, likely from this, no clearly purulent, normal wbc count  Continue to monitor and treat with tylenol for now    Syncope and collapse- (present on admission)  Assessment & Plan  EKG sinus rhythm & left anterior fascicular block.  Trend troponin.   Echocardiogram with LVEF 50%.  Continuous cardiac monitoring  3/1 Some ectopy overnight self limited and seems to be asymptomatic, reviewed electrolytes WNL    Pneumothorax on left- (present on admission)  Assessment & Plan  Left chest tube placed by [place].  2/29 Water seal chest tube.  3/1 Will keep chest tube until definitive management of airway  3/2 CXR in AM, if trace pneumothorax is stable, remove chest tube  3/3 CXR without  pneumo, chest tube removal.  Serial CXRs    History of chronic CHF- (present on admission)  Assessment & Plan  Per chart review.   History of dilated cardiomyopathy  2/28 Echocardiogram with LVEF 50%.    Diabetes (HCC)- (present on admission)  Assessment & Plan  Chronic condition treated with Insulin and metformin.  Holding maintenance metformin for 48 hours following intravenous contrast administration.  Insulin sliding scale coverage.  3/1 15 u lantus resumed.   3/2 lantus increase to 25 u  3/3 Start insulin gtt    A-fib (HCC)- (present on admission)  Assessment & Plan  Chronic condition treated with Xarelto and Toprol Xl  EKG sinus rhythm.  Toprol XL resumed on admit.  Systemic anticoagulation held on admission.    No contraindication to deep vein thrombosis (DVT) prophylaxis- (present on admission)  Assessment & Plan  2/29 Prophylactic dose enoxaparin 40 mg BID initiated.     Trauma- (present on admission)  Assessment & Plan  Walked into gas station stated he didn't feel well then had syncopal episode.  Trauma Red Transfer Activation from [facility] in [place], [place].  Taz Ward MD. Trauma Surgery.    High cholesterol- (present on admission)  Assessment & Plan  Chronic condition treated with Crestor  Resumed maintenance medication on admission.      The patient remains critically injured with acute respiratory failure and laryngeal injury .  The patient was seen and examined on rounds and discussed with the multidisciplinary critical care team and consulting physicians. Critically evaluated laboratory tests, culture data, medications, imaging, and other diagnostic tests.    The patient has acute impairment of one or more vital organ systems and a high probability of imminent or life-threatening deterioration in condition. Provided high complexity decision making to assess, manipulate, and support vital system functions to treat vital organ system failure and/or to prevent further  life-threatening deterioration of the patient's condition. Requires continued ICU and hospital admission.    Critical care interventions include: integration of multiple data points and associated complex medical decision making, active ventilator management, parenteral management of hyperglycemia, and management of thrombotic surveillance and risk mitigation.    CRITICAL CARE TIME, EXCLUDING PROCEDURES: 35 minutes.     ____________________________________     Taz Ward M.D.    DD: 3/4/2024  5:37 PM

## 2024-03-05 NOTE — PROGRESS NOTES
"S: Stable. No significant changes. No 7 days out from fracture.     O: BP (!) 145/67   Pulse 79   Temp 37.1 °C (98.7 °F) (Temporal)   Resp (!) 39   Ht 1.854 m (6' 0.99\")   Wt (!) 127 kg (280 lb 3.3 oz)   SpO2 98%   Recent Labs     03/03/24  0455 03/04/24  0435 03/05/24  0620   WBC 6.8 7.4 7.8   RBC 3.67* 3.57* 3.33*   HEMOGLOBIN 11.4* 11.1* 10.2*   HEMATOCRIT 34.3* 33.5* 31.5*   MCV 93.5 93.8 94.6   MCH 31.1 31.1 30.6   MCHC 33.2 33.1 32.4   RDW 45.1 45.3 45.9   PLATELETCT 194 214 205   MPV 9.7 9.8 9.9     Recent Labs     03/04/24  0850 03/04/24  1517 03/05/24  0020 03/05/24  0620   SODIUM 142  --  144 141   POTASSIUM 4.4 4.1 3.8 4.2   CHLORIDE 109  --  109 105   CO2 25  --  26 25   GLUCOSE 189*  --  86 235*   BUN 35*  --  30* 31*   CREATININE 0.62  --  0.67 0.71   CALCIUM 8.6  --  8.7 8.5     I/O last 3 completed shifts:  In: 4152.8 [I.V.:933; Other:450; NG/GT:2275]  Out: 2175 [Urine:2175]  Neck-soft, trach in place  Scope-reasonably nl [id number]-[id number] to have bilateral nl tvc motion    A: POD 4 Tracheostomy for laryngeal fracture    P: Downsize tomorrow to 6 non cuffed-the new tube is in the room.   "

## 2024-03-06 ENCOUNTER — APPOINTMENT (OUTPATIENT)
Dept: RADIOLOGY | Facility: MEDICAL CENTER | Age: 74
DRG: 004 | End: 2024-03-06
Attending: SURGERY
Payer: COMMERCIAL

## 2024-03-06 PROBLEM — R33.9 URINARY RETENTION: Status: ACTIVE | Noted: 2024-03-06

## 2024-03-06 PROBLEM — I10 PRIMARY HYPERTENSION: Status: ACTIVE | Noted: 2024-03-06

## 2024-03-06 PROBLEM — K21.9 GERD (GASTROESOPHAGEAL REFLUX DISEASE): Status: ACTIVE | Noted: 2024-03-06

## 2024-03-06 PROBLEM — E03.9 HYPOTHYROID: Status: ACTIVE | Noted: 2024-03-06

## 2024-03-06 LAB
ALBUMIN SERPL BCP-MCNC: 2.9 G/DL (ref 3.2–4.9)
ALBUMIN/GLOB SERPL: 0.9 G/DL
ALP SERPL-CCNC: 162 U/L (ref 30–99)
ALT SERPL-CCNC: 350 U/L (ref 2–50)
ANION GAP SERPL CALC-SCNC: 12 MMOL/L (ref 7–16)
AST SERPL-CCNC: 209 U/L (ref 12–45)
BASOPHILS # BLD AUTO: 0.7 % (ref 0–1.8)
BASOPHILS # BLD: 0.05 K/UL (ref 0–0.12)
BILIRUB SERPL-MCNC: 0.7 MG/DL (ref 0.1–1.5)
BUN SERPL-MCNC: 25 MG/DL (ref 8–22)
CALCIUM ALBUM COR SERPL-MCNC: 9.5 MG/DL (ref 8.5–10.5)
CALCIUM SERPL-MCNC: 8.6 MG/DL (ref 8.5–10.5)
CHLORIDE SERPL-SCNC: 105 MMOL/L (ref 96–112)
CO2 SERPL-SCNC: 24 MMOL/L (ref 20–33)
CREAT SERPL-MCNC: 0.65 MG/DL (ref 0.5–1.4)
EOSINOPHIL # BLD AUTO: 0.13 K/UL (ref 0–0.51)
EOSINOPHIL NFR BLD: 1.9 % (ref 0–6.9)
ERYTHROCYTE [DISTWIDTH] IN BLOOD BY AUTOMATED COUNT: 45.7 FL (ref 35.9–50)
GFR SERPLBLD CREATININE-BSD FMLA CKD-EPI: 98 ML/MIN/1.73 M 2
GLOBULIN SER CALC-MCNC: 3.2 G/DL (ref 1.9–3.5)
GLUCOSE BLD STRIP.AUTO-MCNC: 273 MG/DL (ref 65–99)
GLUCOSE BLD STRIP.AUTO-MCNC: 293 MG/DL (ref 65–99)
GLUCOSE BLD STRIP.AUTO-MCNC: 342 MG/DL (ref 65–99)
GLUCOSE SERPL-MCNC: 292 MG/DL (ref 65–99)
HCT VFR BLD AUTO: 32.4 % (ref 42–52)
HGB BLD-MCNC: 10.6 G/DL (ref 14–18)
IMM GRANULOCYTES # BLD AUTO: 0.04 K/UL (ref 0–0.11)
IMM GRANULOCYTES NFR BLD AUTO: 0.6 % (ref 0–0.9)
LYMPHOCYTES # BLD AUTO: 0.96 K/UL (ref 1–4.8)
LYMPHOCYTES NFR BLD: 13.8 % (ref 22–41)
MCH RBC QN AUTO: 30.6 PG (ref 27–33)
MCHC RBC AUTO-ENTMCNC: 32.7 G/DL (ref 32.3–36.5)
MCV RBC AUTO: 93.6 FL (ref 81.4–97.8)
MONOCYTES # BLD AUTO: 1.21 K/UL (ref 0–0.85)
MONOCYTES NFR BLD AUTO: 17.4 % (ref 0–13.4)
NEUTROPHILS # BLD AUTO: 4.58 K/UL (ref 1.82–7.42)
NEUTROPHILS NFR BLD: 65.6 % (ref 44–72)
NRBC # BLD AUTO: 0 K/UL
NRBC BLD-RTO: 0 /100 WBC (ref 0–0.2)
PLATELET # BLD AUTO: 232 K/UL (ref 164–446)
PMV BLD AUTO: 9.7 FL (ref 9–12.9)
POTASSIUM SERPL-SCNC: 3.8 MMOL/L (ref 3.6–5.5)
PROT SERPL-MCNC: 6.1 G/DL (ref 6–8.2)
RBC # BLD AUTO: 3.46 M/UL (ref 4.7–6.1)
SODIUM SERPL-SCNC: 141 MMOL/L (ref 135–145)
WBC # BLD AUTO: 7 K/UL (ref 4.8–10.8)

## 2024-03-06 PROCEDURE — A9270 NON-COVERED ITEM OR SERVICE: HCPCS | Performed by: NURSE PRACTITIONER

## 2024-03-06 PROCEDURE — 302101 FENESTRATED FOAM: Performed by: SURGERY

## 2024-03-06 PROCEDURE — 94640 AIRWAY INHALATION TREATMENT: CPT

## 2024-03-06 PROCEDURE — A9270 NON-COVERED ITEM OR SERVICE: HCPCS | Performed by: SURGERY

## 2024-03-06 PROCEDURE — A9270 NON-COVERED ITEM OR SERVICE: HCPCS | Performed by: INTERNAL MEDICINE

## 2024-03-06 PROCEDURE — A9270 NON-COVERED ITEM OR SERVICE: HCPCS | Performed by: OTOLARYNGOLOGY

## 2024-03-06 PROCEDURE — 700102 HCHG RX REV CODE 250 W/ 637 OVERRIDE(OP): Performed by: INTERNAL MEDICINE

## 2024-03-06 PROCEDURE — 700102 HCHG RX REV CODE 250 W/ 637 OVERRIDE(OP): Performed by: NURSE PRACTITIONER

## 2024-03-06 PROCEDURE — 82962 GLUCOSE BLOOD TEST: CPT | Mod: 91

## 2024-03-06 PROCEDURE — A9270 NON-COVERED ITEM OR SERVICE: HCPCS | Performed by: STUDENT IN AN ORGANIZED HEALTH CARE EDUCATION/TRAINING PROGRAM

## 2024-03-06 PROCEDURE — 700102 HCHG RX REV CODE 250 W/ 637 OVERRIDE(OP): Performed by: OTOLARYNGOLOGY

## 2024-03-06 PROCEDURE — 700101 HCHG RX REV CODE 250: Performed by: SURGERY

## 2024-03-06 PROCEDURE — 85025 COMPLETE CBC W/AUTO DIFF WBC: CPT

## 2024-03-06 PROCEDURE — 700102 HCHG RX REV CODE 250 W/ 637 OVERRIDE(OP): Performed by: SURGERY

## 2024-03-06 PROCEDURE — 99233 SBSQ HOSP IP/OBS HIGH 50: CPT | Performed by: SURGERY

## 2024-03-06 PROCEDURE — 700102 HCHG RX REV CODE 250 W/ 637 OVERRIDE(OP): Performed by: STUDENT IN AN ORGANIZED HEALTH CARE EDUCATION/TRAINING PROGRAM

## 2024-03-06 PROCEDURE — A9270 NON-COVERED ITEM OR SERVICE: HCPCS

## 2024-03-06 PROCEDURE — 700102 HCHG RX REV CODE 250 W/ 637 OVERRIDE(OP)

## 2024-03-06 PROCEDURE — 80053 COMPREHEN METABOLIC PANEL: CPT

## 2024-03-06 PROCEDURE — 0B21XFZ CHANGE TRACHEOSTOMY DEVICE IN TRACHEA, EXTERNAL APPROACH: ICD-10-PCS | Performed by: OTOLARYNGOLOGY

## 2024-03-06 PROCEDURE — 770022 HCHG ROOM/CARE - ICU (200)

## 2024-03-06 PROCEDURE — 700111 HCHG RX REV CODE 636 W/ 250 OVERRIDE (IP): Performed by: STUDENT IN AN ORGANIZED HEALTH CARE EDUCATION/TRAINING PROGRAM

## 2024-03-06 PROCEDURE — 71045 X-RAY EXAM CHEST 1 VIEW: CPT

## 2024-03-06 PROCEDURE — 94669 MECHANICAL CHEST WALL OSCILL: CPT

## 2024-03-06 RX ORDER — CELECOXIB 200 MG/1
200 CAPSULE ORAL DAILY
Status: DISCONTINUED | OUTPATIENT
Start: 2024-03-07 | End: 2024-03-11

## 2024-03-06 RX ORDER — OMEPRAZOLE 20 MG/1
40 CAPSULE, DELAYED RELEASE ORAL DAILY
Status: DISCONTINUED | OUTPATIENT
Start: 2024-03-06 | End: 2024-03-06

## 2024-03-06 RX ORDER — LEVOTHYROXINE SODIUM 0.07 MG/1
75 TABLET ORAL
Status: DISCONTINUED | OUTPATIENT
Start: 2024-03-06 | End: 2024-03-06

## 2024-03-06 RX ORDER — TAMSULOSIN HYDROCHLORIDE 0.4 MG/1
0.4 CAPSULE ORAL
Status: DISCONTINUED | OUTPATIENT
Start: 2024-03-06 | End: 2024-03-06

## 2024-03-06 RX ORDER — ROSUVASTATIN CALCIUM 20 MG/1
20 TABLET, COATED ORAL EVERY EVENING
Status: DISCONTINUED | OUTPATIENT
Start: 2024-03-06 | End: 2024-03-06

## 2024-03-06 RX ORDER — CELECOXIB 200 MG/1
200 CAPSULE ORAL DAILY
Status: DISCONTINUED | OUTPATIENT
Start: 2024-03-06 | End: 2024-03-06

## 2024-03-06 RX ORDER — CEPHALEXIN 250 MG/5ML
500 POWDER, FOR SUSPENSION ORAL EVERY 8 HOURS
Status: DISCONTINUED | OUTPATIENT
Start: 2024-03-06 | End: 2024-03-11

## 2024-03-06 RX ORDER — LISINOPRIL 2.5 MG/1
2.5 TABLET ORAL DAILY
Status: DISCONTINUED | OUTPATIENT
Start: 2024-03-06 | End: 2024-03-06

## 2024-03-06 RX ADMIN — LISINOPRIL 5 MG: 5 TABLET ORAL at 05:44

## 2024-03-06 RX ADMIN — GABAPENTIN 300 MG: 300 CAPSULE ORAL at 18:36

## 2024-03-06 RX ADMIN — Medication 3 ML: at 15:10

## 2024-03-06 RX ADMIN — LANSOPRAZOLE 30 MG: 30 TABLET, ORALLY DISINTEGRATING, DELAYED RELEASE ORAL at 06:46

## 2024-03-06 RX ADMIN — IPRATROPIUM BROMIDE AND ALBUTEROL SULFATE 3 ML: 2.5; .5 SOLUTION RESPIRATORY (INHALATION) at 15:11

## 2024-03-06 RX ADMIN — ROSUVASTATIN CALCIUM 20 MG: 20 TABLET, FILM COATED ORAL at 05:44

## 2024-03-06 RX ADMIN — CEPHALEXIN 500 MG: 250 FOR SUSPENSION ORAL at 13:37

## 2024-03-06 RX ADMIN — GLYCOPYRROLATE 1 MG: 1 TABLET ORAL at 18:36

## 2024-03-06 RX ADMIN — GABAPENTIN 300 MG: 300 CAPSULE ORAL at 05:44

## 2024-03-06 RX ADMIN — IPRATROPIUM BROMIDE AND ALBUTEROL SULFATE 3 ML: 2.5; .5 SOLUTION RESPIRATORY (INHALATION) at 10:11

## 2024-03-06 RX ADMIN — METOPROLOL TARTRATE 25 MG: 25 TABLET, FILM COATED ORAL at 05:44

## 2024-03-06 RX ADMIN — Medication 3 ML: at 10:20

## 2024-03-06 RX ADMIN — DOCUSATE SODIUM 100 MG: 50 LIQUID ORAL at 05:44

## 2024-03-06 RX ADMIN — GLYCOPYRROLATE 1 MG: 1 TABLET ORAL at 13:36

## 2024-03-06 RX ADMIN — RIVAROXABAN 20 MG: 20 TABLET, FILM COATED ORAL at 18:36

## 2024-03-06 RX ADMIN — METOPROLOL TARTRATE 25 MG: 25 TABLET, FILM COATED ORAL at 18:36

## 2024-03-06 RX ADMIN — QUETIAPINE FUMARATE 50 MG: 25 TABLET ORAL at 22:09

## 2024-03-06 RX ADMIN — GLYCOPYRROLATE 1 MG: 1 TABLET ORAL at 05:45

## 2024-03-06 RX ADMIN — POTASSIUM BICARBONATE 25 MEQ: 978 TABLET, EFFERVESCENT ORAL at 10:35

## 2024-03-06 RX ADMIN — IPRATROPIUM BROMIDE AND ALBUTEROL SULFATE 3 ML: 2.5; .5 SOLUTION RESPIRATORY (INHALATION) at 07:35

## 2024-03-06 RX ADMIN — HEPARIN SODIUM 28 UNITS/KG/HR: 5000 INJECTION, SOLUTION INTRAVENOUS at 09:09

## 2024-03-06 RX ADMIN — ASPIRIN 81 MG 81 MG: 81 TABLET ORAL at 05:44

## 2024-03-06 RX ADMIN — POLYETHYLENE GLYCOL 3350 1 PACKET: 17 POWDER, FOR SOLUTION ORAL at 05:44

## 2024-03-06 RX ADMIN — Medication 3 ML: at 07:35

## 2024-03-06 RX ADMIN — TERAZOSIN HYDROCHLORIDE 2 MG: 2 CAPSULE ORAL at 18:36

## 2024-03-06 RX ADMIN — LEVOTHYROXINE SODIUM 75 MCG: 0.07 TABLET ORAL at 05:44

## 2024-03-06 RX ADMIN — CEPHALEXIN 500 MG: 250 FOR SUSPENSION ORAL at 22:09

## 2024-03-06 ASSESSMENT — PAIN DESCRIPTION - PAIN TYPE
TYPE: ACUTE PAIN

## 2024-03-06 ASSESSMENT — FIBROSIS 4 INDEX: FIB4 SCORE: 9.36

## 2024-03-06 NOTE — DIETARY
Nutrition support weekly update:  Day 8 of admit. Parvez Peguero is a 74 y.o. 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      Tube feeding initiated on 2/29. Current TF via NG tube: Vital High Protein @ 65 mL/hour, providing 1560 kcals, 136 g protein, and 1304 mL free water.      Assessment:  Weight 133 kg 3/6 (likely fluid related weight gain; has gained 20 kg since 3/3 and edema is noted in flowsheet. Suspected dry weight closer to 113 kg as was his weight on 3/3)  IBW = 83.6 kg    Re estimation of nutrient needs: MSJx1.0 = 1911 kcals  Energy: 1243 - 1582 kcals/day (11 - 14 kcals/kg)  Protein: 125 - 167 g/day (1.5 - 2.0 g/kg of IBW)  (Estimations based on obesity critical care guidelines)    Evaluation:   On t-piece.  TF access: NG tube.   Per RN, pt has been tolerating TF.   Medications: precedex (stopped), Colace, SSI, Glargine (future med)  lansoprazole, rosuvastatin, levothyroxine, Milk of Magnesia, Miralax, senna-docusate.  Labs: glucose 292,  - 293 (from 3/5 - 3/6), BUN 25, albumin 2.9.  +BM 3/5 per flowsheet; per RN, pt had large BM this am.   Skin: edema present, 2+ generalized on 3/5, 1+ generalized on 3/6. Neck incision (trach), no other wounds per EMR.   Current feeding regiment remains appropriate to meet needs.    Malnutrition risk: None identified at this time.     Recommendations/Plan:  Continue Vital High Protein TF formula at goal rate of 65 mL/hour. This provides 1560 kcals, 136 g protein, and 1304 mL free water.   Fluids per MD.  Monitor weights.     RD following.

## 2024-03-06 NOTE — CARE PLAN
The patient is Stable - Low risk of patient condition declining or worsening    Shift Goals  Clinical Goals: secretion management, mobilize, sleep  Patient Goals: stay informed, trach comfort  Family Goals: Updates    Progress made toward(s) clinical / shift goals:    Problem: Knowledge Deficit - Standard  Goal: Patient and family/care givers will demonstrate understanding of plan of care, disease process/condition, diagnostic tests and medications  Outcome: Progressing     Problem: Fall Risk  Goal: Patient will remain free from falls  Outcome: Progressing     Problem: Hemodynamics  Goal: Patient's hemodynamics, fluid balance and neurologic status will be stable or improve  Outcome: Progressing     Problem: Respiratory  Goal: Patient will achieve/maintain optimum respiratory ventilation and gas exchange  Outcome: Progressing     Problem: Mechanical Ventilation  Goal: Successful weaning off mechanical ventilator, spontaneously maintains adequate gas exchange  Outcome: Progressing  Goal: Patient will be able to express needs and understand communication  Outcome: Progressing     Problem: Pain - Standard  Goal: Alleviation of pain or a reduction in pain to the patient’s comfort goal  Outcome: Progressing       Patient is not progressing towards the following goals:

## 2024-03-06 NOTE — CARE PLAN
Problem: Aerosol Therapy  Goal: Improved hydration/ability to mobilize secretions and/or decreased airway edema  Description: Target End Date:  resolve prior to discharge or when underlying condition is resolved/stabilized    1.  Implement heated or cool aerosol therapy  2.  Assessed for optimal hydration, decreased edema and/or improved ability to mobilize secretions  Outcome: Progressing     Problem: Bronchopulmonary Hygiene  Goal: Increase mobilization of retained secretions  Description: Target End Date:  2 to 3 days    1.  Perform bronchopulmonary therapy as indicated by assessment  2.  Perform airway suctioning  3.  Perform actions to maintain patient airway  Outcome: Progressing       8.0 portex  10L/40%  QID IPV/ QID 3%

## 2024-03-06 NOTE — ASSESSMENT & PLAN NOTE
Chronic condition treated with omeprazole.  Holding maintenance medication during acute traumatic illness.  Lansoprasole via enteral tube.

## 2024-03-06 NOTE — ASSESSMENT & PLAN NOTE
Chronic condition treated with tamsulosin.  Holding maintenance medication during acute traumatic illness. Consider initiation when tolerating PO.

## 2024-03-06 NOTE — PROGRESS NOTES
"S: Stable but uncomfortable    O: BP (!) 190/79   Pulse (!) 103   Temp 37.1 °C (98.8 °F) (Temporal)   Resp (!) 77   Ht 1.854 m (6' 0.99\")   Wt (!) 133 kg (292 lb 15.9 oz)   SpO2 97%   Recent Labs     03/04/24  0435 03/05/24  0620 03/06/24  0430   WBC 7.4 7.8 7.0   RBC 3.57* 3.33* 3.46*   HEMOGLOBIN 11.1* 10.2* 10.6*   HEMATOCRIT 33.5* 31.5* 32.4*   MCV 93.8 94.6 93.6   MCH 31.1 30.6 30.6   MCHC 33.1 32.4 32.7   RDW 45.3 45.9 45.7   PLATELETCT 214 205 232   MPV 9.8 9.9 9.7     Recent Labs     03/05/24  0020 03/05/24  0620 03/06/24  0430   SODIUM 144 141 141   POTASSIUM 3.8 4.2 3.8   CHLORIDE 109 105 105   CO2 26 25 24   GLUCOSE 86 235* 292*   BUN 30* 31* 25*   CREATININE 0.67 0.71 0.65   CALCIUM 8.7 8.5 8.6     I/O last 3 completed shifts:  In: 4790.5 [I.V.:1845.8; Other:570; NG/GT:1950]  Out: 2520 [Urine:2520]  Neck-trach site soiled and macerated-purulaent secretions    A: POD5 Tracheostomy for laryngeal fracture    P: Downsized to 6 noncuffed.  Will start some antibiotics and needs trach care with optifoam-discussed with nurses.Will either do speaking valve or plug trach tube tomorrow and see if can tolerate.  Still no speaking valve today.   "

## 2024-03-06 NOTE — PROGRESS NOTES
IRIS Placement    Tube Team verified patient name and medical record number prior to tube placement.  IRIS tube (55 inches, 10 Macedonian) placed at 70 cm in right nare.  Per IRIS picture, tube appears to be in the stomach.  Nursing Instructions: Awaiting KUB to confirm placement before use for medications or feeding. Once placement confirmed, flush tube with 30 ml of water, and then remove and save stylet, in patient medication drawer.

## 2024-03-06 NOTE — PROGRESS NOTES
4 Eyes Skin Assessment Completed by MITA Lanza and MITA Amaya.    Head WDL  Ears WDL  Nose WDL  Mouth WDL  Neck Redness and Incision moisture at trache site  Breast/Chest WDL  Shoulder Blades WDL  Spine WDL  (R) Arm/Elbow/Hand Scab  (L) Arm/Elbow/Hand Redness, Blanching, and Scab  Abdomen WDL  Groin WDL  Scrotum/Coccyx/Buttocks WDL  (R) Leg WDL  (L) Leg Scab  (R) Heel/Foot/Toe WDL  (L) Heel/Foot/Toe WDL    Devices In Places ECG, Blood Pressure Cuff, Pulse Ox, Cortrak, and Tracheostomy    Interventions In Place Sacral Mepilex, TAP System, Pillows, Optifoam, Q2 Turns, Low Air Loss Mattress, and ZFlo Pillow    Possible Skin Injury Yes    Pictures Uploaded Into Epic Yes  Wound Consult Placed Yes  RN Wound Prevention Protocol Ordered Yes

## 2024-03-06 NOTE — CARE PLAN
The patient is Stable - Low risk of patient condition declining or worsening    Shift Goals  Clinical Goals: mobility, mobilize and manage secretions  Patient Goals: stay informed  Family Goals: spend time with patient    Progress made toward(s) clinical / shift goals:  Patient no longer requiring restraints to remain free injury.     Patient is not progressing towards the following goals:

## 2024-03-07 ENCOUNTER — APPOINTMENT (OUTPATIENT)
Dept: RADIOLOGY | Facility: MEDICAL CENTER | Age: 74
DRG: 004 | End: 2024-03-07
Attending: SURGERY
Payer: COMMERCIAL

## 2024-03-07 LAB
ALBUMIN SERPL BCP-MCNC: 2.9 G/DL (ref 3.2–4.9)
ALBUMIN/GLOB SERPL: 0.9 G/DL
ALP SERPL-CCNC: 156 U/L (ref 30–99)
ALT SERPL-CCNC: 275 U/L (ref 2–50)
ANION GAP SERPL CALC-SCNC: 10 MMOL/L (ref 7–16)
AST SERPL-CCNC: 122 U/L (ref 12–45)
BASOPHILS # BLD AUTO: 0.4 % (ref 0–1.8)
BASOPHILS # BLD: 0.03 K/UL (ref 0–0.12)
BILIRUB SERPL-MCNC: 0.6 MG/DL (ref 0.1–1.5)
BUN SERPL-MCNC: 24 MG/DL (ref 8–22)
CALCIUM ALBUM COR SERPL-MCNC: 9.6 MG/DL (ref 8.5–10.5)
CALCIUM SERPL-MCNC: 8.7 MG/DL (ref 8.5–10.5)
CHLORIDE SERPL-SCNC: 104 MMOL/L (ref 96–112)
CO2 SERPL-SCNC: 27 MMOL/L (ref 20–33)
CREAT SERPL-MCNC: 0.61 MG/DL (ref 0.5–1.4)
EOSINOPHIL # BLD AUTO: 0.07 K/UL (ref 0–0.51)
EOSINOPHIL NFR BLD: 0.8 % (ref 0–6.9)
ERYTHROCYTE [DISTWIDTH] IN BLOOD BY AUTOMATED COUNT: 46.6 FL (ref 35.9–50)
GFR SERPLBLD CREATININE-BSD FMLA CKD-EPI: 100 ML/MIN/1.73 M 2
GLOBULIN SER CALC-MCNC: 3.1 G/DL (ref 1.9–3.5)
GLUCOSE BLD STRIP.AUTO-MCNC: 245 MG/DL (ref 65–99)
GLUCOSE BLD STRIP.AUTO-MCNC: 252 MG/DL (ref 65–99)
GLUCOSE BLD STRIP.AUTO-MCNC: 279 MG/DL (ref 65–99)
GLUCOSE BLD STRIP.AUTO-MCNC: 294 MG/DL (ref 65–99)
GLUCOSE SERPL-MCNC: 297 MG/DL (ref 65–99)
HCT VFR BLD AUTO: 30.4 % (ref 42–52)
HGB BLD-MCNC: 9.7 G/DL (ref 14–18)
IMM GRANULOCYTES # BLD AUTO: 0.03 K/UL (ref 0–0.11)
IMM GRANULOCYTES NFR BLD AUTO: 0.4 % (ref 0–0.9)
LYMPHOCYTES # BLD AUTO: 0.97 K/UL (ref 1–4.8)
LYMPHOCYTES NFR BLD: 11.4 % (ref 22–41)
MAGNESIUM SERPL-MCNC: 2.1 MG/DL (ref 1.5–2.5)
MCH RBC QN AUTO: 30.5 PG (ref 27–33)
MCHC RBC AUTO-ENTMCNC: 31.9 G/DL (ref 32.3–36.5)
MCV RBC AUTO: 95.6 FL (ref 81.4–97.8)
MONOCYTES # BLD AUTO: 1.37 K/UL (ref 0–0.85)
MONOCYTES NFR BLD AUTO: 16.2 % (ref 0–13.4)
NEUTROPHILS # BLD AUTO: 6.01 K/UL (ref 1.82–7.42)
NEUTROPHILS NFR BLD: 70.8 % (ref 44–72)
NRBC # BLD AUTO: 0 K/UL
NRBC BLD-RTO: 0 /100 WBC (ref 0–0.2)
PHOSPHATE SERPL-MCNC: 2.7 MG/DL (ref 2.5–4.5)
PLATELET # BLD AUTO: 255 K/UL (ref 164–446)
PMV BLD AUTO: 9.4 FL (ref 9–12.9)
POTASSIUM SERPL-SCNC: 3.7 MMOL/L (ref 3.6–5.5)
PROT SERPL-MCNC: 6 G/DL (ref 6–8.2)
RBC # BLD AUTO: 3.18 M/UL (ref 4.7–6.1)
SODIUM SERPL-SCNC: 141 MMOL/L (ref 135–145)
WBC # BLD AUTO: 8.5 K/UL (ref 4.8–10.8)

## 2024-03-07 PROCEDURE — A9270 NON-COVERED ITEM OR SERVICE: HCPCS | Performed by: OTOLARYNGOLOGY

## 2024-03-07 PROCEDURE — 700101 HCHG RX REV CODE 250: Performed by: SURGERY

## 2024-03-07 PROCEDURE — 71045 X-RAY EXAM CHEST 1 VIEW: CPT

## 2024-03-07 PROCEDURE — A9270 NON-COVERED ITEM OR SERVICE: HCPCS | Performed by: SURGERY

## 2024-03-07 PROCEDURE — 97602 WOUND(S) CARE NON-SELECTIVE: CPT

## 2024-03-07 PROCEDURE — 94640 AIRWAY INHALATION TREATMENT: CPT

## 2024-03-07 PROCEDURE — 85025 COMPLETE CBC W/AUTO DIFF WBC: CPT

## 2024-03-07 PROCEDURE — 700102 HCHG RX REV CODE 250 W/ 637 OVERRIDE(OP): Performed by: SURGERY

## 2024-03-07 PROCEDURE — 700102 HCHG RX REV CODE 250 W/ 637 OVERRIDE(OP): Performed by: STUDENT IN AN ORGANIZED HEALTH CARE EDUCATION/TRAINING PROGRAM

## 2024-03-07 PROCEDURE — 770022 HCHG ROOM/CARE - ICU (200)

## 2024-03-07 PROCEDURE — 80053 COMPREHEN METABOLIC PANEL: CPT

## 2024-03-07 PROCEDURE — A9270 NON-COVERED ITEM OR SERVICE: HCPCS | Performed by: STUDENT IN AN ORGANIZED HEALTH CARE EDUCATION/TRAINING PROGRAM

## 2024-03-07 PROCEDURE — A9270 NON-COVERED ITEM OR SERVICE: HCPCS

## 2024-03-07 PROCEDURE — 94669 MECHANICAL CHEST WALL OSCILL: CPT

## 2024-03-07 PROCEDURE — 700102 HCHG RX REV CODE 250 W/ 637 OVERRIDE(OP)

## 2024-03-07 PROCEDURE — 700102 HCHG RX REV CODE 250 W/ 637 OVERRIDE(OP): Performed by: OTOLARYNGOLOGY

## 2024-03-07 PROCEDURE — 83735 ASSAY OF MAGNESIUM: CPT

## 2024-03-07 PROCEDURE — 700102 HCHG RX REV CODE 250 W/ 637 OVERRIDE(OP): Performed by: INTERNAL MEDICINE

## 2024-03-07 PROCEDURE — 84100 ASSAY OF PHOSPHORUS: CPT

## 2024-03-07 PROCEDURE — 82962 GLUCOSE BLOOD TEST: CPT

## 2024-03-07 PROCEDURE — 99233 SBSQ HOSP IP/OBS HIGH 50: CPT | Performed by: SURGERY

## 2024-03-07 PROCEDURE — A9270 NON-COVERED ITEM OR SERVICE: HCPCS | Performed by: INTERNAL MEDICINE

## 2024-03-07 RX ADMIN — CEPHALEXIN 500 MG: 250 FOR SUSPENSION ORAL at 05:39

## 2024-03-07 RX ADMIN — QUETIAPINE FUMARATE 50 MG: 25 TABLET ORAL at 21:07

## 2024-03-07 RX ADMIN — RIVAROXABAN 20 MG: 20 TABLET, FILM COATED ORAL at 17:26

## 2024-03-07 RX ADMIN — Medication 3 ML: at 10:52

## 2024-03-07 RX ADMIN — GLYCOPYRROLATE 1 MG: 1 TABLET ORAL at 17:26

## 2024-03-07 RX ADMIN — IPRATROPIUM BROMIDE AND ALBUTEROL SULFATE 3 ML: 2.5; .5 SOLUTION RESPIRATORY (INHALATION) at 14:22

## 2024-03-07 RX ADMIN — LABETALOL HYDROCHLORIDE 10 MG: 5 INJECTION, SOLUTION INTRAVENOUS at 16:01

## 2024-03-07 RX ADMIN — CEPHALEXIN 500 MG: 250 FOR SUSPENSION ORAL at 14:38

## 2024-03-07 RX ADMIN — LANSOPRAZOLE 30 MG: 30 TABLET, ORALLY DISINTEGRATING, DELAYED RELEASE ORAL at 05:33

## 2024-03-07 RX ADMIN — METOPROLOL TARTRATE 25 MG: 25 TABLET, FILM COATED ORAL at 17:26

## 2024-03-07 RX ADMIN — Medication 3 ML: at 19:07

## 2024-03-07 RX ADMIN — ASPIRIN 81 MG 81 MG: 81 TABLET ORAL at 05:33

## 2024-03-07 RX ADMIN — GABAPENTIN 300 MG: 300 CAPSULE ORAL at 05:32

## 2024-03-07 RX ADMIN — LEVOTHYROXINE SODIUM 75 MCG: 0.07 TABLET ORAL at 05:33

## 2024-03-07 RX ADMIN — Medication 3 ML: at 06:59

## 2024-03-07 RX ADMIN — GLYCOPYRROLATE 1 MG: 1 TABLET ORAL at 05:39

## 2024-03-07 RX ADMIN — CELECOXIB 200 MG: 200 CAPSULE ORAL at 05:33

## 2024-03-07 RX ADMIN — GLYCOPYRROLATE 1 MG: 1 TABLET ORAL at 13:07

## 2024-03-07 RX ADMIN — TERAZOSIN HYDROCHLORIDE 2 MG: 2 CAPSULE ORAL at 17:26

## 2024-03-07 RX ADMIN — LISINOPRIL 5 MG: 5 TABLET ORAL at 05:33

## 2024-03-07 RX ADMIN — IPRATROPIUM BROMIDE AND ALBUTEROL SULFATE 3 ML: 2.5; .5 SOLUTION RESPIRATORY (INHALATION) at 10:51

## 2024-03-07 RX ADMIN — Medication 3 ML: at 14:22

## 2024-03-07 RX ADMIN — IPRATROPIUM BROMIDE AND ALBUTEROL SULFATE 3 ML: 2.5; .5 SOLUTION RESPIRATORY (INHALATION) at 19:07

## 2024-03-07 RX ADMIN — CEPHALEXIN 500 MG: 250 FOR SUSPENSION ORAL at 21:08

## 2024-03-07 RX ADMIN — ROSUVASTATIN CALCIUM 20 MG: 20 TABLET, FILM COATED ORAL at 05:32

## 2024-03-07 RX ADMIN — GABAPENTIN 300 MG: 300 CAPSULE ORAL at 17:26

## 2024-03-07 RX ADMIN — METOPROLOL TARTRATE 25 MG: 25 TABLET, FILM COATED ORAL at 05:34

## 2024-03-07 RX ADMIN — INSULIN HUMAN 4 UNITS: 100 INJECTION, SUSPENSION SUBCUTANEOUS at 13:55

## 2024-03-07 ASSESSMENT — PAIN DESCRIPTION - PAIN TYPE
TYPE: ACUTE PAIN

## 2024-03-07 ASSESSMENT — CHA2DS2 SCORE
HYPERTENSION: YES
VASCULAR DISEASE: YES
DIABETES: YES
CHA2DS2 VASC SCORE: 6
PRIOR STROKE OR TIA OR THROMBOEMBOLISM: NO
CHF OR LEFT VENTRICULAR DYSFUNCTION: YES
SEX: MALE
AGE 75 OR GREATER: YES
AGE 65 TO 74: NO

## 2024-03-07 ASSESSMENT — FIBROSIS 4 INDEX: FIB4 SCORE: 2.16

## 2024-03-07 NOTE — CARE PLAN
The patient is Stable - Low risk of patient condition declining or worsening    Shift Goals  Clinical Goals: MD to downsize trach  Patient Goals: stay informed, trach comfort  Family Goals: updates    Progress made toward(s) clinical / shift goals:  Trach downsized, increased mobility    Problem: Pain - Standard  Goal: Alleviation of pain or a reduction in pain to the patient’s comfort goal  Outcome: Progressing       Patient is not progressing towards the following goals:

## 2024-03-07 NOTE — PROGRESS NOTES
"  DATE: 3/7/2024    Hospital Day 10  laryngeal injury after ground level fall .    INTERVAL EVENTS:  Tracheostomy changed to 5.0 uncuffed tube by ENT today. Blood sugars remain elevated despite high dose of basilar insulin, intermediate acting insulin added will monitor response.     REVIEW OF SYSTEMS:  Comprehensive review of systems is negative with the exception of the aforementioned HPI, PMH, and PSH bullets in accordance with CMS guidelines.    PHYSICAL EXAMINATION:  Vital Signs: /68   Pulse 88   Temp 35.9 °C (96.6 °F) (Temporal)   Resp (!) 41   Ht 1.854 m (6' 0.99\")   Wt (!) 132 kg (291 lb 7.2 oz)   SpO2 (!) 84%   Physical Exam  Vitals and nursing note reviewed.   Constitutional:       General: He is not in acute distress.     Appearance: He is not toxic-appearing.      Interventions: He is intubated.   HENT:      Head: Normocephalic and atraumatic.      Right Ear: External ear normal.      Left Ear: External ear normal.      Nose:      Comments: Nasogastric tube in place.     Mouth/Throat:      Mouth: Mucous membranes are moist.      Pharynx: Oropharynx is clear.   Eyes:      General: No scleral icterus.     Pupils: Pupils are equal, round, and reactive to light.   Neck:      Comments: Tracheostomy in place.  Cardiovascular:      Rate and Rhythm: Normal rate.   Pulmonary:      Effort: Pulmonary effort is normal. No respiratory distress. He is intubated.      Breath sounds: Rhonchi present.   Abdominal:      General: There is no distension.      Palpations: Abdomen is soft.      Tenderness: There is no abdominal tenderness. There is no guarding or rebound.   Musculoskeletal:         General: No tenderness. Normal range of motion.   Skin:     General: Skin is warm and dry.      Capillary Refill: Capillary refill takes less than 2 seconds.      Coloration: Skin is not jaundiced.   Neurological:      General: No focal deficit present.      Mental Status: He is alert.      GCS: GCS eye subscore is 4. " GCS motor subscore is 6.   Psychiatric:         Behavior: Behavior is cooperative.         LABORATORY VALUES:  Recent Labs     03/05/24  0620 03/06/24  0430 03/07/24  0350   WBC 7.8 7.0 8.5   RBC 3.33* 3.46* 3.18*   HEMOGLOBIN 10.2* 10.6* 9.7*   HEMATOCRIT 31.5* 32.4* 30.4*   MCV 94.6 93.6 95.6   MCH 30.6 30.6 30.5   MCHC 32.4 32.7 31.9*   RDW 45.9 45.7 46.6   PLATELETCT 205 232 255   MPV 9.9 9.7 9.4     Recent Labs     03/05/24  0620 03/06/24  0430 03/07/24  0350   SODIUM 141 141 141   POTASSIUM 4.2 3.8 3.7   CHLORIDE 105 105 104   CO2 25 24 27   GLUCOSE 235* 292* 297*   BUN 31* 25* 24*   CREATININE 0.71 0.65 0.61   CALCIUM 8.5 8.6 8.7     Recent Labs     03/05/24 0620 03/06/24  0430 03/07/24  0350   ASTSGOT 554* 209* 122*   ALTSGPT 469* 350* 275*   TBILIRUBIN 0.8 0.7 0.6   ALKPHOSPHAT 168* 162* 156*   GLOBULIN 3.0 3.2 3.1           IMAGING:  DX-CHEST-PORTABLE (1 VIEW)   Final Result      Stable mild CHF pattern      DX-CHEST-LIMITED (1 VIEW)   Final Result      1.  Tracheostomy tube extends into the trachea.      2.  Right lower lobe atelectasis.      DX-CHEST-PORTABLE (1 VIEW)   Final Result      Stable mild CHF pattern. Decrease neck soft tissue air.      DX-ABDOMEN FOR TUBE PLACEMENT   Final Result      Enteric tube has been placed and the tip projects over the stomach.      DX-ABDOMEN FOR TUBE PLACEMENT   Final Result      Dobbhoff tube tip is coiled in the proximal stomach. Its tip is in the gastric fundus.      DX-CHEST-PORTABLE (1 VIEW)   Final Result      Stable mild CHF pattern. No change.      DX-ABDOMEN FOR TUBE PLACEMENT   Final Result      Feeding tube with distal tip overlying antrum of stomach.      DX-CHEST-PORTABLE (1 VIEW)   Final Result      No pneumothorax following chest tube removal. Improving neck soft tissue emphysema. Otherwise, stable.         DX-CHEST-PORTABLE (1 VIEW)   Final Result      No pneumothorax. Otherwise, stable.      DX-CHEST-PORTABLE (1 VIEW)   Final Result      Tiny, 4 mm  left pneumothorax. Otherwise, stable.      DX-CHEST-PORTABLE (1 VIEW)   Final Result      1.  Supportive tubing as described above.   2.  Worsening bibasilar infiltrate or atelectasis, particularly on the LEFT.   3.  No pneumothorax.      US-CAROTID DOPPLER BILAT   Final Result      DX-CHEST-PORTABLE (1 VIEW)   Final Result      Mildly worsening aeration with no other significant change.      DX-CHEST-PORTABLE (1 VIEW)   Final Result         1. No definite pneumomediastinum. No pneumothorax.      2. Neck soft tissue emphysema is redemonstrated.      EC-ECHOCARDIOGRAM COMPLETE W/O CONT   Final Result      DX-ABDOMEN FOR TUBE PLACEMENT   Final Result      The tip of the esophagogastric tube terminates over the fundus of the stomach.      DX-CHEST-PORTABLE (1 VIEW)   Final Result      Stable exam. Large amount of soft tissue emphysema and pneumomediastinum redemonstrated.      CT-SOFT TISSUE NECK WITH   Final Result         1. There is fracture of the thyroid cartilage, along with a suspected injury to the cricoid cartilage. Soft tissue density surrounds the ET tube within the larynx and injury is suspected.   2. Subcutaneous emphysema throughout the neck extending inferiorly. Pneumomediastinum      DX-CHEST-LIMITED (1 VIEW)   Final Result         1.  Pneumomediastinum. Soft tissue gas in the lower neck.   2.  Small pneumothorax on the left is suspected. Left chest tube is in place.      US-ABORTED US PROCEDURE    (Results Pending)       ASSESSMENT AND PLAN:  Elevated troponin- (present on admission)  Assessment & Plan  Admission troponin level 354.  EKG with SR without acute changes.  3/3 Cardiology recommending Heparin gtt, ASA 81, and Xarelto when feasible.  Secretions from trach are no longer bloody.  Heparin gtt and ASA started today  Otis Ni MD, Cardiology    Fracture closed, thyroid cartilage, initial encounter (HCC)- (present on admission)  Assessment & Plan  CT at [facility] showing mildly displaced fracture of  the left para midline aspect of the thyroid cartilage.  Bronchoscopy in ICU without evidence of tracheobronchial injury distal to the endotracheal tube however bloody sections noted proximally.  CT neck with thyroid cartilage fracture & suspect injury to cricoid cartilage.  3/2 Tracheostomy placement. Direct laryngoscopy and tracheoscopy.  Flaco Contreras MD. [facility].    Pneumomediastinum (HCC)- (present on admission)  Assessment & Plan  Extensive gas is noted within the superficial and deep soft tissue planes of the upper chest and neck.  Opacification of a 4 cm segment of the trachea, distal to the cords, surrounding the endotracheal tube is present, and could represent a manifestation of tracheal injury.  CT neck with subcutaneous emphysema & pneumomediastinum.  ENT and GI consulted for concern for tracheal/esophageal injuries.  Flaco Contreras MD. [facility].  Don Armstrong MD. [facility].    Respiratory failure following trauma (HCC)- (present on admission)  Assessment & Plan  Intubated at [facility].  3/1 Endoscopic evaluation of airway and trach placement today with ENT  3/2  Switch to trach collar, tolerating  3/3 Tolerating t piece.  3/6 Downsized to 6 noncuffed by ENT.  Keflex initiated for purulent drainage, 7 days.    Tracheostomy to be managed by ENT.  Not to be decannulated this admission.  Can follow up with ENT as an outpatient for decannulation when appropriate    Fever  Assessment & Plan  Temperature 100-101 F, responding to tylenol.  Signficant secretion burden, likely from this, no clearly purulent, normal wbc count.  Continue to monitor and treat with tylenol for now.    Syncope and collapse- (present on admission)  Assessment & Plan  EKG sinus rhythm & left anterior fascicular block.  Trend troponin.   Echocardiogram with LVEF 50%.  Continuous cardiac monitoring  3/1 Some ectopy overnight self limited and seems to be asymptomatic,  reviewed electrolytes WNL    Pneumothorax on left- (present on admission)  Assessment & Plan  Left chest tube placed by [facility].  2/29 Water seal chest tube.  3/1 Will keep chest tube until definitive management of airway  3/2 CXR in AM, if trace pneumothorax is stable, remove chest tube  3/3 CXR without pneumo, chest tube removal.  Serial CXRs    History of chronic CHF- (present on admission)  Assessment & Plan  Per chart review.   History of dilated cardiomyopathy  2/28 Echocardiogram with LVEF 50%.    Diabetes (HCC)- (present on admission)  Assessment & Plan  Chronic condition treated with Insulin and metformin.  Holding maintenance metformin for 48 hours following intravenous contrast administration.  Insulin sliding scale coverage.  3/1 15 u lantus resumed.   3/2 lantus increase to 25 u  3/3 Start insulin gtt  3/5 Insulin gtt stopped.  3/6 Basilar insulin increased to 35 units qAM.    A-fib (HCC)- (present on admission)  Assessment & Plan  Chronic condition treated with Xarelto and Toprol Xl  EKG sinus rhythm.  Toprol XL resumed on admit.  Systemic anticoagulation held on admission.    GERD (gastroesophageal reflux disease)- (present on admission)  Assessment & Plan  Chronic condition treated with omeprazole.  Holding maintenance medication during acute traumatic illness.  Lansoprasole via enteral tube.    Urinary retention- (present on admission)  Assessment & Plan  Chronic condition treated with tamsulosin.  Holding maintenance medication during acute traumatic illness. Consider initiation when tolerating PO.    Primary hypertension- (present on admission)  Assessment & Plan  Chronic condition treated with lisinopril.  3/6 Resumed maintenance medication.    Hypothyroid- (present on admission)  Assessment & Plan  Chronic condition treated with levothyroxine.  3/6 Resumed maintenance medication.    No contraindication to deep vein thrombosis (DVT) prophylaxis- (present on admission)  Assessment & Plan  2/29  Prophylactic dose enoxaparin 40 mg BID initiated.     Trauma- (present on admission)  Assessment & Plan  Walked into gas station stated he didn't feel well then had syncopal episode.  Trauma Red Transfer Activation from [facility] in [place], [place].  Taz Ward MD. Trauma Surgery.    High cholesterol- (present on admission)  Assessment & Plan  Chronic condition treated with Crestor.  Resumed maintenance medication on admission.      The patient remains critically injured with  laryngeal injury .  The patient was seen and examined on rounds and discussed with the multidisciplinary critical care team and consulting physicians. Critically evaluated laboratory tests, culture data, medications, imaging, and other diagnostic tests.    The patient has acute impairment of one or more vital organ systems and a high probability of imminent or life-threatening deterioration in condition. Provided high complexity decision making to assess, manipulate, and support vital system functions to treat vital organ system failure and/or to prevent further life-threatening deterioration of the patient's condition. Requires continued ICU and hospital admission.     ____________________________________     Taz Ward M.D.    DD: 3/7/2024  2:22 PM

## 2024-03-07 NOTE — PROCEDURES
On call MD called to bedside. Nursing and RT unable to pass flexible suction and pt began to desaturate.    Flex scope used to look through trach, and distal end was in a false passage, or against the wall of the trachea. Trach removed, obturator placed and trach reinserted into the trachea. Scope confirmed location. Collar velcro placed tightly. Sats returned to 90s from low 80s.

## 2024-03-07 NOTE — CARE PLAN
The patient is Watcher - Medium risk of patient condition declining or worsening    Shift Goals  Clinical Goals: hemodynamic stability SBP <180, stable neuro exams, improved oxygenation, trache care  Patient Goals: stay informed, trach comfort, sleep  Family Goals: updates    Progress made toward(s) clinical / shift goals:    Problem: Knowledge Deficit - Standard  Goal: Patient and family/care givers will demonstrate understanding of plan of care, disease process/condition, diagnostic tests and medications  Outcome: Progressing     Problem: Fall Risk  Goal: Patient will remain free from falls  Outcome: Progressing     Problem: Hemodynamics  Goal: Patient's hemodynamics, fluid balance and neurologic status will be stable or improve  Outcome: Progressing     Problem: Respiratory  Goal: Patient will achieve/maintain optimum respiratory ventilation and gas exchange  Outcome: Progressing     Problem: Mechanical Ventilation  Goal: Successful weaning off mechanical ventilator, spontaneously maintains adequate gas exchange  Outcome: Progressing  Goal: Patient will be able to express needs and understand communication  Outcome: Progressing     Problem: Pain - Standard  Goal: Alleviation of pain or a reduction in pain to the patient’s comfort goal  Outcome: Progressing       Patient is not progressing towards the following goals:

## 2024-03-07 NOTE — PROGRESS NOTES
2105 Dr. Pace at bedside for assessment.   2110 Trach change and scope in progress.   2113 Procedure completed and documented in procedure navigator.

## 2024-03-07 NOTE — RESPIRATORY CARE
Trach got dislodged for the second time after attempting to suction the patient. Patient started to desat to the low 80's and was placed on NRB. RN was notified. Dr. Pace came to bedside to instruct me RRT Annette of how to place back the trach in case the trach was to get dislodged again. XLT traches were ordered per ENT but was not ready by the time ENT showed up. Per ENT, remove trach dressing for time being and keep trach ties tight.  If the trach was to get dislodged again any RRT can replace the 6.0 cuffless trach to a XLT 7.0 Shiley cuffless trach which is at bedside.

## 2024-03-07 NOTE — PROGRESS NOTES
"S: Doing well but trach tube came out twice last night.     O: /68   Pulse 73   Temp 35.9 °C (96.6 °F) (Temporal)   Resp (!) 46   Ht 1.854 m (6' 0.99\")   Wt (!) 132 kg (291 lb 7.2 oz)   SpO2 94%   Recent Labs     03/05/24  0620 03/06/24  0430 03/07/24  0350   WBC 7.8 7.0 8.5   RBC 3.33* 3.46* 3.18*   HEMOGLOBIN 10.2* 10.6* 9.7*   HEMATOCRIT 31.5* 32.4* 30.4*   MCV 94.6 93.6 95.6   MCH 30.6 30.6 30.5   MCHC 32.4 32.7 31.9*   RDW 45.9 45.7 46.6   PLATELETCT 205 232 255   MPV 9.9 9.7 9.4     Recent Labs     03/05/24  0620 03/06/24  0430 03/07/24  0350   SODIUM 141 141 141   POTASSIUM 4.2 3.8 3.7   CHLORIDE 105 105 104   CO2 25 24 27   GLUCOSE 235* 292* 297*   BUN 31* 25* 24*   CREATININE 0.71 0.65 0.61   CALCIUM 8.5 8.6 8.7     I/O last 3 completed shifts:  In: 3570 [I.V.:1195; Other:210; NG/GT:2015]  Out: 2295 [Urine:2295]  Trach site better-replaced trach tube with 5 extra long Shiley with optifoam.      A: POD 6-replaced trach with 5 xl Shiley.    P:  Breathes well with tube plugged on no air in neck. OK to place speaking valve.   "

## 2024-03-07 NOTE — PROGRESS NOTES
2035: LVM for Dr. Pace regarding inability to pass flexible suction through patients trach again. RRT at bedside with this RN. Pt complains of increased difficulty in breathing, sating 81-83%. Pt placed on non-rebreather with improved saturation at 99%. Pending return call from Dr. Pace at this time.     2040: Dr. Pace coming to bedside.

## 2024-03-07 NOTE — PROGRESS NOTES
"  DATE: 3/6/2024    Hospital Day 9  laryngeal injury after ground level fall .    INTERVAL EVENTS:  Tracheostomy down-sized to a 6.0 uncuffed tube today by ENT. Keflex started for 7-days per ENT for drainage adjacent to trach. Plan to transition from heparin drip back to Xarelto tonight. Secretions improving. Basilar insulin increased to 35 units qAM today, will monitor blood sugar.    REVIEW OF SYSTEMS:  Comprehensive review of systems is negative with the exception of the aforementioned HPI, PMH, and PSH bullets in accordance with CMS guidelines.    PHYSICAL EXAMINATION:  Vital Signs: BP (!) 162/71   Pulse 93   Temp 36.7 °C (98 °F) (Temporal)   Resp (!) 30   Ht 1.854 m (6' 0.99\")   Wt (!) 133 kg (292 lb 15.9 oz)   SpO2 91%   Physical Exam  Vitals and nursing note reviewed.   Constitutional:       General: He is not in acute distress.     Appearance: He is not toxic-appearing.      Interventions: He is intubated.   HENT:      Head: Normocephalic and atraumatic.      Right Ear: External ear normal.      Left Ear: External ear normal.      Nose:      Comments: Nasogastric tube in place.     Mouth/Throat:      Mouth: Mucous membranes are moist.      Pharynx: Oropharynx is clear.   Eyes:      General: No scleral icterus.     Pupils: Pupils are equal, round, and reactive to light.   Neck:      Comments: Tracheostomy in place, mild adjacent dried blood. Subcutaneous emphysema in the neck resolving.  Cardiovascular:      Rate and Rhythm: Normal rate and regular rhythm.   Pulmonary:      Effort: Pulmonary effort is normal. No respiratory distress. He is intubated.      Breath sounds: Rhonchi present.   Abdominal:      General: There is no distension.      Palpations: Abdomen is soft.      Tenderness: There is no abdominal tenderness. There is no guarding or rebound.   Skin:     General: Skin is warm and dry.      Capillary Refill: Capillary refill takes less than 2 seconds.      Coloration: Skin is not jaundiced. "   Neurological:      Mental Status: He is alert.      GCS: GCS eye subscore is 4. GCS verbal subscore is 1. GCS motor subscore is 6.      Comments: GCS 11T.  Hard of hearing.         LABORATORY VALUES:  Recent Labs     03/04/24  0435 03/05/24  0620 03/06/24  0430   WBC 7.4 7.8 7.0   RBC 3.57* 3.33* 3.46*   HEMOGLOBIN 11.1* 10.2* 10.6*   HEMATOCRIT 33.5* 31.5* 32.4*   MCV 93.8 94.6 93.6   MCH 31.1 30.6 30.6   MCHC 33.1 32.4 32.7   RDW 45.3 45.9 45.7   PLATELETCT 214 205 232   MPV 9.8 9.9 9.7     Recent Labs     03/05/24  0020 03/05/24  0620 03/06/24  0430   SODIUM 144 141 141   POTASSIUM 3.8 4.2 3.8   CHLORIDE 109 105 105   CO2 26 25 24   GLUCOSE 86 235* 292*   BUN 30* 31* 25*   CREATININE 0.67 0.71 0.65   CALCIUM 8.7 8.5 8.6     Recent Labs     03/04/24  0850 03/05/24  0620 03/06/24  0430   ASTSGOT 449* 554* 209*   ALTSGPT 270* 469* 350*   TBILIRUBIN 0.4 0.8 0.7   ALKPHOSPHAT 124* 168* 162*   GLOBULIN 3.1 3.0 3.2           IMAGING:  DX-CHEST-PORTABLE (1 VIEW)   Final Result      Stable mild CHF pattern. Decrease neck soft tissue air.      DX-ABDOMEN FOR TUBE PLACEMENT   Final Result      Enteric tube has been placed and the tip projects over the stomach.      DX-ABDOMEN FOR TUBE PLACEMENT   Final Result      Dobbhoff tube tip is coiled in the proximal stomach. Its tip is in the gastric fundus.      DX-CHEST-PORTABLE (1 VIEW)   Final Result      Stable mild CHF pattern. No change.      DX-ABDOMEN FOR TUBE PLACEMENT   Final Result      Feeding tube with distal tip overlying antrum of stomach.      DX-CHEST-PORTABLE (1 VIEW)   Final Result      No pneumothorax following chest tube removal. Improving neck soft tissue emphysema. Otherwise, stable.         DX-CHEST-PORTABLE (1 VIEW)   Final Result      No pneumothorax. Otherwise, stable.      DX-CHEST-PORTABLE (1 VIEW)   Final Result      Tiny, 4 mm left pneumothorax. Otherwise, stable.      DX-CHEST-PORTABLE (1 VIEW)   Final Result      1.  Supportive tubing as described  above.   2.  Worsening bibasilar infiltrate or atelectasis, particularly on the LEFT.   3.  No pneumothorax.      US-CAROTID DOPPLER BILAT   Final Result      DX-CHEST-PORTABLE (1 VIEW)   Final Result      Mildly worsening aeration with no other significant change.      DX-CHEST-PORTABLE (1 VIEW)   Final Result         1. No definite pneumomediastinum. No pneumothorax.      2. Neck soft tissue emphysema is redemonstrated.      EC-ECHOCARDIOGRAM COMPLETE W/O CONT   Final Result      DX-ABDOMEN FOR TUBE PLACEMENT   Final Result      The tip of the esophagogastric tube terminates over the fundus of the stomach.      DX-CHEST-PORTABLE (1 VIEW)   Final Result      Stable exam. Large amount of soft tissue emphysema and pneumomediastinum redemonstrated.      CT-SOFT TISSUE NECK WITH   Final Result         1. There is fracture of the thyroid cartilage, along with a suspected injury to the cricoid cartilage. Soft tissue density surrounds the ET tube within the larynx and injury is suspected.   2. Subcutaneous emphysema throughout the neck extending inferiorly. Pneumomediastinum      DX-CHEST-LIMITED (1 VIEW)   Final Result         1.  Pneumomediastinum. Soft tissue gas in the lower neck.   2.  Small pneumothorax on the left is suspected. Left chest tube is in place.      US-ABORTED US PROCEDURE    (Results Pending)       ASSESSMENT AND PLAN:  Elevated troponin- (present on admission)  Assessment & Plan  Admission troponin level 354.  EKG with SR without acute changes.  3/3 Cardiology recommending Heparin gtt, ASA 81, and Xarelto when feasible.  Secretions from trach are no longer bloody.  Heparin gtt and ASA started today  Otis MD Last, Cardiology    Fracture closed, thyroid cartilage, initial encounter ([facility])- (present on admission)  Assessment & Plan  CT at [place] showing mildly displaced fracture of the left para midline aspect of the thyroid cartilage.  Bronchoscopy in ICU without evidence of tracheobronchial injury distal  to the endotracheal tube however bloody sections noted proximally.  CT neck with thyroid cartilage fracture & suspect injury to cricoid cartilage.  3/2 Tracheostomy placement. Direct laryngoscopy and tracheoscopy.  Flaco Contreras MD. [facility].    Pneumomediastinum (HCC)- (present on admission)  Assessment & Plan  Extensive gas is noted within the superficial and deep soft tissue planes of the upper chest and neck.  Opacification of a 4 cm segment of the trachea, distal to the cords, surrounding the endotracheal tube is present, and could represent a manifestation of tracheal injury.  CT neck with subcutaneous emphysema & pneumomediastinum.  ENT and GI consulted for concern for tracheal/esophageal injuries.  Flaco Contreras MD. [facility].  Don Armstrong MD. [facility].    Respiratory failure following trauma (HCC)- (present on admission)  Assessment & Plan  Intubated at [place].  3/1 Endoscopic evaluation of airway and trach placement today with ENT  3/2  Switch to trach collar, tolerating  3/3 Tolerating t piece.  3/6 Downsized to 6 noncuffed by ENT.  Keflex initiated for purulent drainage, 7 days.    Tracheostomy to be managed by ENT.  Not to be decannulated this admission.  Can follow up with ENT as an outpatient for decannulation when appropriate    Fever  Assessment & Plan  Temperature 100-101 F, responding to tylenol.  Signficant secretion burden, likely from this, no clearly purulent, normal wbc count.  Continue to monitor and treat with tylenol for now.    Syncope and collapse- (present on admission)  Assessment & Plan  EKG sinus rhythm & left anterior fascicular block.  Trend troponin.   Echocardiogram with LVEF 50%.  Continuous cardiac monitoring  3/1 Some ectopy overnight self limited and seems to be asymptomatic, reviewed electrolytes WNL    Pneumothorax on left- (present on admission)  Assessment & Plan  Left chest tube placed by  [place].  2/29 Water seal chest tube.  3/1 Will keep chest tube until definitive management of airway  3/2 CXR in AM, if trace pneumothorax is stable, remove chest tube  3/3 CXR without pneumo, chest tube removal.  Serial CXRs    History of chronic CHF- (present on admission)  Assessment & Plan  Per chart review.   History of dilated cardiomyopathy  2/28 Echocardiogram with LVEF 50%.    Diabetes (HCC)- (present on admission)  Assessment & Plan  Chronic condition treated with Insulin and metformin.  Holding maintenance metformin for 48 hours following intravenous contrast administration.  Insulin sliding scale coverage.  3/1 15 u lantus resumed.   3/2 lantus increase to 25 u  3/3 Start insulin gtt  3/5 Insulin gtt stopped.  3/6 Basilar insulin increased to 35 units qAM.    A-fib (HCC)- (present on admission)  Assessment & Plan  Chronic condition treated with Xarelto and Toprol Xl  EKG sinus rhythm.  Toprol XL resumed on admit.  Systemic anticoagulation held on admission.    GERD (gastroesophageal reflux disease)- (present on admission)  Assessment & Plan  Chronic condition treated with omeprazole.  Holding maintenance medication during acute traumatic illness.  Lansoprasole via enteral tube.    Urinary retention- (present on admission)  Assessment & Plan  Chronic condition treated with tamsulosin.  Holding maintenance medication during acute traumatic illness. Consider initiation when tolerating PO.    Primary hypertension- (present on admission)  Assessment & Plan  Chronic condition treated with lisinopril.  3/6 Resumed maintenance medication.    Hypothyroid- (present on admission)  Assessment & Plan  Chronic condition treated with levothyroxine.  3/6 Resumed maintenance medication.    No contraindication to deep vein thrombosis (DVT) prophylaxis- (present on admission)  Assessment & Plan  2/29 Prophylactic dose enoxaparin 40 mg BID initiated.     Trauma- (present on admission)  Assessment & Plan  Walked into gas  station stated he didn't feel well then had syncopal episode.  Trauma Red Transfer Activation from [facility] in [place], [place].  Taz Ward MD. Trauma Surgery.    High cholesterol- (present on admission)  Assessment & Plan  Chronic condition treated with Crestor.  Resumed maintenance medication on admission.      The patient remains critically injured with acute respiratory failure.  The patient was seen and examined on rounds and discussed with the multidisciplinary critical care team and consulting physicians. Critically evaluated laboratory tests, culture data, medications, imaging, and other diagnostic tests.    The patient has acute impairment of one or more vital organ systems and a high probability of imminent or life-threatening deterioration in condition. Provided high complexity decision making to assess, manipulate, and support vital system functions to treat vital organ system failure and/or to prevent further life-threatening deterioration of the patient's condition. Requires continued ICU and hospital admission.       ____________________________________     Taz Ward M.D.    DD: 3/6/2024  4:09 PM

## 2024-03-07 NOTE — PROGRESS NOTES
RT notified this RN that they are unable to pass suction through patients trach. This RN contact Dr. Dejesus and ordered a STAT Chx-XRAY to confirm placement. This RN also left a message for the ENT on-call updating them on the patients trach suction status. Pt is is no respiratory distress, has a strong productive cough. Pt is alert and following commands. Pending return call.      1955: Dr. Tomlinson called back. Advised to continue to monitor patient clinically and notify him of any changes or patient distress that occur. This RN updated RRT and charge of clinical plan.

## 2024-03-07 NOTE — RESPIRATORY CARE
Patient was seen by RRT Annette during 1st rounds. Pt was seen with trach collar with a 6.0 cuffless trach. Pt was switched over to Tpiece to pass suction. During suctioning, catheter could not fully pass through. Pt is in no respiratory distress, has a strong productive cough, alert, and following commands. Oxygen was increased to 15L/70%. Nurse and MD is aware.

## 2024-03-07 NOTE — PROGRESS NOTES
Trach dislodged again. I requested an XLT trach incase it was too short, but trach not arrived by the time I did. Trach replaced with RT, and they were told that they can replace it if it becomes dislodged again this evening. They can also replace it with an XLT-D.

## 2024-03-07 NOTE — CARE PLAN
Problem: Aerosol Therapy  Goal: Improved hydration/ability to mobilize secretions and/or decreased airway edema  Description: Target End Date:  resolve prior to discharge or when underlying condition is resolved/stabilized    1.  Implement heated or cool aerosol therapy  2.  Assessed for optimal hydration, decreased edema and/or improved ability to mobilize secretions  Outcome: Not Met     Problem: Bronchopulmonary Hygiene  Goal: Increase mobilization of retained secretions  Description: Target End Date:  2 to 3 days    1.  Perform bronchopulmonary therapy as indicated by assessment  2.  Perform airway suctioning  3.  Perform actions to maintain patient airway  Outcome: Not Met       Tpiece 15/50%  6.0 cuffless

## 2024-03-08 ENCOUNTER — APPOINTMENT (OUTPATIENT)
Dept: RADIOLOGY | Facility: MEDICAL CENTER | Age: 74
DRG: 004 | End: 2024-03-08
Attending: SURGERY
Payer: COMMERCIAL

## 2024-03-08 LAB
ALBUMIN SERPL BCP-MCNC: 3 G/DL (ref 3.2–4.9)
ALBUMIN SERPL BCP-MCNC: 3.2 G/DL (ref 3.2–4.9)
ALBUMIN/GLOB SERPL: 0.9 G/DL
ALBUMIN/GLOB SERPL: 0.9 G/DL
ALP SERPL-CCNC: 156 U/L (ref 30–99)
ALP SERPL-CCNC: 170 U/L (ref 30–99)
ALT SERPL-CCNC: 234 U/L (ref 2–50)
ALT SERPL-CCNC: 275 U/L (ref 2–50)
ANION GAP SERPL CALC-SCNC: 12 MMOL/L (ref 7–16)
ANION GAP SERPL CALC-SCNC: 13 MMOL/L (ref 7–16)
AST SERPL-CCNC: 135 U/L (ref 12–45)
AST SERPL-CCNC: 151 U/L (ref 12–45)
BASOPHILS # BLD AUTO: 0.3 % (ref 0–1.8)
BASOPHILS # BLD: 0.03 K/UL (ref 0–0.12)
BILIRUB SERPL-MCNC: 0.5 MG/DL (ref 0.1–1.5)
BILIRUB SERPL-MCNC: 0.6 MG/DL (ref 0.1–1.5)
BUN SERPL-MCNC: 24 MG/DL (ref 8–22)
BUN SERPL-MCNC: 25 MG/DL (ref 8–22)
CALCIUM ALBUM COR SERPL-MCNC: 9.5 MG/DL (ref 8.5–10.5)
CALCIUM ALBUM COR SERPL-MCNC: 9.6 MG/DL (ref 8.5–10.5)
CALCIUM SERPL-MCNC: 8.7 MG/DL (ref 8.5–10.5)
CALCIUM SERPL-MCNC: 9 MG/DL (ref 8.5–10.5)
CHLORIDE SERPL-SCNC: 102 MMOL/L (ref 96–112)
CHLORIDE SERPL-SCNC: 105 MMOL/L (ref 96–112)
CO2 SERPL-SCNC: 24 MMOL/L (ref 20–33)
CO2 SERPL-SCNC: 27 MMOL/L (ref 20–33)
CREAT SERPL-MCNC: 0.45 MG/DL (ref 0.5–1.4)
CREAT SERPL-MCNC: 0.54 MG/DL (ref 0.5–1.4)
EKG IMPRESSION: NORMAL
EOSINOPHIL # BLD AUTO: 0.2 K/UL (ref 0–0.51)
EOSINOPHIL NFR BLD: 2.3 % (ref 0–6.9)
ERYTHROCYTE [DISTWIDTH] IN BLOOD BY AUTOMATED COUNT: 45.6 FL (ref 35.9–50)
GFR SERPLBLD CREATININE-BSD FMLA CKD-EPI: 104 ML/MIN/1.73 M 2
GFR SERPLBLD CREATININE-BSD FMLA CKD-EPI: 110 ML/MIN/1.73 M 2
GLOBULIN SER CALC-MCNC: 3.2 G/DL (ref 1.9–3.5)
GLOBULIN SER CALC-MCNC: 3.5 G/DL (ref 1.9–3.5)
GLUCOSE BLD STRIP.AUTO-MCNC: 189 MG/DL (ref 65–99)
GLUCOSE BLD STRIP.AUTO-MCNC: 212 MG/DL (ref 65–99)
GLUCOSE BLD STRIP.AUTO-MCNC: 285 MG/DL (ref 65–99)
GLUCOSE BLD STRIP.AUTO-MCNC: 300 MG/DL (ref 65–99)
GLUCOSE SERPL-MCNC: 244 MG/DL (ref 65–99)
GLUCOSE SERPL-MCNC: 309 MG/DL (ref 65–99)
HCT VFR BLD AUTO: 31.3 % (ref 42–52)
HGB BLD-MCNC: 10.3 G/DL (ref 14–18)
IMM GRANULOCYTES # BLD AUTO: 0.03 K/UL (ref 0–0.11)
IMM GRANULOCYTES NFR BLD AUTO: 0.3 % (ref 0–0.9)
LYMPHOCYTES # BLD AUTO: 1.18 K/UL (ref 1–4.8)
LYMPHOCYTES NFR BLD: 13.5 % (ref 22–41)
MCH RBC QN AUTO: 31 PG (ref 27–33)
MCHC RBC AUTO-ENTMCNC: 32.9 G/DL (ref 32.3–36.5)
MCV RBC AUTO: 94.3 FL (ref 81.4–97.8)
MONOCYTES # BLD AUTO: 0.96 K/UL (ref 0–0.85)
MONOCYTES NFR BLD AUTO: 11 % (ref 0–13.4)
NEUTROPHILS # BLD AUTO: 6.35 K/UL (ref 1.82–7.42)
NEUTROPHILS NFR BLD: 72.6 % (ref 44–72)
NRBC # BLD AUTO: 0 K/UL
NRBC BLD-RTO: 0 /100 WBC (ref 0–0.2)
PLATELET # BLD AUTO: 254 K/UL (ref 164–446)
PMV BLD AUTO: 9.7 FL (ref 9–12.9)
POTASSIUM SERPL-SCNC: 3.7 MMOL/L (ref 3.6–5.5)
POTASSIUM SERPL-SCNC: 4.1 MMOL/L (ref 3.6–5.5)
PROT SERPL-MCNC: 6.2 G/DL (ref 6–8.2)
PROT SERPL-MCNC: 6.7 G/DL (ref 6–8.2)
RBC # BLD AUTO: 3.32 M/UL (ref 4.7–6.1)
SODIUM SERPL-SCNC: 139 MMOL/L (ref 135–145)
SODIUM SERPL-SCNC: 144 MMOL/L (ref 135–145)
WBC # BLD AUTO: 8.8 K/UL (ref 4.8–10.8)

## 2024-03-08 PROCEDURE — 94640 AIRWAY INHALATION TREATMENT: CPT

## 2024-03-08 PROCEDURE — A9270 NON-COVERED ITEM OR SERVICE: HCPCS | Performed by: NURSE PRACTITIONER

## 2024-03-08 PROCEDURE — 700102 HCHG RX REV CODE 250 W/ 637 OVERRIDE(OP): Performed by: STUDENT IN AN ORGANIZED HEALTH CARE EDUCATION/TRAINING PROGRAM

## 2024-03-08 PROCEDURE — 85025 COMPLETE CBC W/AUTO DIFF WBC: CPT

## 2024-03-08 PROCEDURE — 80053 COMPREHEN METABOLIC PANEL: CPT | Mod: 91

## 2024-03-08 PROCEDURE — 700102 HCHG RX REV CODE 250 W/ 637 OVERRIDE(OP): Performed by: NURSE PRACTITIONER

## 2024-03-08 PROCEDURE — A9270 NON-COVERED ITEM OR SERVICE: HCPCS | Performed by: SURGERY

## 2024-03-08 PROCEDURE — A9270 NON-COVERED ITEM OR SERVICE: HCPCS | Performed by: STUDENT IN AN ORGANIZED HEALTH CARE EDUCATION/TRAINING PROGRAM

## 2024-03-08 PROCEDURE — A9270 NON-COVERED ITEM OR SERVICE: HCPCS | Performed by: OTOLARYNGOLOGY

## 2024-03-08 PROCEDURE — 700102 HCHG RX REV CODE 250 W/ 637 OVERRIDE(OP): Performed by: SURGERY

## 2024-03-08 PROCEDURE — 700102 HCHG RX REV CODE 250 W/ 637 OVERRIDE(OP): Performed by: INTERNAL MEDICINE

## 2024-03-08 PROCEDURE — A9270 NON-COVERED ITEM OR SERVICE: HCPCS

## 2024-03-08 PROCEDURE — 770001 HCHG ROOM/CARE - MED/SURG/GYN PRIV*

## 2024-03-08 PROCEDURE — 700101 HCHG RX REV CODE 250: Performed by: SURGERY

## 2024-03-08 PROCEDURE — 92597 ORAL SPEECH DEVICE EVAL: CPT

## 2024-03-08 PROCEDURE — 700102 HCHG RX REV CODE 250 W/ 637 OVERRIDE(OP): Performed by: OTOLARYNGOLOGY

## 2024-03-08 PROCEDURE — 99232 SBSQ HOSP IP/OBS MODERATE 35: CPT | Performed by: SURGERY

## 2024-03-08 PROCEDURE — 700102 HCHG RX REV CODE 250 W/ 637 OVERRIDE(OP)

## 2024-03-08 PROCEDURE — 82962 GLUCOSE BLOOD TEST: CPT | Mod: 91

## 2024-03-08 PROCEDURE — 71045 X-RAY EXAM CHEST 1 VIEW: CPT

## 2024-03-08 PROCEDURE — 93010 ELECTROCARDIOGRAM REPORT: CPT | Performed by: INTERNAL MEDICINE

## 2024-03-08 PROCEDURE — 93005 ELECTROCARDIOGRAM TRACING: CPT | Performed by: SURGERY

## 2024-03-08 PROCEDURE — L8501 TRACHEOSTOMY SPEAKING VALVE: HCPCS

## 2024-03-08 PROCEDURE — A9270 NON-COVERED ITEM OR SERVICE: HCPCS | Performed by: INTERNAL MEDICINE

## 2024-03-08 PROCEDURE — 92610 EVALUATE SWALLOWING FUNCTION: CPT

## 2024-03-08 RX ORDER — ACETAMINOPHEN 500 MG
1000 TABLET ORAL EVERY 6 HOURS PRN
Status: DISCONTINUED | OUTPATIENT
Start: 2024-03-08 | End: 2024-03-12 | Stop reason: HOSPADM

## 2024-03-08 RX ADMIN — CEPHALEXIN 500 MG: 250 FOR SUSPENSION ORAL at 05:57

## 2024-03-08 RX ADMIN — LANSOPRAZOLE 30 MG: 30 TABLET, ORALLY DISINTEGRATING, DELAYED RELEASE ORAL at 05:56

## 2024-03-08 RX ADMIN — GLYCOPYRROLATE 1 MG: 1 TABLET ORAL at 17:26

## 2024-03-08 RX ADMIN — CELECOXIB 200 MG: 200 CAPSULE ORAL at 05:56

## 2024-03-08 RX ADMIN — METOPROLOL TARTRATE 25 MG: 25 TABLET, FILM COATED ORAL at 17:26

## 2024-03-08 RX ADMIN — GLYCOPYRROLATE 1 MG: 1 TABLET ORAL at 13:18

## 2024-03-08 RX ADMIN — TERAZOSIN HYDROCHLORIDE 2 MG: 2 CAPSULE ORAL at 17:26

## 2024-03-08 RX ADMIN — METOPROLOL TARTRATE 25 MG: 25 TABLET, FILM COATED ORAL at 05:56

## 2024-03-08 RX ADMIN — CEPHALEXIN 500 MG: 250 FOR SUSPENSION ORAL at 13:20

## 2024-03-08 RX ADMIN — CEPHALEXIN 500 MG: 250 FOR SUSPENSION ORAL at 22:24

## 2024-03-08 RX ADMIN — GABAPENTIN 300 MG: 300 CAPSULE ORAL at 17:26

## 2024-03-08 RX ADMIN — IPRATROPIUM BROMIDE AND ALBUTEROL SULFATE 3 ML: 2.5; .5 SOLUTION RESPIRATORY (INHALATION) at 06:57

## 2024-03-08 RX ADMIN — Medication 3 ML: at 06:58

## 2024-03-08 RX ADMIN — RIVAROXABAN 20 MG: 20 TABLET, FILM COATED ORAL at 17:26

## 2024-03-08 RX ADMIN — LISINOPRIL 5 MG: 5 TABLET ORAL at 05:56

## 2024-03-08 RX ADMIN — QUETIAPINE FUMARATE 50 MG: 25 TABLET ORAL at 22:24

## 2024-03-08 RX ADMIN — GABAPENTIN 300 MG: 300 CAPSULE ORAL at 05:56

## 2024-03-08 RX ADMIN — LEVOTHYROXINE SODIUM 75 MCG: 0.07 TABLET ORAL at 05:56

## 2024-03-08 RX ADMIN — DOCUSATE SODIUM 50 MG AND SENNOSIDES 8.6 MG 1 TABLET: 8.6; 5 TABLET, FILM COATED ORAL at 22:34

## 2024-03-08 RX ADMIN — POTASSIUM BICARBONATE 50 MEQ: 978 TABLET, EFFERVESCENT ORAL at 08:14

## 2024-03-08 RX ADMIN — ASPIRIN 81 MG 81 MG: 81 TABLET ORAL at 05:56

## 2024-03-08 RX ADMIN — GLYCOPYRROLATE 1 MG: 1 TABLET ORAL at 05:56

## 2024-03-08 RX ADMIN — ROSUVASTATIN CALCIUM 20 MG: 20 TABLET, FILM COATED ORAL at 05:56

## 2024-03-08 ASSESSMENT — PAIN DESCRIPTION - PAIN TYPE
TYPE: ACUTE PAIN

## 2024-03-08 ASSESSMENT — FIBROSIS 4 INDEX: FIB4 SCORE: 2.61

## 2024-03-08 NOTE — CARE PLAN
The patient is Stable - Low risk of patient condition declining or worsening    Shift Goals  Clinical Goals: secretion management, SBP <180, mobility, sleep  Patient Goals: comfort, stay informed  Family Goals: updates    Progress made toward(s) clinical / shift goals:    Problem: Knowledge Deficit - Standard  Goal: Patient and family/care givers will demonstrate understanding of plan of care, disease process/condition, diagnostic tests and medications  Outcome: Progressing     Problem: Fall Risk  Goal: Patient will remain free from falls  Outcome: Progressing     Problem: Hemodynamics  Goal: Patient's hemodynamics, fluid balance and neurologic status will be stable or improve  Outcome: Progressing     Problem: Respiratory  Goal: Patient will achieve/maintain optimum respiratory ventilation and gas exchange  Outcome: Progressing     Problem: Mechanical Ventilation  Goal: Successful weaning off mechanical ventilator, spontaneously maintains adequate gas exchange  Outcome: Progressing  Goal: Patient will be able to express needs and understand communication  Outcome: Progressing     Problem: Pain - Standard  Goal: Alleviation of pain or a reduction in pain to the patient’s comfort goal  Outcome: Progressing       Patient is not progressing towards the following goals:

## 2024-03-08 NOTE — THERAPY
"Speech Language Pathology   Clinical Swallow Evaluation     Patient Name: Parvez Peguero  AGE:  74 y.o., SEX:  male  Medical Record #: [id number]  Date of Service: 3/8/2024      History of Present Illness  74 y.o. male who presented on 2/27 with syncopal episode with subsequent trauma (tracheal/cricoid cartilage fracture with pneumothoraces, unclear if related to intubation outside facility). Pt intubated 2/27, trach placed 3/1. Pt s/p laryngoscopy, tracheoscopy 3/1, per ENT \"swollen tvc's but with a reasonable glottic airway, symmetric, arytenoids in the proper location, little evidence of mucosal tearing, subglottis and trachea normal down to the tracheosotomy tube. \"     CMHx: fracture of thyroid cartilage, pneumomediastinum, pneumothorax    PMHx: A-fib, acute nasopharyngitis, GERD, DM, congestive heart failure, Addisons disease    CXR 3/8:  Slightly increased moderate CHF with edema      General Information:  Vitals  O2 (LPM): 10  O2 Delivery Device: Tracheal Mask  Level of Consciousness: Alert, Awake  Orientation: Oriented x 4  Follows Directives: Yes    Prior Living Situation & Level of Function:  Lives with - Patient's Self Care Capacity: Spouse   Communication: WFL  Swallowing: WFL       Oral Mechanism Evaluation:  Dentition: Natural dentition, Some missing dentition   Facial Symmetry: Equal  Facial Sensation: Equal     Labial Observations: WFL   Lingual Observations: Midline  Motor Speech: WFL       Laryngeal Function:  Secretion Management: Adequate  Voice Quality: WFL  Cough: Perceptually WNL       Subjective  RN and ENT cleared patient for clinical swallow evaluation. Pt denied any history of dysphagia. Endorsed regular diet at baseline. Pt queried reason for NPO status during hospital admission; provided education regarding increased risk of aspiration and dysphagia in setting of endotracheal intubation, tracheostomy, and tracheal/cricoid cartilage fracture. Pt endorsed understanding. Agreeable to " PO trials with SLP.       Assessment  Current Method of Nutrition: NPO until cleared by speech pathology, NGT  Positioning: Sitting [facility]  Bolus Administration: SLP, Patient  O2 (LPM): 10 O2 Delivery Device: Tracheal Mask  Factor(s) Affecting Performance: None  Tracheostomy : Yes  Type of Airway: Other (Comments)  Tracheostomy Cuff: Other (Comments)  Speaking Valve Placement: Yes  Respiratory Support: Trach Collar / Mask      Swallowing Trials:  Swallowing Trials  Ice: WFL  Thin Liquid (TN0): Impaired      Comments: Patient demo'd adequate oral bolus acceptance and containment. No cough or throat clear following ice chips and thin liquids via tsp. Immediate reflexive cough with thin liquids via cup. Vocal quality remained clear throughout oral intake. No increased WOB observed. Single swallow completed per bolus. Provided education regarding indication for a diagnostic swallow study, pt stated understanding.       Clinical Impressions  Patient presents with clinical indicators and an increased risk for oropharyngeal dysphagia in setting of endotracheal intubation, tracheostomy, and tracheal/cricoid cartilage fracture, and prolonged NPO status. Pt would benefit from a diagnostic swallow study prior to initiation of an oral diet. Per ENT, pt to begin capping trials with possible decannulation tomorrow (3/9). Thus, recommend hold diagnostic study until after decannulation. Pt okay for minimal ice chips with staff following oral care only when PMV donned for comfort, oral hydration, reduced risk of disuse atrophy, and pharyngeal secretion management. SLP to follow.       Recommendations  Diet Consistency: NPO/NGT pending FEES s/p decannulation (Okay for minimal ice chips with staff following diligent oral care with PMV donned)  Instrumentation: FEES  Medication: Non Oral  Oral Care: Q4h    Speaking valve:   1. Patient to use speaking valve under the following conditions:              Placement: Trained staff only           "    Duration: All waking hours, as tolerated  2. Remove speaking valve with any signs of respiratory distress.       SLP Treatment Plan  Treatment Plan: Dysphagia Treatment  SLP Frequency: 4x Per Week  Estimated Duration: Until Therapy Goals Met      Anticipated Discharge Needs  Discharge Recommendations: Recommend post-acute placement for additional speech therapy services prior to discharge home   Therapy Recommendations Upon DC: Dysphagia Training, Community Re-Integration, Patient / Family / Caregiver Education        Patient / Family Goals  Patient / Family Goal #1: \"It's easier to talk with this\"  Goal #1 Outcome: Progressing as expected  Short Term Goals  Short Term Goal # 1: Pt will tolerate speaking valve during all waking hours without negative change to cardiopulmonary vitals  Short Term Goal # 2: Pt will complete a diagnostic swallow study to objectively assess swallow function to inform plan of care      ANJANA Hodges   "

## 2024-03-08 NOTE — DISCHARGE PLANNING
Case Management Discharge Planning    Admission Date: 2/27/2024  GMLOS: 26.1  ALOS: 10    6-Clicks ADL Score:    6-Clicks Mobility Score:        Anticipated Discharge Dispo: Discharge Disposition: D/T to home under [facility] care in anticipation of covered skilled care (06)  Discharge Contact Phone Number: [phone]    DME Needed: No    Action(s) Taken: OTHER    Patient was discussed in rounds patient has been talking over trache. Completed chart review and per speech recommending post acute placement. Per Dr. Contreras if patient tolerates speaking valve may place cap- if tolerates will remove tache tube 3/9     Escalations Completed: None    Medically Clear: No    Next Steps: pending PT/OT recommendations and 6 clicks to be completed. Anticipate patient will need SNF placement on discharge. Once therapy notes are in will review recommendations and send referrals.    Barriers to Discharge: Medical clearance and Pending Placement    Is the patient up for discharge tomorrow: No

## 2024-03-08 NOTE — CARE PLAN
The patient is Stable - Low risk of patient condition declining or worsening    Shift Goals  Clinical Goals: hemodynamic stability SBP <180, stable neuro exams, improved oxygenation  Patient Goals: stay informed, trach comfort, sleep  Family Goals: updates    Problem: Knowledge Deficit - Standard  Goal: Patient and family/care givers will demonstrate understanding of plan of care, disease process/condition, diagnostic tests and medications  Description: Target End Date:  1-3 days or as soon as patient condition allows    Document in Patient Education    1.  Patient and family/caregiver oriented to unit, equipment, visitation policy and means for communicating concern  2.  Complete/review Learning Assessment  3.  Assess knowledge level of disease process/condition, treatment plan, diagnostic tests and medications  4.  Explain disease process/condition, treatment plan, diagnostic tests and medications  Outcome: Progressing     Problem: Fall Risk  Goal: Patient will remain free from falls  Description: Target End Date:  Prior to discharge or change in level of care    Document interventions on the Fournier Andrea Fall Risk Assessment    1.  Assess for fall risk factors  2.  Implement fall precautions  Outcome: Progressing     Problem: Hemodynamics  Goal: Patient's hemodynamics, fluid balance and neurologic status will be stable or improve  Description: Target End Date:  Prior to discharge or change in level of care    Document on Assessment and I/O flowsheet templates    1.  Monitor vital signs, pulse oximetry and cardiac monitor per provider order and/or policy  2.  Maintain blood pressure per provider order  3.  Hemodynamic monitoring per provider order  4.  Manage IV fluids and IV infusions  5.  Monitor intake and output  6.  Daily weights per unit policy or provider order  7.  Assess peripheral pulses and capillary refill  8.  Assess color and body temperature  9.  Position patient for maximum circulation/cardiac  output  10. Monitor for signs/symptoms of excessive bleeding  11. Assess mental status, restlessness and changes in level of consciousness  12. Monitor temperature and report fever or hypothermia to provider immediately. Consideration of targeted temperature management.  Outcome: Progressing     Problem: Respiratory  Goal: Patient will achieve/maintain optimum respiratory ventilation and gas exchange  Description: Target End Date:  Prior to discharge or change in level of care    Document on Assessment flowsheet    1.  Assess and monitor rate, rhythm, depth and effort of respiration  2.  Breath sounds assessed qshift and/or as needed  3.  Assess O2 saturation, administer/titrate oxygen as ordered  4.  Position patient for maximum ventilatory efficiency  5.  Turn, cough, and deep breath with splinting to improve effectiveness  6.  Collaborate with RT to administer medication/treatments per order  7.  Encourage use of incentive spirometer and encourage patient to cough after use and utilize splinting techniques if applicable  8.  Airway suctioning  9.  Monitor sputum production for changes in color, consistency and frequency  10. Perform frequent oral hygiene  11. Alternate physical activity with rest periods  Outcome: Progressing     Problem: Pain - Standard  Goal: Alleviation of pain or a reduction in pain to the patient’s comfort goal  Description: Target End Date:  Prior to discharge or change in level of care    Document on Vitals flowsheet    1.  Document pain using the appropriate pain scale per order or unit policy  2.  Educate and implement non-pharmacologic comfort measures (i.e. relaxation, distraction, massage, cold/heat therapy, etc.)  3.  Pain management medications as ordered  4.  Reassess pain after pain med administration per policy  5.  If opiods administered assess patient's response to pain medication is appropriate per POSS sedation scale  6.  Follow pain management plan developed in collaboration  with patient and interdisciplinary team (including palliative care or pain specialists if applicable)  Outcome: Progressing

## 2024-03-08 NOTE — PROGRESS NOTES
"S: Doing well.  Just got speaking valve and is doing well.  Breathes well when tt occluded. In process of swallowing eval also this am.     O: /63   Pulse 64   Temp 36.8 °C (98.2 °F) (Temporal)   Resp (!) 21   Ht 1.854 m (6' 0.99\")   Wt (!) 132 kg (290 lb 9.1 oz)   SpO2 97%   Recent Labs     03/06/24  0430 03/07/24  0350 03/08/24  0500   WBC 7.0 8.5 8.8   RBC 3.46* 3.18* 3.32*   HEMOGLOBIN 10.6* 9.7* 10.3*   HEMATOCRIT 32.4* 30.4* 31.3*   MCV 93.6 95.6 94.3   MCH 30.6 30.5 31.0   MCHC 32.7 31.9* 32.9   RDW 45.7 46.6 45.6   PLATELETCT 232 255 254   MPV 9.7 9.4 9.7     Recent Labs     03/06/24  0430 03/07/24  0350 03/08/24  0500   SODIUM 141 141 144   POTASSIUM 3.8 3.7 3.7   CHLORIDE 105 104 105   CO2 24 27 27   GLUCOSE 292* 297* 244*   BUN 25* 24* 24*   CREATININE 0.65 0.61 0.54   CALCIUM 8.6 8.7 8.7     I/O last 3 completed shifts:  In: 1610 [NG/GT:1430]  Out: 1925 [Urine:1925]  Neck-trach site clean-optifoam in place    A: POD 6 tracheostomy for laryngeal fracture    P: If tolerates speaking valve may place cap-if tolerates will remove trach tube in am.   "

## 2024-03-08 NOTE — PROGRESS NOTES
Trauma / Surgical Daily Progress Note    Date of Service  3/8/2024    Chief Complaint  74 y.o. male admitted 2024 with Trauma    Interval Events  GCS 15  Tracheostomy in.  Secretions manageable  Afebrile  TF goal  AB: Keflex:  tracheostomy site 3/7  Lovenox:  Xarelto for afib      Review of Systems  Review of Systems     Vital Signs for last 24 hours  Temp:  [36.4 °C (97.5 °F)-36.8 °C (98.2 °F)] 36.8 °C (98.2 °F)  Pulse:  [64-94] 79  Resp:  [11-79] 30  BP: (124-197)/() 143/113  SpO2:  [84 %-98 %] 91 %    Hemodynamic parameters for last 24 hours       Respiratory Data     Respiration: (!) 30, Pulse Oximetry: 91 %     Work Of Breathing / Effort: Mild  RUL Breath Sounds: Crackles, RML Breath Sounds: Crackles;Diminished, RLL Breath Sounds: Crackles;Diminished, WINSTON Breath Sounds: Crackles, LLL Breath Sounds: Crackles;Diminished    Physical Exam  Physical Exam  Cardiovascular:      Rate and Rhythm: Rhythm irregular.   Pulmonary:      Effort: No respiratory distress.   Abdominal:      Palpations: Abdomen is soft.   Neurological:      GCS: GCS eye subscore is 4. GCS verbal subscore is 5. GCS motor subscore is 6.         Laboratory  Recent Results (from the past 24 hour(s))   POCT glucose device results    Collection Time: 24 12:00 PM   Result Value Ref Range    POC Glucose, Blood 245 (H) 65 - 99 mg/dL   POCT glucose device results    Collection Time: 24  5:22 PM   Result Value Ref Range    POC Glucose, Blood 252 (H) 65 - 99 mg/dL   POCT glucose device results    Collection Time: 24 12:15 AM   Result Value Ref Range    POC Glucose, Blood 212 (H) 65 - 99 mg/dL   EKG    Collection Time: 24  1:58 AM   Result Value Ref Range    Report       [facility]    Test Date:  2024  Pt Name:    ROSY CALVIN             Department: [facility]  MRN:        [id number]                      Room:       [facility]  Gender:     Male                         Technician: NIKITA  :        [id number]                    Requested By:YAZMIN CAMARENA  Order #:    [id number]                    Reading MD: Tex Washington MD    Measurements  Intervals                                Axis  Rate:       83                           P:          41  IN:         137                          QRS:        -26  QRSD:       99                           T:          53  QT:         414  QTc:        487    Interpretive Statements  Sinus rhythm  Atrial premature complex  Borderline left axis deviation  Borderline prolonged QT interval  Compared to ECG 03/03/2024 00:17:16  No significant changes  Electronically Signed On 03- 04:51:56 PST by Tex Washington MD     CBC with Differential: Tomorrow AM    Collection Time: 03/08/24  5:00 AM   Result Value Ref Range    WBC 8.8 4.8 - 10.8 K/uL    RBC 3.32 (L) 4.70 - 6.10 M/uL    Hemoglobin 10.3 (L) 14.0 - 18.0 g/dL    Hematocrit 31.3 (L) 42.0 - 52.0 %    MCV 94.3 81.4 - 97.8 fL    MCH 31.0 27.0 - 33.0 pg    MCHC 32.9 32.3 - 36.5 g/dL    RDW 45.6 35.9 - 50.0 fL    Platelet Count 254 164 - 446 K/uL    MPV 9.7 9.0 - 12.9 fL    Neutrophils-Polys 72.60 (H) 44.00 - 72.00 %    Lymphocytes 13.50 (L) 22.00 - 41.00 %    Monocytes 11.00 0.00 - 13.40 %    Eosinophils 2.30 0.00 - 6.90 %    Basophils 0.30 0.00 - 1.80 %    Immature Granulocytes 0.30 0.00 - 0.90 %    Nucleated RBC 0.00 0.00 - 0.20 /100 WBC    Neutrophils (Absolute) 6.35 1.82 - 7.42 K/uL    Lymphs (Absolute) 1.18 1.00 - 4.80 K/uL    Monos (Absolute) 0.96 (H) 0.00 - 0.85 K/uL    Eos (Absolute) 0.20 0.00 - 0.51 K/uL    Baso (Absolute) 0.03 0.00 - 0.12 K/uL    Immature Granulocytes (abs) 0.03 0.00 - 0.11 K/uL    NRBC (Absolute) 0.00 K/uL   Comp Metabolic Panel (CMP): Tomorrow AM    Collection Time: 03/08/24  5:00 AM   Result Value Ref Range    Sodium 144 135 - 145 mmol/L    Potassium 3.7 3.6 - 5.5 mmol/L    Chloride 105 96 - 112 mmol/L    Co2 27 20 - 33 mmol/L    Anion Gap 12.0 7.0 - 16.0    Glucose 244 (H) 65 - 99 mg/dL    Bun 24 (H) 8 - 22  mg/dL    Creatinine 0.54 0.50 - 1.40 mg/dL    Calcium 8.7 8.5 - 10.5 mg/dL    Correct Calcium 9.5 8.5 - 10.5 mg/dL    AST(SGOT) 135 (H) 12 - 45 U/L    ALT(SGPT) 234 (H) 2 - 50 U/L    Alkaline Phosphatase 156 (H) 30 - 99 U/L    Total Bilirubin 0.5 0.1 - 1.5 mg/dL    Albumin 3.0 (L) 3.2 - 4.9 g/dL    Total Protein 6.2 6.0 - 8.2 g/dL    Globulin 3.2 1.9 - 3.5 g/dL    A-G Ratio 0.9 g/dL   ESTIMATED GFR    Collection Time: 03/08/24  5:00 AM   Result Value Ref Range    GFR (CKD-EPI) 104 >60 mL/min/1.73 m 2   POCT glucose device results    Collection Time: 03/08/24  5:05 AM   Result Value Ref Range    POC Glucose, Blood 189 (H) 65 - 99 mg/dL       Fluids    Intake/Output Summary (Last 24 hours) at 3/8/2024 1141  Last data filed at 3/8/2024 1000  Gross per 24 hour   Intake 1000 ml   Output 1200 ml   Net -200 ml       Core Measures & Quality Metrics  Core Measures & Quality Metrics  RAP Score Total: 10    CAGE Results: not completed Blood Alcohol>0.08: no       Assessment/Plan  Elevated troponin- (present on admission)  Assessment & Plan  Admission troponin level 354.  EKG with SR without acute changes.  3/3 Cardiology recommendation:  Heparin gtt, ASA 81, and Xarelto when feasible.  Secretions from trach are no longer bloody.  Heparin gtt and ASA started  3/8 Continues on Xarelto  Otis Ni MD, Cardiology    Fracture closed, thyroid cartilage, initial encounter (HCC)- (present on admission)  Assessment & Plan  CT at [facility] showing mildly displaced fracture of the left para midline aspect of the thyroid cartilage.  Bronchoscopy in ICU without evidence of tracheobronchial injury distal to the endotracheal tube however bloody sections noted proximally.  CT neck with thyroid cartilage fracture & suspect injury to cricoid cartilage.  3/2 Tracheostomy placement. Direct laryngoscopy and tracheoscopy.  Flaco Contreras MD. [facility].    Pneumomediastinum (HCC)- (present on admission)  Assessment &  Plan  Extensive gas is noted within the superficial and deep soft tissue planes of the upper chest and neck.  Opacification of a 4 cm segment of the trachea, distal to the cords, surrounding the endotracheal tube is present, and could represent a manifestation of tracheal injury.  CT neck with subcutaneous emphysema & pneumomediastinum.  ENT and GI consulted for concern for tracheal/esophageal injuries.  Flaco Contreras MD. [facility].  Don Armstrong MD. [facility].    Respiratory failure following trauma (HCC)- (present on admission)  Assessment & Plan  Intubated at [facility].  3/1 Endoscopic evaluation of airway and trach placement today with ENT  3/2  Switch to trach collar, tolerating  3/3 Tolerating t piece.  3/6 Downsized to 6 noncuffed by ENT.  Keflex initiated for purulent drainage, 7 days.    Tracheostomy to be managed by ENT.  Not to be decannulated this admission.  Can follow up with ENT as an outpatient for decannulation when appropriate    Fever  Assessment & Plan  Temperature 100-101 F, responding to tylenol.  Signficant secretion burden, likely from this, no clearly purulent, normal wbc count.  Continue to monitor and treat with tylenol for now.    Syncope and collapse- (present on admission)  Assessment & Plan  EKG sinus rhythm & left anterior fascicular block.  Trend troponin.   Echocardiogram with LVEF 50%.  Continuous cardiac monitoring  3/1 Some ectopy overnight self limited and seems to be asymptomatic, reviewed electrolytes WNL    Pneumothorax on left- (present on admission)  Assessment & Plan  Left chest tube placed by [facility].  2/29 Water seal chest tube.  3/1 Will keep chest tube until definitive management of airway  3/2 CXR in AM, if trace pneumothorax is stable, remove chest tube  3/3 CXR without pneumo, chest tube removal.  Serial CXRs    History of chronic CHF- (present on admission)  Assessment & Plan  Per chart review.   History of dilated  cardiomyopathy  2/28 Echocardiogram with LVEF 50%.    Diabetes (HCC)- (present on admission)  Assessment & Plan  Chronic condition treated with Insulin and metformin.  Holding maintenance metformin for 48 hours following intravenous contrast administration.  Insulin sliding scale coverage.  3/1 15 u lantus resumed.   3/2 lantus increase to 25 u  3/3 Start insulin gtt  3/5 Insulin gtt stopped.  3/6 Basilar insulin increased to 35 units qAM.    A-fib (HCC)- (present on admission)  Assessment & Plan  Chronic condition treated with Xarelto and Toprol Xl  EKG sinus rhythm.  Toprol XL resumed on admit.  Systemic anticoagulation held on admission.  3/8 Continued Xarelto    GERD (gastroesophageal reflux disease)- (present on admission)  Assessment & Plan  Chronic condition treated with omeprazole.  Holding maintenance medication during acute traumatic illness.  Lansoprasole via enteral tube.    Urinary retention- (present on admission)  Assessment & Plan  Chronic condition treated with tamsulosin.  Holding maintenance medication during acute traumatic illness. Consider initiation when tolerating PO.    Primary hypertension- (present on admission)  Assessment & Plan  Chronic condition treated with lisinopril.  3/6 Resumed maintenance medication.    Hypothyroid- (present on admission)  Assessment & Plan  Chronic condition treated with levothyroxine.  3/6 Resumed maintenance medication.    No contraindication to deep vein thrombosis (DVT) prophylaxis- (present on admission)  Assessment & Plan  2/29 Prophylactic dose enoxaparin 40 mg BID initiated.     Trauma- (present on admission)  Assessment & Plan  Walked into gas station stated he didn't feel well then had syncopal episode.  Trauma Red Transfer Activation from [facility] in [place], [place].  Taz Ward MD. Trauma Surgery.    High cholesterol- (present on admission)  Assessment & Plan  Chronic condition treated with Crestor.  Resumed maintenance  medication on admission.        Discussed patient condition with RN, RT, Pharmacy, and Dietary.  CRITICAL CARE TIME EXCLUDING PROCEDURES:  33   minutes

## 2024-03-08 NOTE — CARE PLAN
The patient is Stable - Low risk of patient condition declining or worsening    Shift Goals  Clinical Goals: secretion management, SBP <180, mobility, sleep  Patient Goals: comfort, stay informed  Family Goals: updates      Problem: Knowledge Deficit - Standard  Goal: Patient and family/care givers will demonstrate understanding of plan of care, disease process/condition, diagnostic tests and medications  Description: Target End Date:  1-3 days or as soon as patient condition allows    Document in Patient Education    1.  Patient and family/caregiver oriented to unit, equipment, visitation policy and means for communicating concern  2.  Complete/review Learning Assessment  3.  Assess knowledge level of disease process/condition, treatment plan, diagnostic tests and medications  4.  Explain disease process/condition, treatment plan, diagnostic tests and medications  Outcome: Progressing     Problem: Fall Risk  Goal: Patient will remain free from falls  Description: Target End Date:  Prior to discharge or change in level of care    Document interventions on the Irlanda Mendez Fall Risk Assessment    1.  Assess for fall risk factors  2.  Implement fall precautions  Outcome: Progressing     Problem: Hemodynamics  Goal: Patient's hemodynamics, fluid balance and neurologic status will be stable or improve  Description: Target End Date:  Prior to discharge or change in level of care    Document on Assessment and I/O flowsheet templates    1.  Monitor vital signs, pulse oximetry and cardiac monitor per provider order and/or policy  2.  Maintain blood pressure per provider order  3.  Hemodynamic monitoring per provider order  4.  Manage IV fluids and IV infusions  5.  Monitor intake and output  6.  Daily weights per unit policy or provider order  7.  Assess peripheral pulses and capillary refill  8.  Assess color and body temperature  9.  Position patient for maximum circulation/cardiac output  10. Monitor for signs/symptoms  of excessive bleeding  11. Assess mental status, restlessness and changes in level of consciousness  12. Monitor temperature and report fever or hypothermia to provider immediately. Consideration of targeted temperature management.  Outcome: Progressing     Problem: Respiratory  Goal: Patient will achieve/maintain optimum respiratory ventilation and gas exchange  Description: Target End Date:  Prior to discharge or change in level of care    Document on Assessment flowsheet    1.  Assess and monitor rate, rhythm, depth and effort of respiration  2.  Breath sounds assessed qshift and/or as needed  3.  Assess O2 saturation, administer/titrate oxygen as ordered  4.  Position patient for maximum ventilatory efficiency  5.  Turn, cough, and deep breath with splinting to improve effectiveness  6.  Collaborate with RT to administer medication/treatments per order  7.  Encourage use of incentive spirometer and encourage patient to cough after use and utilize splinting techniques if applicable  8.  Airway suctioning  9.  Monitor sputum production for changes in color, consistency and frequency  10. Perform frequent oral hygiene  11. Alternate physical activity with rest periods  Outcome: Progressing     Problem: Pain - Standard  Goal: Alleviation of pain or a reduction in pain to the patient’s comfort goal  Description: Target End Date:  Prior to discharge or change in level of care    Document on Vitals flowsheet    1.  Document pain using the appropriate pain scale per order or unit policy  2.  Educate and implement non-pharmacologic comfort measures (i.e. relaxation, distraction, massage, cold/heat therapy, etc.)  3.  Pain management medications as ordered  4.  Reassess pain after pain med administration per policy  5.  If opiods administered assess patient's response to pain medication is appropriate per POSS sedation scale  6.  Follow pain management plan developed in collaboration with patient and interdisciplinary  team (including palliative care or pain specialists if applicable)  Outcome: Progressing

## 2024-03-08 NOTE — CARE PLAN
Pt CAPPED  Problem: Aerosol Therapy  Goal: Improved hydration/ability to mobilize secretions and/or decreased airway edema  Description: Target End Date:  resolve prior to discharge or when underlying condition is resolved/stabilized    1.  Implement heated or cool aerosol therapy  2.  Assessed for optimal hydration, decreased edema and/or improved ability to mobilize secretions  Outcome: Met     Problem: Bronchopulmonary Hygiene  Goal: Increase mobilization of retained secretions  Description: Target End Date:  2 to 3 days    1.  Perform bronchopulmonary therapy as indicated by assessment  2.  Perform airway suctioning  3.  Perform actions to maintain patient airway  Outcome: Met

## 2024-03-08 NOTE — WOUND TEAM
[facility]  Inpatient Services  Wound and Skin Care Brief Evaluation    Admission Date: 2/27/2024     Last order of IP CONSULT TO WOUND CARE was found on 3/6/2024 from Hospital Encounter on 2/27/2024     HPI, PMH, SH: Reviewed    No chief complaint on file.    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 Chart and images reviewed. This discussed with bedside RN Don. This clinician in to assess patient. Patient pleasant and agreeable. Periskin around trach site slightly red and macerated due to drainage and increased humidity to area. Hydrofera blue applied along trach plate.     No pressure injuries or advanced wound care needs identified. Wound consult completed. No further follow up unless indicated and consulted.     Wound 03/01/24 Incision Neck STERISTRIPSMASTISOLdrain spongeTRACH SOFT COLLAR (Active)   Date First Assessed/Time First Assessed: 03/01/24 1516   Primary Wound Type: Incision  Location: Neck  Wound Description (Comments): STERISTRIPSMASTISOLdrain spongeTRACH SOFT COLLAR      Assessments 3/7/2024  4:00 PM   Wound Image     Site Assessment Red   Periwound Assessment Red   Treatments Cleansed;Site care   Wound Cleansing Normal Saline Irrigation   Periwound Protectant Skin Protectant Wipes to Periwound   Dressing Status Clean;Dry;Intact   Dressing Changed New   Dressing Cleansing/Solutions Not Applicable   Dressing Options Hydrofera Blue Ready   Dressing Change/Treatment Frequency Every Shift, and As Needed   NEXT Dressing Change/Treatment Date 03/07/24   Wound Team Following Not following       PREVENTATIVE INTERVENTIONS:    Q shift Mark - performed per nursing policy  Q shift pressure point assessments - performed per nursing policy    Respiratory  Trach with fenestrated Hydrofera Blue - Applied this Visit

## 2024-03-08 NOTE — THERAPY
"Speech Language Pathology   Speaking Valve Evaluation     Patient Name: Parvez Peguero  AGE:  74 y.o., SEX:  male  Medical Record #: [id number]  Date of Service: 3/8/2024      History of Present Illness  74 y.o. male who presented on 2/27 with syncopal episode with subsequent trauma (tracheal/cricoid cartilage fracture with pneumothoraces, unclear if related to intubation outside facility). Pt intubated 2/27, trach placed 3/1. Pt s/p laryngoscopy, tracheoscopy 3/1, per ENT \"swollen tvc's but with a reasonable glottic airway, symmetric, arytenoids in the proper location, little evidence of mucosal tearing, subglottis and trachea normal down to the tracheosotomy tube. \"     CMHx: fracture of thyroid cartilage, pneumomediastinum, pneumothorax    PMHx: A-fib, acute nasopharyngitis, GERD, DM, congestive heart failure, Addisons disease    CXR 3/8:  Slightly increased moderate CHF with edema      General Information  Vitals  O2 (LPM): 10  O2 Delivery Device: Tracheal Mask  Level of Consciousness: Alert, Awake  Orientation: Oriented x 4  Follows Directives: Yes      Prior Living Situation & Level of Function  Lives with - Patient's Self Care Capacity: Spouse  Communication: WFL  Swallowing: WFL       Oral Mechanism Evaluation  Dentition: Natural dentition, Some missing dentition  Facial Symmetry: Equal  Facial Sensation: Equal  Labial Observations: WFL  Lingual Observations: Midline  Motor Speech: WFL      Subjective  RN, RT, and ENT cleared pt for speaking valve evaluation. Pt received awake, sitting upright in chair. Pt able to communicate prior to PMV placement; however strained vocal quality with low volume appreciated. Of note, ENT in room for some of session and relayed pt to start capping trials today with possible decannulation tomorrow (3/9/24).      Assessment  Cardiopulmonary Vitals  Initial: 95% O2, 78 HR, 35 RR  End: 97% O2, 66 HR, 25 RR  Vital Changes Observed: None    Speaking Valve Assessment "   Tracheostomy: Shiley  Size: 5.0  Cuff Position: N/A - cuffless  Oxygen Requirements: 10 L 40% FiO2  Oral Suctioning Provided: No  Tracheal Suctioning Provided: Prior to speaking valve placement  Upper Airway Patency with Speaking Valve: Adequate airflow, functional phonation  Vocal Quality: WFL  Breath Stacking Present: No  Behavior During Speaking Valve Trial: Relaxed  Duration of Speaking Valve Trial: 18 minutes  Speaking Valve Left On: Yes, RN Notified                   Comments: Pt received on trach collar with trach mask. Mild amount of secretions removed via tracheal suctioning prior to speaking valve placement. PMV donned with stable cardiopulmonary vitals. Patient communicating at level of conversation. Vocal quality WFL and with appropriate vocal loudness. Pt with strong cough, endorsed intermittently using Yankauer to remove oral secretions. Cardiopulmonary vitals remained stable throughout session. PMV left donned upon this clinician's exit, RN updated.      Benefits of a speaking valve include (1) improved oropharyngeal sensation by restoring airflow to the oropharynx, (2) improved cough effectiveness by restoring subglottic air pressure, (3) improved secretion management through improved ability to cough and orally expectorate, as well as increase evaporation of secretions during exhalation through the upper airway and (4) improved oxygenation by re-establishing physiologic positive end expiratory pressure (PEEP).      Clinical Impressions  Patient with adequate airflow and functional phonation with PMV placement. Recommend PMV placement by trained staff. Per ENT, pt possibly to begin capping trials this date. SLP to follow.       Recommendations  1. Patient to use speaking valve under the following conditions:   Placement: Trained staff only   Duration: All waking hours, as tolerated  2. Remove speaking valve with any signs of respiratory distress.      SLP Treatment Plan  Treatment Plan: Dysphagia  "Treatment, Voice Prosthesis Training, Patient/Family/Caregiver Training  SLP Frequency: 4x Per Week  Estimated Duration: Until Therapy Goals Met      Anticipated Discharge Needs  Discharge Recommendations: Recommend post-acute placement for additional speech therapy services prior to discharge home  Therapy Recommendations Upon DC: Dysphagia Training, Tracheostomy Training, Patient / Family / Caregiver Education, Community Re-Integration       Patient / Family Goals  Patient / Family Goal #1: \"It's easier to talk with this\"  Short Term Goals  Short Term Goal # 1: Pt will tolerate speaking valve during all waking hours without negative change to cardiopulmonary vitals      Mike Lloyd, SLP  "

## 2024-03-08 NOTE — CARE PLAN
Problem: Aerosol Therapy  Goal: Improved hydration/ability to mobilize secretions and/or decreased airway edema  Description: Target End Date:  resolve prior to discharge or when underlying condition is resolved/stabilized    1.  Implement heated or cool aerosol therapy  2.  Assessed for optimal hydration, decreased edema and/or improved ability to mobilize secretions  Outcome: Progressing     Problem: Bronchopulmonary Hygiene  Goal: Increase mobilization of retained secretions  Description: Target End Date:  2 to 3 days    1.  Perform bronchopulmonary therapy as indicated by assessment  2.  Perform airway suctioning  3.  Perform actions to maintain patient airway  Outcome: Progressing     Problem: Ventilation  Goal: Ability to achieve and maintain unassisted ventilation or tolerate decreased levels of ventilator support  Description: Target End Date:  4 days     Document on Vent flowsheet    1.  Support and monitor invasive and noninvasive mechanical ventilation  2.  Monitor ventilator weaning response  3.  Perform ventilator associated pneumonia prevention interventions  4.  Manage ventilation therapy by monitoring diagnostic test results  Outcome: Met

## 2024-03-09 ENCOUNTER — APPOINTMENT (OUTPATIENT)
Dept: RADIOLOGY | Facility: MEDICAL CENTER | Age: 74
DRG: 004 | End: 2024-03-09
Attending: SURGERY
Payer: COMMERCIAL

## 2024-03-09 LAB
ALBUMIN SERPL BCP-MCNC: 3.1 G/DL (ref 3.2–4.9)
ALBUMIN/GLOB SERPL: 0.9 G/DL
ALP SERPL-CCNC: 159 U/L (ref 30–99)
ALT SERPL-CCNC: 227 U/L (ref 2–50)
ANION GAP SERPL CALC-SCNC: 12 MMOL/L (ref 7–16)
AST SERPL-CCNC: 103 U/L (ref 12–45)
BASOPHILS # BLD AUTO: 0.5 % (ref 0–1.8)
BASOPHILS # BLD: 0.05 K/UL (ref 0–0.12)
BILIRUB SERPL-MCNC: 0.5 MG/DL (ref 0.1–1.5)
BUN SERPL-MCNC: 22 MG/DL (ref 8–22)
CALCIUM ALBUM COR SERPL-MCNC: 9.6 MG/DL (ref 8.5–10.5)
CALCIUM SERPL-MCNC: 8.9 MG/DL (ref 8.5–10.5)
CHLORIDE SERPL-SCNC: 101 MMOL/L (ref 96–112)
CO2 SERPL-SCNC: 26 MMOL/L (ref 20–33)
CREAT SERPL-MCNC: 0.54 MG/DL (ref 0.5–1.4)
EOSINOPHIL # BLD AUTO: 0.22 K/UL (ref 0–0.51)
EOSINOPHIL NFR BLD: 2.3 % (ref 0–6.9)
ERYTHROCYTE [DISTWIDTH] IN BLOOD BY AUTOMATED COUNT: 44.6 FL (ref 35.9–50)
GFR SERPLBLD CREATININE-BSD FMLA CKD-EPI: 104 ML/MIN/1.73 M 2
GLOBULIN SER CALC-MCNC: 3.4 G/DL (ref 1.9–3.5)
GLUCOSE BLD STRIP.AUTO-MCNC: 219 MG/DL (ref 65–99)
GLUCOSE BLD STRIP.AUTO-MCNC: 232 MG/DL (ref 65–99)
GLUCOSE BLD STRIP.AUTO-MCNC: 281 MG/DL (ref 65–99)
GLUCOSE BLD STRIP.AUTO-MCNC: 283 MG/DL (ref 65–99)
GLUCOSE SERPL-MCNC: 292 MG/DL (ref 65–99)
HCT VFR BLD AUTO: 33.2 % (ref 42–52)
HGB BLD-MCNC: 10.8 G/DL (ref 14–18)
IMM GRANULOCYTES # BLD AUTO: 0.04 K/UL (ref 0–0.11)
IMM GRANULOCYTES NFR BLD AUTO: 0.4 % (ref 0–0.9)
LYMPHOCYTES # BLD AUTO: 1.09 K/UL (ref 1–4.8)
LYMPHOCYTES NFR BLD: 11.4 % (ref 22–41)
MCH RBC QN AUTO: 30.6 PG (ref 27–33)
MCHC RBC AUTO-ENTMCNC: 32.5 G/DL (ref 32.3–36.5)
MCV RBC AUTO: 94.1 FL (ref 81.4–97.8)
MONOCYTES # BLD AUTO: 0.89 K/UL (ref 0–0.85)
MONOCYTES NFR BLD AUTO: 9.3 % (ref 0–13.4)
NEUTROPHILS # BLD AUTO: 7.24 K/UL (ref 1.82–7.42)
NEUTROPHILS NFR BLD: 76.1 % (ref 44–72)
NRBC # BLD AUTO: 0 K/UL
NRBC BLD-RTO: 0 /100 WBC (ref 0–0.2)
PLATELET # BLD AUTO: 301 K/UL (ref 164–446)
PMV BLD AUTO: 9.5 FL (ref 9–12.9)
POTASSIUM SERPL-SCNC: 4 MMOL/L (ref 3.6–5.5)
PROT SERPL-MCNC: 6.5 G/DL (ref 6–8.2)
RBC # BLD AUTO: 3.53 M/UL (ref 4.7–6.1)
SODIUM SERPL-SCNC: 139 MMOL/L (ref 135–145)
WBC # BLD AUTO: 9.5 K/UL (ref 4.8–10.8)

## 2024-03-09 PROCEDURE — 71045 X-RAY EXAM CHEST 1 VIEW: CPT

## 2024-03-09 PROCEDURE — 80053 COMPREHEN METABOLIC PANEL: CPT

## 2024-03-09 PROCEDURE — 700102 HCHG RX REV CODE 250 W/ 637 OVERRIDE(OP): Performed by: SURGERY

## 2024-03-09 PROCEDURE — A9270 NON-COVERED ITEM OR SERVICE: HCPCS | Performed by: NURSE PRACTITIONER

## 2024-03-09 PROCEDURE — 770001 HCHG ROOM/CARE - MED/SURG/GYN PRIV*

## 2024-03-09 PROCEDURE — 82962 GLUCOSE BLOOD TEST: CPT

## 2024-03-09 PROCEDURE — 700102 HCHG RX REV CODE 250 W/ 637 OVERRIDE(OP)

## 2024-03-09 PROCEDURE — A9270 NON-COVERED ITEM OR SERVICE: HCPCS | Performed by: OTOLARYNGOLOGY

## 2024-03-09 PROCEDURE — 700102 HCHG RX REV CODE 250 W/ 637 OVERRIDE(OP): Performed by: STUDENT IN AN ORGANIZED HEALTH CARE EDUCATION/TRAINING PROGRAM

## 2024-03-09 PROCEDURE — 700102 HCHG RX REV CODE 250 W/ 637 OVERRIDE(OP): Performed by: OTOLARYNGOLOGY

## 2024-03-09 PROCEDURE — 85025 COMPLETE CBC W/AUTO DIFF WBC: CPT

## 2024-03-09 PROCEDURE — A9270 NON-COVERED ITEM OR SERVICE: HCPCS | Performed by: SURGERY

## 2024-03-09 PROCEDURE — A9270 NON-COVERED ITEM OR SERVICE: HCPCS | Performed by: STUDENT IN AN ORGANIZED HEALTH CARE EDUCATION/TRAINING PROGRAM

## 2024-03-09 PROCEDURE — 700102 HCHG RX REV CODE 250 W/ 637 OVERRIDE(OP): Performed by: NURSE PRACTITIONER

## 2024-03-09 PROCEDURE — 36415 COLL VENOUS BLD VENIPUNCTURE: CPT

## 2024-03-09 PROCEDURE — A9270 NON-COVERED ITEM OR SERVICE: HCPCS

## 2024-03-09 PROCEDURE — 99232 SBSQ HOSP IP/OBS MODERATE 35: CPT

## 2024-03-09 PROCEDURE — 700102 HCHG RX REV CODE 250 W/ 637 OVERRIDE(OP): Performed by: INTERNAL MEDICINE

## 2024-03-09 PROCEDURE — A9270 NON-COVERED ITEM OR SERVICE: HCPCS | Performed by: INTERNAL MEDICINE

## 2024-03-09 RX ADMIN — CEPHALEXIN 500 MG: 250 FOR SUSPENSION ORAL at 21:46

## 2024-03-09 RX ADMIN — TERAZOSIN HYDROCHLORIDE 2 MG: 2 CAPSULE ORAL at 16:31

## 2024-03-09 RX ADMIN — ROSUVASTATIN CALCIUM 20 MG: 20 TABLET, FILM COATED ORAL at 07:19

## 2024-03-09 RX ADMIN — LEVOTHYROXINE SODIUM 75 MCG: 0.07 TABLET ORAL at 07:13

## 2024-03-09 RX ADMIN — LISINOPRIL 5 MG: 5 TABLET ORAL at 07:13

## 2024-03-09 RX ADMIN — GLYCOPYRROLATE 1 MG: 1 TABLET ORAL at 06:56

## 2024-03-09 RX ADMIN — GLYCOPYRROLATE 1 MG: 1 TABLET ORAL at 12:24

## 2024-03-09 RX ADMIN — DOCUSATE SODIUM 50 MG AND SENNOSIDES 8.6 MG 1 TABLET: 8.6; 5 TABLET, FILM COATED ORAL at 20:55

## 2024-03-09 RX ADMIN — CELECOXIB 200 MG: 200 CAPSULE ORAL at 07:13

## 2024-03-09 RX ADMIN — METOPROLOL TARTRATE 25 MG: 25 TABLET, FILM COATED ORAL at 07:13

## 2024-03-09 RX ADMIN — QUETIAPINE FUMARATE 50 MG: 25 TABLET ORAL at 20:55

## 2024-03-09 RX ADMIN — LANSOPRAZOLE 30 MG: 30 TABLET, ORALLY DISINTEGRATING, DELAYED RELEASE ORAL at 07:13

## 2024-03-09 RX ADMIN — GABAPENTIN 300 MG: 300 CAPSULE ORAL at 16:31

## 2024-03-09 RX ADMIN — CEPHALEXIN 500 MG: 250 FOR SUSPENSION ORAL at 15:48

## 2024-03-09 RX ADMIN — RIVAROXABAN 20 MG: 20 TABLET, FILM COATED ORAL at 16:31

## 2024-03-09 RX ADMIN — METOPROLOL TARTRATE 25 MG: 25 TABLET, FILM COATED ORAL at 16:31

## 2024-03-09 RX ADMIN — CEPHALEXIN 500 MG: 250 FOR SUSPENSION ORAL at 08:10

## 2024-03-09 RX ADMIN — ASPIRIN 81 MG 81 MG: 81 TABLET ORAL at 07:13

## 2024-03-09 RX ADMIN — GABAPENTIN 300 MG: 300 CAPSULE ORAL at 07:13

## 2024-03-09 RX ADMIN — GLYCOPYRROLATE 1 MG: 1 TABLET ORAL at 16:31

## 2024-03-09 ASSESSMENT — ENCOUNTER SYMPTOMS
SHORTNESS OF BREATH: 0
FEVER: 0
VOMITING: 0
ABDOMINAL PAIN: 0
CHILLS: 0
COUGH: 0
MYALGIAS: 0
NAUSEA: 0

## 2024-03-09 ASSESSMENT — PAIN DESCRIPTION - PAIN TYPE: TYPE: ACUTE PAIN

## 2024-03-09 NOTE — PROGRESS NOTES
Received report from TICU, pt arrived to T433 at 1840. Pt vital sign stable, call light within reach, all questions answered at this time.

## 2024-03-09 NOTE — CARE PLAN
"  Problem: Respiratory  Goal: Patient will achieve/maintain optimum respiratory ventilation and gas exchange  Description: Target End Date:  Prior to discharge or change in level of care    Document on Assessment flowsheet    1.  Assess and monitor rate, rhythm, depth and effort of respiration  2.  Breath sounds assessed qshift and/or as needed  3.  Assess O2 saturation, administer/titrate oxygen as ordered  4.  Position patient for maximum ventilatory efficiency  5.  Turn, cough, and deep breath with splinting to improve effectiveness  6.  Collaborate with RT to administer medication/treatments per order  7.  Encourage use of incentive spirometer and encourage patient to cough after use and utilize splinting techniques if applicable  8.  Airway suctioning  9.  Monitor sputum production for changes in color, consistency and frequency  10. Perform frequent oral hygiene  11. Alternate physical activity with rest periods  Outcome: Progressing     Problem: Knowledge Deficit - Standard  Goal: Patient and family/care givers will demonstrate understanding of plan of care, disease process/condition, diagnostic tests and medications  Description: Target End Date:  1-3 days or as soon as patient condition allows    Document in Patient Education    1.  Patient and family/caregiver oriented to unit, equipment, visitation policy and means for communicating concern  2.  Complete/review Learning Assessment  3.  Assess knowledge level of disease process/condition, treatment plan, diagnostic tests and medications  4.  Explain disease process/condition, treatment plan, diagnostic tests and medications  Outcome: Progressing   The patient is Stable - Low risk of patient condition declining or worsening    Shift Goals  Clinical Goals: Tube feed, tolerate  Patient Goals: \"get devices removed\"  Family Goals: updates    Progress made toward(s) clinical / shift goals:  Pt tolerating trache capping, 2 liters supp O2 via nasal cannula. " Tolerating TF at goal rate    Patient is not progressing towards the following goals:

## 2024-03-09 NOTE — PROGRESS NOTES
Setting up tube feed per order  Vital [employer] bottled formula out of floor stock  Obtained 2 bottles from cafeteria per house req  21:00 TF started per order, titrated to goal rate of 65 mL/hr. Tolerating -n/v  Programmed Q4H flushes of 30 mL on kangaroo pump  Blood glucose stable, last accucheck result 219 at 01:15 a.m. Insulin per sliding scale  Tracheostomy in place, capped. Maintained by ENT d/t tracheal fx.   2 liters supp O2 via nasal cannula

## 2024-03-09 NOTE — PROGRESS NOTES
Trauma / Surgical Daily Progress Note    Date of Service  3/9/2024    Chief Complaint    74 y.o. male admitted 2/27/2024 with laryngeal injury after ground level fall.    Interval Events  Transferred from ICU to [facility].  Adequate pain control.  Tolerating capping.  Supplemental oxygen 1 L NC, IS 1500    -Trach removed by Dr. Contreras.  -FEEs pending  -Aggressive pulmonary hygiene with goal to wean oxygen off  -Mobilize patient out of bed and into a chair.  Okay to ambulate the hallways.      Review of Systems  Review of Systems   Constitutional:  Negative for chills, fever and malaise/fatigue.   Respiratory:  Negative for cough and shortness of breath.    Cardiovascular:  Negative for chest pain.   Gastrointestinal:  Negative for abdominal pain, nausea and vomiting.   Genitourinary:  Negative for dysuria.   Musculoskeletal:  Negative for myalgias.   All other systems reviewed and are negative.       Vital Signs  Temp:  [36.6 °C (97.9 °F)-37.1 °C (98.8 °F)] 36.8 °C (98.2 °F)  Pulse:  [64-89] 72  Resp:  [16-45] 18  BP: (100-194)/(66-82) 158/76  SpO2:  [90 %-100 %] 97 %    Physical Exam  Physical Exam  Vitals and nursing note reviewed.   Constitutional:       General: He is not in acute distress.     Appearance: He is overweight.      Interventions: Nasal cannula in place.   HENT:      Mouth/Throat:      Mouth: Mucous membranes are moist.      Pharynx: Oropharynx is clear.   Eyes:      Pupils: Pupils are equal, round, and reactive to light.   Cardiovascular:      Rate and Rhythm: Normal rate and regular rhythm.      Pulses: Normal pulses.   Pulmonary:      Effort: No respiratory distress.      Breath sounds: Decreased breath sounds present.      Comments: Tracheostomy site covered with dressing  Abdominal:      General: There is no distension.      Palpations: Abdomen is soft.      Tenderness: There is no abdominal tenderness.   Musculoskeletal:         General: Normal range of motion.      Cervical back: Normal range of  motion.   Skin:     General: Skin is warm and dry.      Capillary Refill: Capillary refill takes 2 to 3 seconds.   Neurological:      General: No focal deficit present.      Mental Status: He is oriented to person, place, and time.   Psychiatric:         Mood and Affect: Mood normal.         Behavior: Behavior is cooperative.         Judgment: Judgment normal.         Laboratory  Recent Results (from the past 24 hour(s))   POCT glucose device results    Collection Time: 03/08/24  5:24 PM   Result Value Ref Range    POC Glucose, Blood 300 (H) 65 - 99 mg/dL   POCT glucose device results    Collection Time: 03/09/24  1:15 AM   Result Value Ref Range    POC Glucose, Blood 219 (H) 65 - 99 mg/dL   CBC with Differential: Tomorrow AM    Collection Time: 03/09/24  6:41 AM   Result Value Ref Range    WBC 9.5 4.8 - 10.8 K/uL    RBC 3.53 (L) 4.70 - 6.10 M/uL    Hemoglobin 10.8 (L) 14.0 - 18.0 g/dL    Hematocrit 33.2 (L) 42.0 - 52.0 %    MCV 94.1 81.4 - 97.8 fL    MCH 30.6 27.0 - 33.0 pg    MCHC 32.5 32.3 - 36.5 g/dL    RDW 44.6 35.9 - 50.0 fL    Platelet Count 301 164 - 446 K/uL    MPV 9.5 9.0 - 12.9 fL    Neutrophils-Polys 76.10 (H) 44.00 - 72.00 %    Lymphocytes 11.40 (L) 22.00 - 41.00 %    Monocytes 9.30 0.00 - 13.40 %    Eosinophils 2.30 0.00 - 6.90 %    Basophils 0.50 0.00 - 1.80 %    Immature Granulocytes 0.40 0.00 - 0.90 %    Nucleated RBC 0.00 0.00 - 0.20 /100 WBC    Neutrophils (Absolute) 7.24 1.82 - 7.42 K/uL    Lymphs (Absolute) 1.09 1.00 - 4.80 K/uL    Monos (Absolute) 0.89 (H) 0.00 - 0.85 K/uL    Eos (Absolute) 0.22 0.00 - 0.51 K/uL    Baso (Absolute) 0.05 0.00 - 0.12 K/uL    Immature Granulocytes (abs) 0.04 0.00 - 0.11 K/uL    NRBC (Absolute) 0.00 K/uL   Comp Metabolic Panel (CMP): Tomorrow AM    Collection Time: 03/09/24  6:41 AM   Result Value Ref Range    Sodium 139 135 - 145 mmol/L    Potassium 4.0 3.6 - 5.5 mmol/L    Chloride 101 96 - 112 mmol/L    Co2 26 20 - 33 mmol/L    Anion Gap 12.0 7.0 - 16.0    Glucose  292 (H) 65 - 99 mg/dL    Bun 22 8 - 22 mg/dL    Creatinine 0.54 0.50 - 1.40 mg/dL    Calcium 8.9 8.5 - 10.5 mg/dL    Correct Calcium 9.6 8.5 - 10.5 mg/dL    AST(SGOT) 103 (H) 12 - 45 U/L    ALT(SGPT) 227 (H) 2 - 50 U/L    Alkaline Phosphatase 159 (H) 30 - 99 U/L    Total Bilirubin 0.5 0.1 - 1.5 mg/dL    Albumin 3.1 (L) 3.2 - 4.9 g/dL    Total Protein 6.5 6.0 - 8.2 g/dL    Globulin 3.4 1.9 - 3.5 g/dL    A-G Ratio 0.9 g/dL   ESTIMATED GFR    Collection Time: 03/09/24  6:41 AM   Result Value Ref Range    GFR (CKD-EPI) 104 >60 mL/min/1.73 m 2   POCT glucose device results    Collection Time: 03/09/24  7:21 AM   Result Value Ref Range    POC Glucose, Blood 281 (H) 65 - 99 mg/dL   POCT glucose device results    Collection Time: 03/09/24 12:28 PM   Result Value Ref Range    POC Glucose, Blood 283 (H) 65 - 99 mg/dL       Fluids    Intake/Output Summary (Last 24 hours) at 3/9/2024 1402  Last data filed at 3/9/2024 1200  Gross per 24 hour   Intake 220 ml   Output 1750 ml   Net -1530 ml       Core Measures & Quality Metrics  Labs reviewed, Radiology images reviewed and Medications reviewed  Brooke catheter: No Brooke      DVT Prophylaxis: Xarelto  DVT prophylaxis - mechanical: SCDs  Ulcer prophylaxis: Not indicated    Assessed for rehab: Patient returned to prior level of function, rehabilitation not indicated at this time    RAP Score Total: 10    CAGE Results: not completed Blood Alcohol>0.08: no       Assessment/Plan  * Trauma- (present on admission)  Assessment & Plan  Walked into gas station stated he didn't feel well then had syncopal episode.  Trauma Red Transfer Activation from [facility] in [place], [place].  Taz Ward MD. Trauma Surgery.    Elevated troponin- (present on admission)  Assessment & Plan  Admission troponin level 354.  EKG with SR without acute changes.  3/3 Cardiology recommendation:  Heparin gtt, ASA 81, and Xarelto when feasible.  Secretions from trach are no longer bloody.   Heparin gtt and ASA started  3/8 Continues on Xarelto  Otis Ni MD, Cardiology    Fracture closed, thyroid cartilage, initial encounter ([facility])- (present on admission)  Assessment & Plan  CT at [facility] showing mildly displaced fracture of the left para midline aspect of the thyroid cartilage.  Bronchoscopy in ICU without evidence of tracheobronchial injury distal to the endotracheal tube however bloody sections noted proximally.  CT neck with thyroid cartilage fracture & suspect injury to cricoid cartilage.  3/2 Tracheostomy placement. Direct laryngoscopy and tracheoscopy.  3/9 Trach removed by Dr. Contreras.  Flaco Contreras MD. [facility].    Pneumomediastinum ([facility])- (present on admission)  Assessment & Plan  Extensive gas is noted within the superficial and deep soft tissue planes of the upper chest and neck.  Opacification of a 4 cm segment of the trachea, distal to the cords, surrounding the endotracheal tube is present, and could represent a manifestation of tracheal injury.  CT neck with subcutaneous emphysema & pneumomediastinum.  ENT and GI consulted for concern for tracheal/esophageal injuries.  Flaco Contreras MD. [facility].  Don Armstrong MD. [facility].    Respiratory failure following trauma ([facility])- (present on admission)  Assessment & Plan  Intubated at [facility].  3/1 Endoscopic evaluation of airway and trach placement today with ENT  3/2  Switch to trach collar, tolerating  3/3 Tolerating t piece.  3/6 Downsized to 6 noncuffed by ENT.  Keflex initiated for purulent drainage, 7 days.  3/9 Trach removed by Dr. Contreras. FEEs pending.    Tracheostomy to be managed by ENT.  Not to be decannulated this admission.  Can follow up with ENT as an outpatient for decannulation when appropriate    Fever  Assessment & Plan  Temperature 100-101 F, responding to tylenol.  Signficant secretion burden, likely from this, no clearly purulent, normal wbc count.  Continue  to monitor and treat with tylenol for now.    Syncope and collapse- (present on admission)  Assessment & Plan  EKG sinus rhythm & left anterior fascicular block.  Trend troponin.   Echocardiogram with LVEF 50%.  Continuous cardiac monitoring  3/1 Some ectopy overnight self limited and seems to be asymptomatic, reviewed electrolytes WNL    Pneumothorax on left- (present on admission)  Assessment & Plan  Left chest tube placed by Hernan.  2/29 Water seal chest tube.  3/1 Will keep chest tube until definitive management of airway  3/2 CXR in AM, if trace pneumothorax is stable, remove chest tube  3/3 CXR without pneumo, chest tube removal.  Serial CXRs    History of chronic CHF- (present on admission)  Assessment & Plan  Per chart review.   History of dilated cardiomyopathy  2/28 Echocardiogram with LVEF 50%.    Diabetes (HCC)- (present on admission)  Assessment & Plan  Chronic condition treated with Insulin and metformin.  Holding maintenance metformin for 48 hours following intravenous contrast administration.  Insulin sliding scale coverage.  3/1 15 u lantus resumed.   3/2 lantus increase to 25 u  3/3 Start insulin gtt  3/5 Insulin gtt stopped.  3/6 Basilar insulin increased to 35 units qAM.    A-fib (HCC)- (present on admission)  Assessment & Plan  Chronic condition treated with Xarelto and Toprol Xl  EKG sinus rhythm.  Toprol XL resumed on admit.  Systemic anticoagulation held on admission.  3/8 Continued Xarelto    GERD (gastroesophageal reflux disease)- (present on admission)  Assessment & Plan  Chronic condition treated with omeprazole.  Holding maintenance medication during acute traumatic illness.  Lansoprasole via enteral tube.    Urinary retention- (present on admission)  Assessment & Plan  Chronic condition treated with tamsulosin.  Holding maintenance medication during acute traumatic illness. Consider initiation when tolerating PO.    Primary hypertension- (present on admission)  Assessment &  Plan  Chronic condition treated with lisinopril.  3/6 Resumed maintenance medication.    Hypothyroid- (present on admission)  Assessment & Plan  Chronic condition treated with levothyroxine.  3/6 Resumed maintenance medication.    No contraindication to deep vein thrombosis (DVT) prophylaxis- (present on admission)  Assessment & Plan  2/29 Prophylactic dose enoxaparin 40 mg BID initiated.     High cholesterol- (present on admission)  Assessment & Plan  Chronic condition treated with Crestor.  Resumed maintenance medication on admission.        Discussed patient condition with RN, Patient, and trauma surgery, Dr. Ward.

## 2024-03-09 NOTE — CARE PLAN
"The patient is Watcher - Medium risk of patient condition declining or worsening    Shift Goals  Clinical Goals: oob activity, manage tube feeding  Patient Goals: \"get devices removed\"  Family Goals: updates    Progress made toward(s) clinical / shift goals:  Tube feeds running at Goal rate per order.       Problem: Knowledge Deficit - Standard  Goal: Patient and family/care givers will demonstrate understanding of plan of care, disease process/condition, diagnostic tests and medications  Description: Target End Date:  1-3 days or as soon as patient condition allows    Document in Patient Education    1.  Patient and family/caregiver oriented to unit, equipment, visitation policy and means for communicating concern  2.  Complete/review Learning Assessment  3.  Assess knowledge level of disease process/condition, treatment plan, diagnostic tests and medications  4.  Explain disease process/condition, treatment plan, diagnostic tests and medications  Outcome: Progressing     Problem: Fall Risk  Goal: Patient will remain free from falls  Description: Target End Date:  Prior to discharge or change in level of care    Document interventions on the [facility] Fall Risk Assessment    1.  Assess for fall risk factors  2.  Implement fall precautions  Outcome: Progressing     Problem: Pain - Standard  Goal: Alleviation of pain or a reduction in pain to the patient’s comfort goal  Description: Target End Date:  Prior to discharge or change in level of care    Document on Vitals flowsheet    1.  Document pain using the appropriate pain scale per order or unit policy  2.  Educate and implement non-pharmacologic comfort measures (i.e. relaxation, distraction, massage, cold/heat therapy, etc.)  3.  Pain management medications as ordered  4.  Reassess pain after pain med administration per policy  5.  If opiods administered assess patient's response to pain medication is appropriate per POSS sedation scale  6.  Follow pain " management plan developed in collaboration with patient and interdisciplinary team (including palliative care or pain specialists if applicable)  Outcome: Progressing

## 2024-03-09 NOTE — PROGRESS NOTES
4 Eyes Skin Assessment Completed by MITA Stevenson and MITA Acevedo.    Head WDL  Ears WDL  Nose WDL  Mouth WDL  Neck: Tracheostomy, velcro strap securement   Breast/Chest: L flank bruise. L Previous CT site CDI  Shoulder Blades WDL  Spine WDL  (R) Arm/Elbow/Hand: Preventative elbow mepilex CDI  (L) Arm/Elbow/Hand: Preventative elbow mepilex CDI  Abdomen WDL  Groin WDL  Scrotum/Coccyx/Buttocks: preventative sacral mepilex in place CDI  (R) Leg WDL  (L) Leg WDL  (R) Heel/Foot/Toe WDL  (L) Heel/Foot/Toe WDL          Devices In Places: Iris NGT w/bridle, tracheostomy, SCDs, pulse ox, nasal cannula      Interventions In Place N/A    Possible Skin Injury No    Pictures Uploaded Into Epic N/A  Wound Consult Placed N/A  RN Wound Prevention Protocol Ordered No

## 2024-03-09 NOTE — PROGRESS NOTES
"S: Doin6 well.  Trach has been capped since yesterday around 3.      O: BP (!) 164/82   Pulse 78   Temp 36.8 °C (98.2 °F) (Temporal)   Resp 18   Ht 1.854 m (6' 0.99\")   Wt (!) 132 kg (290 lb 9.1 oz)   SpO2 97%   Recent Labs     03/07/24  0350 03/08/24  0500 03/09/24  0641   WBC 8.5 8.8 9.5   RBC 3.18* 3.32* 3.53*   HEMOGLOBIN 9.7* 10.3* 10.8*   HEMATOCRIT 30.4* 31.3* 33.2*   MCV 95.6 94.3 94.1   MCH 30.5 31.0 30.6   MCHC 31.9* 32.9 32.5   RDW 46.6 45.6 44.6   PLATELETCT 255 254 301   MPV 9.4 9.7 9.5     Recent Labs     03/08/24  0500 03/08/24  1314 03/09/24  0641   SODIUM 144 139 139   POTASSIUM 3.7 4.1 4.0   CHLORIDE 105 102 101   CO2 27 24 26   GLUCOSE 244* 309* 292*   BUN 24* 25* 22   CREATININE 0.54 0.45* 0.54   CALCIUM 8.7 9.0 8.9     I/O last 3 completed shifts:  In: 1480 [NG/GT:1300]  Out: 2200 [Urine:2200]  Trach capped-voice good, slight stridor.     A: POD 8 trach for laryngeal fracture    P: Trach removed today.  FEES apparently today.  Diet pending FEES.  "

## 2024-03-09 NOTE — PROGRESS NOTES
Report received from previous shift RN.  Assessment complete.  Patient resting in bed comfortably, even and unlabored breathing present.  Prior trache site to neck, Gauze/Tape in place.  L chest dressing, CDI.  Tube feed running per Order.   All needs met at this time. Call light within reach. Hourly rounding in place.

## 2024-03-10 ENCOUNTER — APPOINTMENT (OUTPATIENT)
Dept: RADIOLOGY | Facility: MEDICAL CENTER | Age: 74
DRG: 004 | End: 2024-03-10
Attending: SURGERY
Payer: COMMERCIAL

## 2024-03-10 LAB
ALBUMIN SERPL BCP-MCNC: 3.2 G/DL (ref 3.2–4.9)
ALBUMIN/GLOB SERPL: 1 G/DL
ALP SERPL-CCNC: 142 U/L (ref 30–99)
ALT SERPL-CCNC: 174 U/L (ref 2–50)
ANION GAP SERPL CALC-SCNC: 11 MMOL/L (ref 7–16)
AST SERPL-CCNC: 61 U/L (ref 12–45)
BASOPHILS # BLD AUTO: 0.4 % (ref 0–1.8)
BASOPHILS # BLD: 0.04 K/UL (ref 0–0.12)
BILIRUB SERPL-MCNC: 0.4 MG/DL (ref 0.1–1.5)
BUN SERPL-MCNC: 21 MG/DL (ref 8–22)
CALCIUM ALBUM COR SERPL-MCNC: 9.4 MG/DL (ref 8.5–10.5)
CALCIUM SERPL-MCNC: 8.8 MG/DL (ref 8.5–10.5)
CHLORIDE SERPL-SCNC: 101 MMOL/L (ref 96–112)
CO2 SERPL-SCNC: 26 MMOL/L (ref 20–33)
CREAT SERPL-MCNC: 0.6 MG/DL (ref 0.5–1.4)
EOSINOPHIL # BLD AUTO: 0.22 K/UL (ref 0–0.51)
EOSINOPHIL NFR BLD: 2.4 % (ref 0–6.9)
ERYTHROCYTE [DISTWIDTH] IN BLOOD BY AUTOMATED COUNT: 43.7 FL (ref 35.9–50)
GFR SERPLBLD CREATININE-BSD FMLA CKD-EPI: 101 ML/MIN/1.73 M 2
GLOBULIN SER CALC-MCNC: 3.1 G/DL (ref 1.9–3.5)
GLUCOSE BLD STRIP.AUTO-MCNC: 192 MG/DL (ref 65–99)
GLUCOSE BLD STRIP.AUTO-MCNC: 232 MG/DL (ref 65–99)
GLUCOSE BLD STRIP.AUTO-MCNC: 242 MG/DL (ref 65–99)
GLUCOSE BLD STRIP.AUTO-MCNC: 253 MG/DL (ref 65–99)
GLUCOSE BLD STRIP.AUTO-MCNC: 275 MG/DL (ref 65–99)
GLUCOSE SERPL-MCNC: 269 MG/DL (ref 65–99)
GRAM STN SPEC: NORMAL
HCT VFR BLD AUTO: 31 % (ref 42–52)
HGB BLD-MCNC: 10.3 G/DL (ref 14–18)
IMM GRANULOCYTES # BLD AUTO: 0.03 K/UL (ref 0–0.11)
IMM GRANULOCYTES NFR BLD AUTO: 0.3 % (ref 0–0.9)
LYMPHOCYTES # BLD AUTO: 1.44 K/UL (ref 1–4.8)
LYMPHOCYTES NFR BLD: 15.9 % (ref 22–41)
MCH RBC QN AUTO: 30.9 PG (ref 27–33)
MCHC RBC AUTO-ENTMCNC: 33.2 G/DL (ref 32.3–36.5)
MCV RBC AUTO: 93.1 FL (ref 81.4–97.8)
MONOCYTES # BLD AUTO: 0.87 K/UL (ref 0–0.85)
MONOCYTES NFR BLD AUTO: 9.6 % (ref 0–13.4)
NEUTROPHILS # BLD AUTO: 6.45 K/UL (ref 1.82–7.42)
NEUTROPHILS NFR BLD: 71.4 % (ref 44–72)
NRBC # BLD AUTO: 0 K/UL
NRBC BLD-RTO: 0 /100 WBC (ref 0–0.2)
PLATELET # BLD AUTO: 324 K/UL (ref 164–446)
PMV BLD AUTO: 9.3 FL (ref 9–12.9)
POTASSIUM SERPL-SCNC: 3.9 MMOL/L (ref 3.6–5.5)
PROT SERPL-MCNC: 6.3 G/DL (ref 6–8.2)
RBC # BLD AUTO: 3.33 M/UL (ref 4.7–6.1)
SIGNIFICANT IND 70042: NORMAL
SITE SITE: NORMAL
SODIUM SERPL-SCNC: 138 MMOL/L (ref 135–145)
SOURCE SOURCE: NORMAL
WBC # BLD AUTO: 9.1 K/UL (ref 4.8–10.8)

## 2024-03-10 PROCEDURE — 700102 HCHG RX REV CODE 250 W/ 637 OVERRIDE(OP): Performed by: SURGERY

## 2024-03-10 PROCEDURE — 87186 SC STD MICRODIL/AGAR DIL: CPT

## 2024-03-10 PROCEDURE — 87205 SMEAR GRAM STAIN: CPT

## 2024-03-10 PROCEDURE — A9270 NON-COVERED ITEM OR SERVICE: HCPCS | Performed by: OTOLARYNGOLOGY

## 2024-03-10 PROCEDURE — 700102 HCHG RX REV CODE 250 W/ 637 OVERRIDE(OP): Performed by: OTOLARYNGOLOGY

## 2024-03-10 PROCEDURE — 85025 COMPLETE CBC W/AUTO DIFF WBC: CPT

## 2024-03-10 PROCEDURE — A9270 NON-COVERED ITEM OR SERVICE: HCPCS

## 2024-03-10 PROCEDURE — 700102 HCHG RX REV CODE 250 W/ 637 OVERRIDE(OP): Performed by: NURSE PRACTITIONER

## 2024-03-10 PROCEDURE — A9270 NON-COVERED ITEM OR SERVICE: HCPCS | Performed by: INTERNAL MEDICINE

## 2024-03-10 PROCEDURE — 700102 HCHG RX REV CODE 250 W/ 637 OVERRIDE(OP)

## 2024-03-10 PROCEDURE — 71045 X-RAY EXAM CHEST 1 VIEW: CPT

## 2024-03-10 PROCEDURE — A9270 NON-COVERED ITEM OR SERVICE: HCPCS | Performed by: SURGERY

## 2024-03-10 PROCEDURE — 87077 CULTURE AEROBIC IDENTIFY: CPT

## 2024-03-10 PROCEDURE — A9270 NON-COVERED ITEM OR SERVICE: HCPCS | Performed by: NURSE PRACTITIONER

## 2024-03-10 PROCEDURE — 36415 COLL VENOUS BLD VENIPUNCTURE: CPT

## 2024-03-10 PROCEDURE — 99232 SBSQ HOSP IP/OBS MODERATE 35: CPT

## 2024-03-10 PROCEDURE — 82962 GLUCOSE BLOOD TEST: CPT | Mod: 91

## 2024-03-10 PROCEDURE — 770001 HCHG ROOM/CARE - MED/SURG/GYN PRIV*

## 2024-03-10 PROCEDURE — 80053 COMPREHEN METABOLIC PANEL: CPT

## 2024-03-10 PROCEDURE — 87070 CULTURE OTHR SPECIMN AEROBIC: CPT

## 2024-03-10 PROCEDURE — 700102 HCHG RX REV CODE 250 W/ 637 OVERRIDE(OP): Performed by: STUDENT IN AN ORGANIZED HEALTH CARE EDUCATION/TRAINING PROGRAM

## 2024-03-10 PROCEDURE — A9270 NON-COVERED ITEM OR SERVICE: HCPCS | Performed by: STUDENT IN AN ORGANIZED HEALTH CARE EDUCATION/TRAINING PROGRAM

## 2024-03-10 PROCEDURE — 700102 HCHG RX REV CODE 250 W/ 637 OVERRIDE(OP): Performed by: INTERNAL MEDICINE

## 2024-03-10 RX ADMIN — RIVAROXABAN 20 MG: 20 TABLET, FILM COATED ORAL at 17:11

## 2024-03-10 RX ADMIN — POLYETHYLENE GLYCOL 3350 1 PACKET: 17 POWDER, FOR SOLUTION ORAL at 05:41

## 2024-03-10 RX ADMIN — LEVOTHYROXINE SODIUM 75 MCG: 0.07 TABLET ORAL at 05:41

## 2024-03-10 RX ADMIN — CELECOXIB 200 MG: 200 CAPSULE ORAL at 05:41

## 2024-03-10 RX ADMIN — CEPHALEXIN 500 MG: 250 FOR SUSPENSION ORAL at 13:02

## 2024-03-10 RX ADMIN — DOCUSATE SODIUM 100 MG: 50 LIQUID ORAL at 05:41

## 2024-03-10 RX ADMIN — GABAPENTIN 300 MG: 300 CAPSULE ORAL at 05:41

## 2024-03-10 RX ADMIN — CEPHALEXIN 500 MG: 250 FOR SUSPENSION ORAL at 21:18

## 2024-03-10 RX ADMIN — METOPROLOL TARTRATE 25 MG: 25 TABLET, FILM COATED ORAL at 17:13

## 2024-03-10 RX ADMIN — ROSUVASTATIN CALCIUM 20 MG: 20 TABLET, FILM COATED ORAL at 05:41

## 2024-03-10 RX ADMIN — TERAZOSIN HYDROCHLORIDE 2 MG: 2 CAPSULE ORAL at 17:11

## 2024-03-10 RX ADMIN — GLYCOPYRROLATE 1 MG: 1 TABLET ORAL at 17:11

## 2024-03-10 RX ADMIN — DOCUSATE SODIUM 50 MG AND SENNOSIDES 8.6 MG 1 TABLET: 8.6; 5 TABLET, FILM COATED ORAL at 21:22

## 2024-03-10 RX ADMIN — GLYCOPYRROLATE 1 MG: 1 TABLET ORAL at 13:02

## 2024-03-10 RX ADMIN — DOCUSATE SODIUM 100 MG: 50 LIQUID ORAL at 17:11

## 2024-03-10 RX ADMIN — GABAPENTIN 300 MG: 300 CAPSULE ORAL at 17:11

## 2024-03-10 RX ADMIN — ASPIRIN 81 MG 81 MG: 81 TABLET ORAL at 05:41

## 2024-03-10 RX ADMIN — QUETIAPINE FUMARATE 50 MG: 25 TABLET ORAL at 21:22

## 2024-03-10 RX ADMIN — MAGNESIUM HYDROXIDE 30 ML: 1200 LIQUID ORAL at 05:41

## 2024-03-10 RX ADMIN — LISINOPRIL 5 MG: 5 TABLET ORAL at 05:41

## 2024-03-10 RX ADMIN — GLYCOPYRROLATE 1 MG: 1 TABLET ORAL at 05:41

## 2024-03-10 RX ADMIN — METOPROLOL TARTRATE 25 MG: 25 TABLET, FILM COATED ORAL at 05:41

## 2024-03-10 RX ADMIN — LANSOPRAZOLE 30 MG: 30 TABLET, ORALLY DISINTEGRATING, DELAYED RELEASE ORAL at 05:41

## 2024-03-10 RX ADMIN — CEPHALEXIN 500 MG: 250 FOR SUSPENSION ORAL at 05:41

## 2024-03-10 ASSESSMENT — ENCOUNTER SYMPTOMS
CHILLS: 0
MYALGIAS: 0
FEVER: 0
SHORTNESS OF BREATH: 0
COUGH: 0

## 2024-03-10 ASSESSMENT — COGNITIVE AND FUNCTIONAL STATUS - GENERAL
MOVING TO AND FROM BED TO CHAIR: A LITTLE
TURNING FROM BACK TO SIDE WHILE IN FLAT BAD: A LITTLE
SUGGESTED CMS G CODE MODIFIER DAILY ACTIVITY: CH
STANDING UP FROM CHAIR USING ARMS: A LITTLE
SUGGESTED CMS G CODE MODIFIER MOBILITY: CK
DAILY ACTIVITIY SCORE: 24
MOBILITY SCORE: 18
WALKING IN HOSPITAL ROOM: A LITTLE
MOVING FROM LYING ON BACK TO SITTING ON SIDE OF FLAT BED: A LITTLE
CLIMB 3 TO 5 STEPS WITH RAILING: A LITTLE

## 2024-03-10 ASSESSMENT — PAIN DESCRIPTION - PAIN TYPE
TYPE: ACUTE PAIN;SURGICAL PAIN
TYPE: ACUTE PAIN;SURGICAL PAIN

## 2024-03-10 NOTE — CARE PLAN
The patient is Watcher - Medium risk of patient condition declining or worsening    Shift Goals  Clinical Goals: Ambulation/ Oral Care/ Pain Mgt/IS  Patient Goals: Ambulation/ Pain Mgt/ FEEs  Family Goals: updates    Progress made toward(s) clinical / shift goals:      Problem: Knowledge Deficit - Standard  Goal: Patient and family/care givers will demonstrate understanding of plan of care, disease process/condition, diagnostic tests and medications  Outcome: Progressing     Problem: Respiratory  Goal: Patient will achieve/maintain optimum respiratory ventilation and gas exchange  Outcome: Progressing     Problem: Pain - Standard  Goal: Alleviation of pain or a reduction in pain to the patient’s comfort goal  Outcome: Progressing       Patient is not progressing towards the following goals:      Problem: Wound/ / Incision Healing  Goal: Patient's wound/surgical incision will decrease in size and heals properly  Outcome: Not Progressing, trach suture site dressing change TID, pending wound culture orders

## 2024-03-10 NOTE — CARE PLAN
Problem: Respiratory  Goal: Patient will achieve/maintain optimum respiratory ventilation and gas exchange  Description: Target End Date:  Prior to discharge or change in level of care    Document on Assessment flowsheet    1.  Assess and monitor rate, rhythm, depth and effort of respiration  2.  Breath sounds assessed qshift and/or as needed  3.  Assess O2 saturation, administer/titrate oxygen as ordered  4.  Position patient for maximum ventilatory efficiency  5.  Turn, cough, and deep breath with splinting to improve effectiveness  6.  Collaborate with RT to administer medication/treatments per order  7.  Encourage use of incentive spirometer and encourage patient to cough after use and utilize splinting techniques if applicable  8.  Airway suctioning  9.  Monitor sputum production for changes in color, consistency and frequency  10. Perform frequent oral hygiene  11. Alternate physical activity with rest periods  Outcome: Progressing     Problem: Fall Risk  Goal: Patient will remain free from falls  Description: Target End Date:  Prior to discharge or change in level of care    Document interventions on the Irlanda Mendez Fall Risk Assessment    1.  Assess for fall risk factors  2.  Implement fall precautions  Outcome: Progressing     The patient is Watcher - Medium risk of patient condition declining or worsening    Shift Goals  Clinical Goals: FEES, transition to PO nutrition  Patient Goals: PO diet  Family Goals: updates    Progress made toward(s) clinical / shift goals:  Trache removed by ENT 3/9. FEES swallow eval pending    Patient is not progressing towards the following goals:

## 2024-03-10 NOTE — PROGRESS NOTES
Trauma / Surgical Daily Progress Note    Date of Service  3/10/2024    Chief Complaint  74 y.o. male admitted 2/27/2024 with laryngeal injury after ground level fall.     Interval Events  No critical events overnight.  Adequate pain control.  Supplemental oxygen, 1 L nasal cannula.  Trach has purulent secretions and a foul smell to it.    -FEEs pending.  -Pulmonary hygiene.  -Mobilize patient.  -Advance bowel protocol.  -Consider resuming metformin with initiation of diet.  Disposition: Anticipate discharge home upon medical clearance.  Awaiting FEEs to be completed.    Review of Systems  Review of Systems   Constitutional:  Negative for chills, fever and malaise/fatigue.   Respiratory:  Negative for cough and shortness of breath.    Genitourinary:  Negative for dysuria.   Musculoskeletal:  Negative for myalgias.   All other systems reviewed and are negative.       Vital Signs  Temp:  [36.4 °C (97.5 °F)-37.1 °C (98.8 °F)] 36.4 °C (97.5 °F)  Pulse:  [74-79] 79  Resp:  [18] 18  BP: (161-169)/(67-91) 162/91  SpO2:  [88 %-97 %] 96 %    Physical Exam  Physical Exam  Vitals and nursing note reviewed.   Constitutional:       General: He is not in acute distress.     Appearance: He is overweight.      Interventions: Nasal cannula in place.   HENT:      Mouth/Throat:      Mouth: Mucous membranes are moist.      Pharynx: Oropharynx is clear.   Eyes:      Pupils: Pupils are equal, round, and reactive to light.   Neck:      Comments: Tracheostomy site with purulent secretions, foul smell.  Cardiovascular:      Rate and Rhythm: Normal rate and regular rhythm.      Pulses: Normal pulses.   Pulmonary:      Effort: No respiratory distress.      Breath sounds: Decreased breath sounds present.      Comments: Tracheostomy site covered with dressing  Abdominal:      General: There is no distension.      Palpations: Abdomen is soft.      Tenderness: There is no abdominal tenderness.   Musculoskeletal:         General: Normal range of  motion.      Cervical back: Normal range of motion.   Skin:     General: Skin is warm and dry.      Capillary Refill: Capillary refill takes 2 to 3 seconds.   Neurological:      General: No focal deficit present.      Mental Status: He is oriented to person, place, and time.   Psychiatric:         Mood and Affect: Mood normal.         Behavior: Behavior is cooperative.         Judgment: Judgment normal.         Laboratory  Recent Results (from the past 24 hour(s))   POCT glucose device results    Collection Time: 03/09/24 12:28 PM   Result Value Ref Range    POC Glucose, Blood 283 (H) 65 - 99 mg/dL   POCT glucose device results    Collection Time: 03/09/24  5:26 PM   Result Value Ref Range    POC Glucose, Blood 232 (H) 65 - 99 mg/dL   POCT glucose device results    Collection Time: 03/10/24 12:54 AM   Result Value Ref Range    POC Glucose, Blood 242 (H) 65 - 99 mg/dL   CBC with Differential: Tomorrow AM    Collection Time: 03/10/24  1:41 AM   Result Value Ref Range    WBC 9.1 4.8 - 10.8 K/uL    RBC 3.33 (L) 4.70 - 6.10 M/uL    Hemoglobin 10.3 (L) 14.0 - 18.0 g/dL    Hematocrit 31.0 (L) 42.0 - 52.0 %    MCV 93.1 81.4 - 97.8 fL    MCH 30.9 27.0 - 33.0 pg    MCHC 33.2 32.3 - 36.5 g/dL    RDW 43.7 35.9 - 50.0 fL    Platelet Count 324 164 - 446 K/uL    MPV 9.3 9.0 - 12.9 fL    Neutrophils-Polys 71.40 44.00 - 72.00 %    Lymphocytes 15.90 (L) 22.00 - 41.00 %    Monocytes 9.60 0.00 - 13.40 %    Eosinophils 2.40 0.00 - 6.90 %    Basophils 0.40 0.00 - 1.80 %    Immature Granulocytes 0.30 0.00 - 0.90 %    Nucleated RBC 0.00 0.00 - 0.20 /100 WBC    Neutrophils (Absolute) 6.45 1.82 - 7.42 K/uL    Lymphs (Absolute) 1.44 1.00 - 4.80 K/uL    Monos (Absolute) 0.87 (H) 0.00 - 0.85 K/uL    Eos (Absolute) 0.22 0.00 - 0.51 K/uL    Baso (Absolute) 0.04 0.00 - 0.12 K/uL    Immature Granulocytes (abs) 0.03 0.00 - 0.11 K/uL    NRBC (Absolute) 0.00 K/uL   Comp Metabolic Panel (CMP): Tomorrow AM    Collection Time: 03/10/24  1:41 AM   Result  Value Ref Range    Sodium 138 135 - 145 mmol/L    Potassium 3.9 3.6 - 5.5 mmol/L    Chloride 101 96 - 112 mmol/L    Co2 26 20 - 33 mmol/L    Anion Gap 11.0 7.0 - 16.0    Glucose 269 (H) 65 - 99 mg/dL    Bun 21 8 - 22 mg/dL    Creatinine 0.60 0.50 - 1.40 mg/dL    Calcium 8.8 8.5 - 10.5 mg/dL    Correct Calcium 9.4 8.5 - 10.5 mg/dL    AST(SGOT) 61 (H) 12 - 45 U/L    ALT(SGPT) 174 (H) 2 - 50 U/L    Alkaline Phosphatase 142 (H) 30 - 99 U/L    Total Bilirubin 0.4 0.1 - 1.5 mg/dL    Albumin 3.2 3.2 - 4.9 g/dL    Total Protein 6.3 6.0 - 8.2 g/dL    Globulin 3.1 1.9 - 3.5 g/dL    A-G Ratio 1.0 g/dL   ESTIMATED GFR    Collection Time: 03/10/24  1:41 AM   Result Value Ref Range    GFR (CKD-EPI) 101 >60 mL/min/1.73 m 2   POCT glucose device results    Collection Time: 03/10/24  6:03 AM   Result Value Ref Range    POC Glucose, Blood 275 (H) 65 - 99 mg/dL       Fluids    Intake/Output Summary (Last 24 hours) at 3/10/2024 1317  Last data filed at 3/10/2024 0759  Gross per 24 hour   Intake 120 ml   Output 1300 ml   Net -1180 ml       Core Measures & Quality Metrics  Labs reviewed, Radiology images reviewed and Medications reviewed  Brooke catheter: No Brooke      DVT Prophylaxis: Xarelto  DVT prophylaxis - mechanical: SCDs  Ulcer prophylaxis: Not indicated    Assessed for rehab: Patient returned to prior level of function, rehabilitation not indicated at this time    RAP Score Total: 10    CAGE Results: not completed Blood Alcohol>0.08: no       Assessment/Plan  * Trauma- (present on admission)  Assessment & Plan  Walked into gas station stated he didn't feel well then had syncopal episode.  Trauma Red Transfer Activation from [facility] in [place], [place].  Taz Ward MD. Trauma Surgery.    Elevated troponin- (present on admission)  Assessment & Plan  Admission troponin level 354.  EKG with SR without acute changes.  3/3 Cardiology recommendation:  Heparin gtt, ASA 81, and Xarelto when feasible.  Secretions  from trach are no longer bloody.  Heparin gtt and ASA started  3/8 Continues on Xarelto  Otis Ni MD, Cardiology    Fracture closed, thyroid cartilage, initial encounter ([facility])- (present on admission)  Assessment & Plan  CT at [place] showing mildly displaced fracture of the left para midline aspect of the thyroid cartilage.  Bronchoscopy in ICU without evidence of tracheobronchial injury distal to the endotracheal tube however bloody sections noted proximally.  CT neck with thyroid cartilage fracture & suspect injury to cricoid cartilage.  3/2 Tracheostomy placement. Direct laryngoscopy and tracheoscopy.  3/9 Trach removed by Dr. Contreras.  Flaco Contreras MD. [facility].    Pneumomediastinum ([facility])- (present on admission)  Assessment & Plan  Extensive gas is noted within the superficial and deep soft tissue planes of the upper chest and neck.  Opacification of a 4 cm segment of the trachea, distal to the cords, surrounding the endotracheal tube is present, and could represent a manifestation of tracheal injury.  CT neck with subcutaneous emphysema & pneumomediastinum.  ENT and GI consulted for concern for tracheal/esophageal injuries.  Flaco Contreras MD. [facility].  Don Armstrong MD. [facility].    Respiratory failure following trauma ([facility])- (present on admission)  Assessment & Plan  Intubated at [place].  3/1 Endoscopic evaluation of airway and trach placement today with ENT  3/2  Switch to trach collar, tolerating  3/3 Tolerating t piece.  3/6 Downsized to 6 noncuffed by ENT.  Keflex initiated for purulent drainage, 7 days.  3/9 Trach removed by Dr. Contreras. FEEs pending.    Tracheostomy to be managed by ENT.  Not to be decannulated this admission.  Can follow up with ENT as an outpatient for decannulation when appropriate    Fever  Assessment & Plan  Temperature 100-101 F, responding to tylenol.  Signficant secretion burden, likely from this, no clearly  purulent, normal wbc count.  Continue to monitor and treat with tylenol for now.    Syncope and collapse- (present on admission)  Assessment & Plan  EKG sinus rhythm & left anterior fascicular block.  Trend troponin.   Echocardiogram with LVEF 50%.  Continuous cardiac monitoring  3/1 Some ectopy overnight self limited and seems to be asymptomatic, reviewed electrolytes WNL    Pneumothorax on left- (present on admission)  Assessment & Plan  Left chest tube placed by [facility].  2/29 Water seal chest tube.  3/1 Will keep chest tube until definitive management of airway  3/2 CXR in AM, if trace pneumothorax is stable, remove chest tube  3/3 CXR without pneumo, chest tube removal.  Serial CXRs    History of chronic CHF- (present on admission)  Assessment & Plan  Per chart review.   History of dilated cardiomyopathy  2/28 Echocardiogram with LVEF 50%.    Diabetes (HCC)- (present on admission)  Assessment & Plan  Chronic condition treated with Insulin and metformin.  Holding maintenance metformin for 48 hours following intravenous contrast administration.  Insulin sliding scale coverage.  3/1 15 u lantus resumed.   3/2 lantus increase to 25 u  3/3 Start insulin gtt  3/5 Insulin gtt stopped.  3/6 Basilar insulin increased to 35 units qAM.    A-fib (HCC)- (present on admission)  Assessment & Plan  Chronic condition treated with Xarelto and Toprol Xl  EKG sinus rhythm.  Toprol XL resumed on admit.  Systemic anticoagulation held on admission.  3/8 Continued Xarelto    GERD (gastroesophageal reflux disease)- (present on admission)  Assessment & Plan  Chronic condition treated with omeprazole.  Holding maintenance medication during acute traumatic illness.  Lansoprasole via enteral tube.    Urinary retention- (present on admission)  Assessment & Plan  Chronic condition treated with tamsulosin.  Holding maintenance medication during acute traumatic illness. Consider initiation when tolerating PO.    Primary hypertension- (present  on admission)  Assessment & Plan  Chronic condition treated with lisinopril.  3/6 Resumed maintenance medication.    Hypothyroid- (present on admission)  Assessment & Plan  Chronic condition treated with levothyroxine.  3/6 Resumed maintenance medication.    No contraindication to deep vein thrombosis (DVT) prophylaxis- (present on admission)  Assessment & Plan  2/29 Prophylactic dose enoxaparin 40 mg BID initiated.     High cholesterol- (present on admission)  Assessment & Plan  Chronic condition treated with Crestor.  Resumed maintenance medication on admission.        Discussed patient condition with RN, Patient, and trauma surgery, Dr Ward.

## 2024-03-10 NOTE — PROGRESS NOTES
Bedside report received, assessment completed    A&O x  4, pt calls appropriately  Mobility: Up with SBA, FWW  Fall Risk Assessment: moderate, bed alarm yes, door notifications yes  Pain Assessment / Reassessment completed, medication provided per MAR  Diet: NPO,   LDA:   IV Access: 20g, CDI/ flushed/ SL  Trach Stoma: dressing intact, purulent drainage, wound culture obtained     GI/: urinal/ bathroom void, + flatus, + 3/10 BM  DVT Prophylaxis: ASA 81mg, Xarelto SCD on while in bed   Mark Score: 18, Interventions per flow sheet  Procedures:    - 3/1 Tracheostomy creation   - 3/9 Trach removed  D/C Plan:    - Pending Trach cultures, collected 3/10   - Pending FEES - reached out to SLP today for possible ETA    - Pulmonary hygiene, wean supplemental O2    Reviewed plan of care with patient, bed in lowest position and locked, pt resting comfortably now, call light within reach, all needs met at this time. Interventions will be executed per plan of care

## 2024-03-11 ENCOUNTER — PATIENT OUTREACH (OUTPATIENT)
Dept: SCHEDULING | Facility: IMAGING CENTER | Age: 74
End: 2024-03-11
Payer: MEDICARE

## 2024-03-11 ENCOUNTER — APPOINTMENT (OUTPATIENT)
Dept: RADIOLOGY | Facility: MEDICAL CENTER | Age: 74
DRG: 004 | End: 2024-03-11
Attending: SURGERY
Payer: COMMERCIAL

## 2024-03-11 LAB
ALBUMIN SERPL BCP-MCNC: 3.1 G/DL (ref 3.2–4.9)
ALBUMIN/GLOB SERPL: 0.9 G/DL
ALP SERPL-CCNC: 138 U/L (ref 30–99)
ALT SERPL-CCNC: 136 U/L (ref 2–50)
ANION GAP SERPL CALC-SCNC: 12 MMOL/L (ref 7–16)
AST SERPL-CCNC: 43 U/L (ref 12–45)
BASOPHILS # BLD AUTO: 0.7 % (ref 0–1.8)
BASOPHILS # BLD: 0.06 K/UL (ref 0–0.12)
BILIRUB SERPL-MCNC: 0.4 MG/DL (ref 0.1–1.5)
BUN SERPL-MCNC: 21 MG/DL (ref 8–22)
CALCIUM ALBUM COR SERPL-MCNC: 9.5 MG/DL (ref 8.5–10.5)
CALCIUM SERPL-MCNC: 8.8 MG/DL (ref 8.5–10.5)
CHLORIDE SERPL-SCNC: 99 MMOL/L (ref 96–112)
CO2 SERPL-SCNC: 24 MMOL/L (ref 20–33)
CREAT SERPL-MCNC: 0.55 MG/DL (ref 0.5–1.4)
EOSINOPHIL # BLD AUTO: 0.21 K/UL (ref 0–0.51)
EOSINOPHIL NFR BLD: 2.4 % (ref 0–6.9)
ERYTHROCYTE [DISTWIDTH] IN BLOOD BY AUTOMATED COUNT: 43.8 FL (ref 35.9–50)
GFR SERPLBLD CREATININE-BSD FMLA CKD-EPI: 103 ML/MIN/1.73 M 2
GLOBULIN SER CALC-MCNC: 3.4 G/DL (ref 1.9–3.5)
GLUCOSE BLD STRIP.AUTO-MCNC: 169 MG/DL (ref 65–99)
GLUCOSE BLD STRIP.AUTO-MCNC: 265 MG/DL (ref 65–99)
GLUCOSE BLD STRIP.AUTO-MCNC: 276 MG/DL (ref 65–99)
GLUCOSE SERPL-MCNC: 271 MG/DL (ref 65–99)
HCT VFR BLD AUTO: 33.5 % (ref 42–52)
HGB BLD-MCNC: 11.2 G/DL (ref 14–18)
IMM GRANULOCYTES # BLD AUTO: 0.05 K/UL (ref 0–0.11)
IMM GRANULOCYTES NFR BLD AUTO: 0.6 % (ref 0–0.9)
LYMPHOCYTES # BLD AUTO: 1.41 K/UL (ref 1–4.8)
LYMPHOCYTES NFR BLD: 16.2 % (ref 22–41)
MAGNESIUM SERPL-MCNC: 1.9 MG/DL (ref 1.5–2.5)
MCH RBC QN AUTO: 30.9 PG (ref 27–33)
MCHC RBC AUTO-ENTMCNC: 33.4 G/DL (ref 32.3–36.5)
MCV RBC AUTO: 92.3 FL (ref 81.4–97.8)
MONOCYTES # BLD AUTO: 0.86 K/UL (ref 0–0.85)
MONOCYTES NFR BLD AUTO: 9.9 % (ref 0–13.4)
NEUTROPHILS # BLD AUTO: 6.14 K/UL (ref 1.82–7.42)
NEUTROPHILS NFR BLD: 70.2 % (ref 44–72)
NRBC # BLD AUTO: 0 K/UL
NRBC BLD-RTO: 0 /100 WBC (ref 0–0.2)
PHOSPHATE SERPL-MCNC: 3.1 MG/DL (ref 2.5–4.5)
PLATELET # BLD AUTO: 360 K/UL (ref 164–446)
PMV BLD AUTO: 9.3 FL (ref 9–12.9)
POTASSIUM SERPL-SCNC: 4.2 MMOL/L (ref 3.6–5.5)
PROT SERPL-MCNC: 6.5 G/DL (ref 6–8.2)
RBC # BLD AUTO: 3.63 M/UL (ref 4.7–6.1)
SODIUM SERPL-SCNC: 135 MMOL/L (ref 135–145)
WBC # BLD AUTO: 8.7 K/UL (ref 4.8–10.8)

## 2024-03-11 PROCEDURE — A9270 NON-COVERED ITEM OR SERVICE: HCPCS | Performed by: SURGERY

## 2024-03-11 PROCEDURE — 97535 SELF CARE MNGMENT TRAINING: CPT

## 2024-03-11 PROCEDURE — 97166 OT EVAL MOD COMPLEX 45 MIN: CPT

## 2024-03-11 PROCEDURE — A9270 NON-COVERED ITEM OR SERVICE: HCPCS | Performed by: NURSE PRACTITIONER

## 2024-03-11 PROCEDURE — A9270 NON-COVERED ITEM OR SERVICE: HCPCS

## 2024-03-11 PROCEDURE — 700102 HCHG RX REV CODE 250 W/ 637 OVERRIDE(OP)

## 2024-03-11 PROCEDURE — A9270 NON-COVERED ITEM OR SERVICE: HCPCS | Performed by: STUDENT IN AN ORGANIZED HEALTH CARE EDUCATION/TRAINING PROGRAM

## 2024-03-11 PROCEDURE — 700102 HCHG RX REV CODE 250 W/ 637 OVERRIDE(OP): Performed by: SURGERY

## 2024-03-11 PROCEDURE — 82962 GLUCOSE BLOOD TEST: CPT

## 2024-03-11 PROCEDURE — A9270 NON-COVERED ITEM OR SERVICE: HCPCS | Performed by: INTERNAL MEDICINE

## 2024-03-11 PROCEDURE — 84100 ASSAY OF PHOSPHORUS: CPT

## 2024-03-11 PROCEDURE — 700102 HCHG RX REV CODE 250 W/ 637 OVERRIDE(OP): Performed by: OTOLARYNGOLOGY

## 2024-03-11 PROCEDURE — 71045 X-RAY EXAM CHEST 1 VIEW: CPT

## 2024-03-11 PROCEDURE — 92612 ENDOSCOPY SWALLOW (FEES) VID: CPT

## 2024-03-11 PROCEDURE — 80053 COMPREHEN METABOLIC PANEL: CPT

## 2024-03-11 PROCEDURE — 85025 COMPLETE CBC W/AUTO DIFF WBC: CPT

## 2024-03-11 PROCEDURE — 83735 ASSAY OF MAGNESIUM: CPT

## 2024-03-11 PROCEDURE — 700102 HCHG RX REV CODE 250 W/ 637 OVERRIDE(OP): Performed by: INTERNAL MEDICINE

## 2024-03-11 PROCEDURE — 97162 PT EVAL MOD COMPLEX 30 MIN: CPT

## 2024-03-11 PROCEDURE — 700102 HCHG RX REV CODE 250 W/ 637 OVERRIDE(OP): Performed by: NURSE PRACTITIONER

## 2024-03-11 PROCEDURE — 700102 HCHG RX REV CODE 250 W/ 637 OVERRIDE(OP): Performed by: STUDENT IN AN ORGANIZED HEALTH CARE EDUCATION/TRAINING PROGRAM

## 2024-03-11 PROCEDURE — 770001 HCHG ROOM/CARE - MED/SURG/GYN PRIV*

## 2024-03-11 PROCEDURE — A9270 NON-COVERED ITEM OR SERVICE: HCPCS | Performed by: OTOLARYNGOLOGY

## 2024-03-11 PROCEDURE — 99232 SBSQ HOSP IP/OBS MODERATE 35: CPT

## 2024-03-11 RX ORDER — CELECOXIB 200 MG/1
200 CAPSULE ORAL DAILY
Status: DISCONTINUED | OUTPATIENT
Start: 2024-03-12 | End: 2024-03-12 | Stop reason: HOSPADM

## 2024-03-11 RX ORDER — AMOXICILLIN 250 MG
1 CAPSULE ORAL
Status: DISCONTINUED | OUTPATIENT
Start: 2024-03-11 | End: 2024-03-12 | Stop reason: HOSPADM

## 2024-03-11 RX ORDER — TERAZOSIN 2 MG/1
2 CAPSULE ORAL EVERY EVENING
Status: DISCONTINUED | OUTPATIENT
Start: 2024-03-11 | End: 2024-03-12 | Stop reason: HOSPADM

## 2024-03-11 RX ORDER — GABAPENTIN 300 MG/1
300 CAPSULE ORAL 2 TIMES DAILY
Status: DISCONTINUED | OUTPATIENT
Start: 2024-03-11 | End: 2024-03-12 | Stop reason: HOSPADM

## 2024-03-11 RX ORDER — ASPIRIN 81 MG/1
81 TABLET, CHEWABLE ORAL DAILY
Status: DISCONTINUED | OUTPATIENT
Start: 2024-03-12 | End: 2024-03-12 | Stop reason: HOSPADM

## 2024-03-11 RX ORDER — QUETIAPINE FUMARATE 25 MG/1
25 TABLET, FILM COATED ORAL NIGHTLY
Status: DISCONTINUED | OUTPATIENT
Start: 2024-03-11 | End: 2024-03-11

## 2024-03-11 RX ORDER — AMOXICILLIN 250 MG
1 CAPSULE ORAL NIGHTLY
Status: DISCONTINUED | OUTPATIENT
Start: 2024-03-11 | End: 2024-03-12 | Stop reason: HOSPADM

## 2024-03-11 RX ORDER — QUETIAPINE FUMARATE 25 MG/1
25 TABLET, FILM COATED ORAL NIGHTLY
Status: DISCONTINUED | OUTPATIENT
Start: 2024-03-11 | End: 2024-03-12 | Stop reason: HOSPADM

## 2024-03-11 RX ORDER — POLYETHYLENE GLYCOL 3350 17 G/17G
1 POWDER, FOR SOLUTION ORAL 2 TIMES DAILY
Status: DISCONTINUED | OUTPATIENT
Start: 2024-03-11 | End: 2024-03-12 | Stop reason: HOSPADM

## 2024-03-11 RX ORDER — CEPHALEXIN 250 MG/5ML
500 POWDER, FOR SUSPENSION ORAL EVERY 8 HOURS
Status: DISCONTINUED | OUTPATIENT
Start: 2024-03-11 | End: 2024-03-12 | Stop reason: HOSPADM

## 2024-03-11 RX ORDER — GLYCOPYRROLATE 1 MG/1
1 TABLET ORAL 3 TIMES DAILY
Status: DISCONTINUED | OUTPATIENT
Start: 2024-03-11 | End: 2024-03-11

## 2024-03-11 RX ORDER — ROSUVASTATIN CALCIUM 20 MG/1
20 TABLET, COATED ORAL DAILY
Status: DISCONTINUED | OUTPATIENT
Start: 2024-03-12 | End: 2024-03-12 | Stop reason: HOSPADM

## 2024-03-11 RX ORDER — LISINOPRIL 5 MG/1
5 TABLET ORAL
Status: DISCONTINUED | OUTPATIENT
Start: 2024-03-12 | End: 2024-03-12 | Stop reason: HOSPADM

## 2024-03-11 RX ORDER — ONDANSETRON 4 MG/1
4 TABLET, ORALLY DISINTEGRATING ORAL EVERY 4 HOURS PRN
Status: DISCONTINUED | OUTPATIENT
Start: 2024-03-11 | End: 2024-03-12 | Stop reason: HOSPADM

## 2024-03-11 RX ORDER — DOCUSATE SODIUM 50 MG/5ML
100 LIQUID ORAL 2 TIMES DAILY
Status: DISCONTINUED | OUTPATIENT
Start: 2024-03-11 | End: 2024-03-12 | Stop reason: HOSPADM

## 2024-03-11 RX ADMIN — ROSUVASTATIN CALCIUM 20 MG: 20 TABLET, FILM COATED ORAL at 04:28

## 2024-03-11 RX ADMIN — CEPHALEXIN 500 MG: 250 FOR SUSPENSION ORAL at 16:23

## 2024-03-11 RX ADMIN — LEVOTHYROXINE SODIUM 75 MCG: 0.07 TABLET ORAL at 04:28

## 2024-03-11 RX ADMIN — METOPROLOL TARTRATE 25 MG: 25 TABLET, FILM COATED ORAL at 04:28

## 2024-03-11 RX ADMIN — RIVAROXABAN 20 MG: 20 TABLET, FILM COATED ORAL at 18:12

## 2024-03-11 RX ADMIN — GABAPENTIN 300 MG: 300 CAPSULE ORAL at 04:28

## 2024-03-11 RX ADMIN — DOCUSATE SODIUM 100 MG: 50 LIQUID ORAL at 18:12

## 2024-03-11 RX ADMIN — METOPROLOL TARTRATE 25 MG: 25 TABLET, FILM COATED ORAL at 18:12

## 2024-03-11 RX ADMIN — DOCUSATE SODIUM 100 MG: 50 LIQUID ORAL at 04:28

## 2024-03-11 RX ADMIN — LISINOPRIL 5 MG: 5 TABLET ORAL at 04:28

## 2024-03-11 RX ADMIN — LANSOPRAZOLE 30 MG: 30 TABLET, ORALLY DISINTEGRATING, DELAYED RELEASE ORAL at 04:28

## 2024-03-11 RX ADMIN — CEPHALEXIN 500 MG: 250 FOR SUSPENSION ORAL at 20:05

## 2024-03-11 RX ADMIN — CELECOXIB 200 MG: 200 CAPSULE ORAL at 04:28

## 2024-03-11 RX ADMIN — GLYCOPYRROLATE 1 MG: 1 TABLET ORAL at 04:28

## 2024-03-11 RX ADMIN — GABAPENTIN 300 MG: 300 CAPSULE ORAL at 18:12

## 2024-03-11 RX ADMIN — METFORMIN HYDROCHLORIDE 1000 MG: 500 TABLET ORAL at 18:12

## 2024-03-11 RX ADMIN — QUETIAPINE FUMARATE 25 MG: 25 TABLET ORAL at 20:04

## 2024-03-11 RX ADMIN — ASPIRIN 81 MG 81 MG: 81 TABLET ORAL at 04:28

## 2024-03-11 RX ADMIN — CEPHALEXIN 500 MG: 250 FOR SUSPENSION ORAL at 04:28

## 2024-03-11 RX ADMIN — TERAZOSIN 2 MG: 2 CAPSULE ORAL at 18:12

## 2024-03-11 RX ADMIN — POLYETHYLENE GLYCOL 3350 1 PACKET: 17 POWDER, FOR SOLUTION ORAL at 04:28

## 2024-03-11 RX ADMIN — GLYCOPYRROLATE 1 MG: 1 TABLET ORAL at 11:58

## 2024-03-11 ASSESSMENT — GAIT ASSESSMENTS
ASSISTIVE DEVICE: FRONT WHEEL WALKER
DEVIATION: BRADYKINETIC;SHUFFLED GAIT
DISTANCE (FEET): 75
GAIT LEVEL OF ASSIST: CONTACT GUARD ASSIST

## 2024-03-11 ASSESSMENT — COGNITIVE AND FUNCTIONAL STATUS - GENERAL
HELP NEEDED FOR BATHING: A LITTLE
DAILY ACTIVITIY SCORE: 20
DRESSING REGULAR LOWER BODY CLOTHING: A LOT
MOVING FROM LYING ON BACK TO SITTING ON SIDE OF FLAT BED: A LITTLE
SUGGESTED CMS G CODE MODIFIER MOBILITY: CK
TURNING FROM BACK TO SIDE WHILE IN FLAT BAD: A LITTLE
STANDING UP FROM CHAIR USING ARMS: A LITTLE
MOBILITY SCORE: 18
CLIMB 3 TO 5 STEPS WITH RAILING: A LITTLE
MOVING TO AND FROM BED TO CHAIR: A LITTLE
WALKING IN HOSPITAL ROOM: A LITTLE
SUGGESTED CMS G CODE MODIFIER DAILY ACTIVITY: CJ
TOILETING: A LITTLE

## 2024-03-11 ASSESSMENT — ENCOUNTER SYMPTOMS
COUGH: 0
MYALGIAS: 0
FEVER: 0
CHILLS: 0
SHORTNESS OF BREATH: 0

## 2024-03-11 ASSESSMENT — LIFESTYLE VARIABLES
EVER HAD A DRINK FIRST THING IN THE MORNING TO STEADY YOUR NERVES TO GET RID OF A HANGOVER: NO
ON A TYPICAL DAY WHEN YOU DRINK ALCOHOL HOW MANY DRINKS DO YOU HAVE: 0
HOW MANY TIMES IN THE PAST YEAR HAVE YOU HAD 5 OR MORE DRINKS IN A DAY: 0
TOTAL SCORE: 0
CONSUMPTION TOTAL: NEGATIVE
AVERAGE NUMBER OF DAYS PER WEEK YOU HAVE A DRINK CONTAINING ALCOHOL: 0
EVER FELT BAD OR GUILTY ABOUT YOUR DRINKING: NO
ALCOHOL_USE: NO
TOTAL SCORE: 0
HAVE YOU EVER FELT YOU SHOULD CUT DOWN ON YOUR DRINKING: NO
HAVE PEOPLE ANNOYED YOU BY CRITICIZING YOUR DRINKING: NO
TOTAL SCORE: 0

## 2024-03-11 ASSESSMENT — ACTIVITIES OF DAILY LIVING (ADL): TOILETING: INDEPENDENT

## 2024-03-11 ASSESSMENT — PATIENT HEALTH QUESTIONNAIRE - PHQ9
SUM OF ALL RESPONSES TO PHQ9 QUESTIONS 1 AND 2: 0
1. LITTLE INTEREST OR PLEASURE IN DOING THINGS: NOT AT ALL
2. FEELING DOWN, DEPRESSED, IRRITABLE, OR HOPELESS: NOT AT ALL

## 2024-03-11 ASSESSMENT — PAIN DESCRIPTION - PAIN TYPE
TYPE: ACUTE PAIN
TYPE: ACUTE PAIN

## 2024-03-11 NOTE — PROGRESS NOTES
Bedside report received.  Assessment complete.  A&O x 4. Patient calls appropriately.  Patient ambulates with standby assist. Bed alarm on.   Patient has 0/10 pain. Pain managed with prescribed medications.  Denies N&V. Tolerating NPO diet. IRIS in place with TF at goal.   Surgical Old L chest tube site DIP.  + void, - flatus, + BM.  Patient denies SOB.  SCD's refused.  Review plan with of care with patient. Call light and personal belongings within reach. Hourly rounding in place. All needs met at this time.

## 2024-03-11 NOTE — DISCHARGE PLANNING
1038  DPA received faxed choice form(s). DPA filed choice in Pt Media file. Awaiting MD orders.     1412  Agency/Facility Name: [facility]  Spoke To: [facility]  Outcome: Per [facility] can not accept Pt [facility], and PCP begin [facility].  RN CM notified.    1447  Per RN CM request  Agency/Facility Name: [facility]  Sent Referral at: 1447 pm

## 2024-03-11 NOTE — THERAPY
"Speech Language Pathology   Flexible Endoscopic Evaluation of Swallowing (FEES)        Patient Name: Parvez Peguero  AGE:  74 y.o., SEX:  male  Medical Record #: [id number]  Date of Service: 3/11/2024    History of Present Illness  75 y/o M admit after a syncopal episode on 2/27 w/ GLF and resultant laryngeal fracture. Pt intubated in the field and transferred to [facility]. Initial CTs indicated bilateral pneumothoraces, pneumomediastinum and a thyroid cartilage fracture concerning for a tracheal injury. Pt underwent tracheostomy placement on 3/1 w/ Dr. Contreras. Direct laryngoscopy indicated moderate arytenoid swelling, vocal fold edema. Pt was decannulated on 3/9.     PMH: CAD, dyslipidemia, DM II, Afib, thyroid disorder, HLD, HTN, CHF    CXR 3/11:   \"1.  Interstitial pulmonary parenchymal prominence suggest chronic underlying lung disease, component of interstitial edema and/or infiltrates not excluded.  2.  Cardiomegaly\"    Neck CT 2/27:   \" 1. There is fracture of the thyroid cartilage, along with a suspected injury to the cricoid cartilage. Soft tissue density surrounds the ET tube within the larynx and injury is suspected.  2. Subcutaneous emphysema throughout the neck extending inferiorly. Pneumomediastinum\"      Pertinent Information  Current Method of Nutrition: NPO until cleared by speech pathology  Patient Behaviors: Forgetful   Dentition: Natural dentition, Some missing dentition   Feeding Tube: NGT intact to right nare   Tracheostomy: No (Decannulated on 3/9)         Factor(s) Affecting Performance: None     Discussed the risks, benefits, and alternatives of the FEES procedure. Patient/family acknowledged and agreed to proceed.    Assessment  Flexible Endoscopic Evaluation of Swallowing (FEES) completed at bedside today. The endoscope was passed transnasally via Left nare to evaluate the anatomy and physiology of swallowing. Pt tolerated the procedure with no apparent distress.    Anatomic Findings: " Diffuse pachydermia an erythema of the larynx (moderate)  Vocal Fold Motion: Excrescences on the posterior 1/3 of the vocal fold consistent w/ intubation granuloma. Bilateral vocal fold movement during phonation. A degree of ventricular obliteration was noted w/ recruitment of the false vocal folds during phonation and other adduction tasks (e.g., coughing)    Secretion Management: Excess secretions  PO Trials: Thin Liquid, Mildly Thick Liquid, Liquidised, Pudding, Soft & Bite Sized, Regular Solid, Mixed    Consistency PAS Score Timing Residue Comments   Thin Liquid 4 Pre Swallow Vallecular Residue: None (0%)  Pyriform Sinus Residue: Trace (1%-5%) Max PAS 4, Modal PAS 4    Tsp, cup, straw & sequential sips completed   Mildly Thick 2 Pre Swallow Vallecular Residue: None (0%)  Pyriform Sinus Residue: Mild (5%-25%) Max PAS 2, Modal PAS 1   Liquidised 2 Pre Swallow Vallecular Residue: Trace (1%-5%)  Pyriform Sinus Residue: Mild (5%-25%) Max PAS 2, Modal PAS 1   Pudding 1 N/A Vallecular Residue: None (0%)  Pyriform Sinus Residue: None (0%)    Soft & Bite Sized 1 N/A Vallecular Residue: Trace (1%-5%)  Pyriform Sinus Residue: Trace (1%-5%)    Regular Solid 1 N/A Vallecular Residue: Mild (5%-25%)  Pyriform Sinus Residue: Mild (5%-25%)    Mixed 1 N/A Vallecular Residue: Mild (5%-25%)  Pyriform Sinus Residue: Mild (5%-25%)      Penetration-Aspiration Scale (PAS)  1     No contrast enters airway  2     Contrast enters the airway, remains above the vocal folds, and is ejected from the airway (not seen in the airway at the end of the swallow).  3     Contrast enters the airway, remains above the vocal folds, and is not ejected from the airway (is seen in the airway after the swallow).  4     Contrast enters the airway, contacts the vocal folds, and is ejected from the airway.  5     Contrast enters the airway, contacts the vocal folds, and is not ejected from the airway  6     Contrast enters the airway, crosses the plane of the  vocal folds, and is ejected from the airway.  7     Contrast enters the airway, crosses the plane of the vocal folds, and is not ejected from the airway despite effort.  8     Contrast enters the airway, crosses the plane of the vocal folds, is not ejected from the airway and there is no response to aspiration.    Oral phase: Mildly prolonged mastication for solids, suspect relating to reduced dentition (scattered natural dentition). AP transit appeared timely- no gross oral residue was noted post swallow.     Pharyngeal phase: Swallow initiation appeared mildly reduced and epiglottic inversion for airway closure was also mildly reduced. There were instances of penetration that all occurred before the swallow. Pt was not sensate to penetration events, suspect involvement of the internal branch of the superior laryngeal nerve. Unable to assess involvement of the recurrent laryngeal nerve as no aspiration events were able to be elicited despite provocation (I.e., sequential straw sips). When cued to throat clear, pt was able to clear penetrated material. Pharyngeal constriction and overall bolus transit efficiency appeared grossly intact.     Compensatory Strategies: Throat clear (every 2-3 sips/bites)    Severity Rating:  Severity Rating: ALONA     ALONA: Mild    Clinical Impressions  The pt presents with mild pharyngeal dysphagia, likely acute related to laryngeal trauma w/ intubation in the field, subsequent tracheostomy placement and decannulation in the context of GLF w/ laryngeal fracture. Pt also w/ dysphonia and dystussia d/t same. Swallow safety is mildly impacted; swallow efficiency is largely intact. Pt appears to be at low risk for malnutrition/dehydration and aspiration PNA. Swallow prognosis is excellent pending continued spontaneous rehabilitation and response to rehabilitation. Swallow strategies and minor diet modifications are indicated. Pt is a good candidate for rehabilitation; recommend home health  "post d/c.      Recommendations  Diet Consistency: Thin/all Liquids, Soft & Bites Solids  Medication:  (Whole with applesauce or puree)  Supervision: Distant supervision - check on patient 2-3 times per meal, Assist with meal tray set up  Positioning: Fully upright and midline during oral intake, Meals sitting upright in a chair, as tolerated  Strategies: Slow rate of intake, Small bites/sips (Throat Clear every 2-3 bites/sips)  Oral Care: BID  Additional Instrumentation: None  Consult Referral(s): ENT (Following)    SLP Treatment Plan  Treatment Plan: Dysphagia Treatment  SLP Frequency: 4x Per Week  Estimated Duration: Until Therapy Goals Met    Anticipated Discharge Needs  Discharge Recommendations: Recommend home health for continued speech therapy services   Therapy Recommendations Upon DC: Dysphagia Training     Patient / Family Goals  Patient / Family Goal #1: \"It's easier to talk with this\"  Goal #1 Outcome: Progressing as expected  Short Term Goal # 1: Pt will tolerate speaking valve during all waking hours without negative change to cardiopulmonary vitals  Goal Outcome # 1:  (Discontinue, now decannulated)  Short Term Goal # 2: Pt will complete a diagnostic swallow study to objectively assess swallow function to inform plan of care  Goal Outcome # 2 : Goal met  Short Term Goal # 3: Pt will tolerate a soft & bites solids diet + thin/all liquids without signs of aspiration related pulmonary complications  Short Term Goal # 4: Pt will use rec'd compensatory swallow strategies w/ distant supervision across a 5 minute pt meal    ANJANA Bautista  "

## 2024-03-11 NOTE — PROGRESS NOTES
Trauma / Surgical Daily Progress Note    Date of Service  3/11/2024    Chief Complaint  74 y.o. male admitted 2/27/2024 with laryngeal injury after ground level fall.      Interval Events  No critical event overnight.  Adequate pain control.  FEEs pending.    -Continue to mobilize the patient.  -Pulmonary hygiene.    Disposition: Anticipate discharge home upon medical clearance.  Awaiting FEEs to be completed.     Review of Systems  Review of Systems   Constitutional:  Negative for chills, fever and malaise/fatigue.   Respiratory:  Negative for cough and shortness of breath.    Genitourinary:  Negative for dysuria.   Musculoskeletal:  Negative for myalgias.   All other systems reviewed and are negative.       Vital Signs  Temp:  [36.2 °C (97.2 °F)-37.1 °C (98.8 °F)] 36.5 °C (97.7 °F)  Pulse:  [65-82] 65  Resp:  [18-20] 20  BP: (143-176)/(65-77) 168/77  SpO2:  [95 %-98 %] 98 %    Physical Exam  Physical Exam  Vitals and nursing note reviewed.   Constitutional:       General: He is not in acute distress.     Appearance: He is overweight.      Interventions: Nasal cannula in place.   HENT:      Mouth/Throat:      Mouth: Mucous membranes are moist.      Pharynx: Oropharynx is clear.   Eyes:      Pupils: Pupils are equal, round, and reactive to light.   Neck:      Comments: Tracheostomy site dressing with some drainage present  Cardiovascular:      Rate and Rhythm: Normal rate and regular rhythm.      Pulses: Normal pulses.   Pulmonary:      Effort: No respiratory distress.      Breath sounds: Decreased breath sounds present.      Comments: Tracheostomy site covered with dressing  Abdominal:      General: There is no distension.      Palpations: Abdomen is soft.      Tenderness: There is no abdominal tenderness.   Musculoskeletal:         General: Normal range of motion.      Cervical back: Normal range of motion.   Skin:     General: Skin is warm and dry.      Capillary Refill: Capillary refill takes 2 to 3 seconds.  Obesity   AMBULATORY CARE:   Obesity  is when your body mass index (BMI) is greater than 30  Your healthcare provider will use your height and weight to measure your BMI  The risks of obesity include  many health problems, such as injuries or physical disability  You may need tests to check for the following:  · Diabetes     · High blood pressure or high cholesterol     · Heart disease     · Gallbladder or liver disease     · Cancer of the colon, breast, prostate, liver, or kidney     · Sleep apnea     · Arthritis or gout  Seek care immediately if:   · You have a severe headache, confusion, or difficulty speaking  · You have weakness on one side of your body  · You have chest pain, sweating, or shortness of breath  Contact your healthcare provider if:   · You have symptoms of gallbladder or liver disease, such as pain in your upper abdomen  · You have knee or hip pain and discomfort while walking  · You have symptoms of diabetes, such as intense hunger and thirst, and frequent urination  · You have symptoms of sleep apnea, such as snoring or daytime sleepiness  · You have questions or concerns about your condition or care  Treatment for obesity  focuses on helping you lose weight to improve your health  Even a small decrease in BMI can reduce the risk for many health problems  Your healthcare provider will help you set a weight-loss goal   · Lifestyle changes  are the first step in treating obesity  These include making healthy food choices and getting regular physical activity  Your healthcare provider may suggest a weight-loss program that involves coaching, education, and therapy  · Medicine  may help you lose weight when it is used with a healthy diet and physical activity  · Surgery  can help you lose weight if you are very obese and have other health problems  There are several types of weight-loss surgery  Ask your healthcare provider for more information    Be successful   Neurological:      General: No focal deficit present.      Mental Status: He is oriented to person, place, and time.   Psychiatric:         Mood and Affect: Mood normal.         Behavior: Behavior is cooperative.         Judgment: Judgment normal.         Laboratory  Recent Results (from the past 24 hour(s))   POCT glucose device results    Collection Time: 03/10/24 11:23 PM   Result Value Ref Range    POC Glucose, Blood 192 (H) 65 - 99 mg/dL   POCT glucose device results    Collection Time: 03/11/24  4:46 AM   Result Value Ref Range    POC Glucose, Blood 265 (H) 65 - 99 mg/dL   CBC with Differential: Tomorrow AM    Collection Time: 03/11/24  7:03 AM   Result Value Ref Range    WBC 8.7 4.8 - 10.8 K/uL    RBC 3.63 (L) 4.70 - 6.10 M/uL    Hemoglobin 11.2 (L) 14.0 - 18.0 g/dL    Hematocrit 33.5 (L) 42.0 - 52.0 %    MCV 92.3 81.4 - 97.8 fL    MCH 30.9 27.0 - 33.0 pg    MCHC 33.4 32.3 - 36.5 g/dL    RDW 43.8 35.9 - 50.0 fL    Platelet Count 360 164 - 446 K/uL    MPV 9.3 9.0 - 12.9 fL    Neutrophils-Polys 70.20 44.00 - 72.00 %    Lymphocytes 16.20 (L) 22.00 - 41.00 %    Monocytes 9.90 0.00 - 13.40 %    Eosinophils 2.40 0.00 - 6.90 %    Basophils 0.70 0.00 - 1.80 %    Immature Granulocytes 0.60 0.00 - 0.90 %    Nucleated RBC 0.00 0.00 - 0.20 /100 WBC    Neutrophils (Absolute) 6.14 1.82 - 7.42 K/uL    Lymphs (Absolute) 1.41 1.00 - 4.80 K/uL    Monos (Absolute) 0.86 (H) 0.00 - 0.85 K/uL    Eos (Absolute) 0.21 0.00 - 0.51 K/uL    Baso (Absolute) 0.06 0.00 - 0.12 K/uL    Immature Granulocytes (abs) 0.05 0.00 - 0.11 K/uL    NRBC (Absolute) 0.00 K/uL   Comp Metabolic Panel (CMP): Tomorrow AM    Collection Time: 03/11/24  7:03 AM   Result Value Ref Range    Sodium 135 135 - 145 mmol/L    Potassium 4.2 3.6 - 5.5 mmol/L    Chloride 99 96 - 112 mmol/L    Co2 24 20 - 33 mmol/L    Anion Gap 12.0 7.0 - 16.0    Glucose 271 (H) 65 - 99 mg/dL    Bun 21 8 - 22 mg/dL    Creatinine 0.55 0.50 - 1.40 mg/dL    Calcium 8.8 8.5 - 10.5 mg/dL     losing weight:   · Set small, realistic goals  An example of a small goal is to walk for 20 minutes 5 days a week  Anther goal is to lose 5% of your body weight  · Tell friends, family members, and coworkers about your goals  and ask for their support  Ask a friend to lose weight with you, or join a weight-loss support group  · Identify foods or triggers that may cause you to overeat , and find ways to avoid them  Remove tempting high-calorie foods from your home and workplace  Place a bowl of fresh fruit on your kitchen counter  If stress causes you to eat, then find other ways to cope with stress  · Keep a diary to track what you eat and drink  Also write down how many minutes of physical activity you do each day  Weigh yourself once a week and record it in your diary  Eating changes: You will need to eat 500 to 1,000 fewer calories each day than you currently eat to lose 1 to 2 pounds a week  The following changes will help you cut calories:  · Eat smaller portions  Use small plates, no larger than 9 inches in diameter  Fill your plate half full of fruits and vegetables  Measure your food using measuring cups until you know what a serving size looks like  · Eat 3 meals and 1 or 2 snacks each day  Plan your meals in advance  Mignon Viveros and eat at home most of the time  Eat slowly  · Eat fruits and vegetables at every meal   They are low in calories and high in fiber, which makes you feel full  Do not add butter, margarine, or cream sauce to vegetables  Use herbs to season steamed vegetables  · Eat less fat and fewer fried foods  Eat more baked or grilled chicken and fish  These protein sources are lower in calories and fat than red meat  Limit fast food  Dress your salads with olive oil and vinegar instead of bottled dressing  · Limit the amount of sugar you eat  Do not drink sugary beverages  Limit alcohol  Activity changes:  Physical activity is good for your body in many ways   It Correct Calcium 9.5 8.5 - 10.5 mg/dL    AST(SGOT) 43 12 - 45 U/L    ALT(SGPT) 136 (H) 2 - 50 U/L    Alkaline Phosphatase 138 (H) 30 - 99 U/L    Total Bilirubin 0.4 0.1 - 1.5 mg/dL    Albumin 3.1 (L) 3.2 - 4.9 g/dL    Total Protein 6.5 6.0 - 8.2 g/dL    Globulin 3.4 1.9 - 3.5 g/dL    A-G Ratio 0.9 g/dL   Magnesium: Every Monday and Thursday AM    Collection Time: 03/11/24  7:03 AM   Result Value Ref Range    Magnesium 1.9 1.5 - 2.5 mg/dL   Phosphorus: Every Monday and Thursday AM    Collection Time: 03/11/24  7:03 AM   Result Value Ref Range    Phosphorus 3.1 2.5 - 4.5 mg/dL   ESTIMATED GFR    Collection Time: 03/11/24  7:03 AM   Result Value Ref Range    GFR (CKD-EPI) 103 >60 mL/min/1.73 m 2   POCT glucose device results    Collection Time: 03/11/24 11:44 AM   Result Value Ref Range    POC Glucose, Blood 276 (H) 65 - 99 mg/dL   POCT glucose device results    Collection Time: 03/11/24  6:16 PM   Result Value Ref Range    POC Glucose, Blood 169 (H) 65 - 99 mg/dL       Fluids    Intake/Output Summary (Last 24 hours) at 3/11/2024 1832  Last data filed at 3/11/2024 1552  Gross per 24 hour   Intake 240 ml   Output 975 ml   Net -735 ml       Core Measures & Quality Metrics  Labs reviewed, Radiology images reviewed and Medications reviewed  Brooke catheter: No Brooke      DVT Prophylaxis: Xarelto  DVT prophylaxis - mechanical: SCDs  Ulcer prophylaxis: Not indicated    Assessed for rehab: Patient returned to prior level of function, rehabilitation not indicated at this time    RAP Score Total: 10    CAGE Results: not completed Blood Alcohol>0.08: no       Assessment/Plan  * Trauma- (present on admission)  Assessment & Plan  Walked into gas station stated he didn't feel well then had syncopal episode.  Trauma Red Transfer Activation from [facility] in [place], [place].  Taz Ward MD. Trauma Surgery.    Elevated troponin- (present on admission)  Assessment & Plan  Admission troponin level 354.  EKG with SR  helps you burn calories and build strong muscles  It decreases stress and depression, and improves your mood  It can also help you sleep better  Talk to your healthcare provider before you begin an exercise program   · Exercise for at least 30 minutes 5 days a week  Start slowly  Set aside time each day for physical activity that you enjoy and that is convenient for you  It is best to do both weight training and an activity that increases your heart rate, such as walking, bicycling, or swimming  · Find ways to be more active  Do yard work and housecleaning  Walk up the stairs instead of using elevators  Spend your leisure time going to events that require walking, such as outdoor festivals or fairs  This extra physical activity can help you lose weight and keep it off  Follow up with your healthcare provider as directed: You may need to meet with a dietitian  Write down your questions so you remember to ask them during your visits  © 2017 [employer] Information is for End User's use only and may not be sold, redistributed or otherwise used for commercial purposes  All illustrations and images included in CareNotes® are the copyrighted property of A D A M , Inc  or Gilles Yost  The above information is an  only  It is not intended as medical advice for individual conditions or treatments  Talk to your doctor, nurse or pharmacist before following any medical regimen to see if it is safe and effective for you  without acute changes.  3/3 Cardiology recommendation:  Heparin gtt, ASA 81, and Xarelto when feasible.  Secretions from trach are no longer bloody.  Heparin gtt and ASA started  3/8 Continues on Xarelto  Otis Ni MD, Cardiology    Fracture closed, thyroid cartilage, initial encounter (HCC)- (present on admission)  Assessment & Plan  CT at [place] showing mildly displaced fracture of the left para midline aspect of the thyroid cartilage.  Bronchoscopy in ICU without evidence of tracheobronchial injury distal to the endotracheal tube however bloody sections noted proximally.  CT neck with thyroid cartilage fracture & suspect injury to cricoid cartilage.  3/2 Tracheostomy placement. Direct laryngoscopy and tracheoscopy.  3/9 Trach removed by Dr. Contreras.  Flaco Contreras MD. [facility].    Pneumomediastinum ([facility])- (present on admission)  Assessment & Plan  Extensive gas is noted within the superficial and deep soft tissue planes of the upper chest and neck.  Opacification of a 4 cm segment of the trachea, distal to the cords, surrounding the endotracheal tube is present, and could represent a manifestation of tracheal injury.  CT neck with subcutaneous emphysema & pneumomediastinum.  ENT and GI consulted for concern for tracheal/esophageal injuries.  Flaco Contreras MD. [facility].  Don Armstrong MD. [facility].    Respiratory failure following trauma (HCC)- (present on admission)  Assessment & Plan  Intubated at [place].  3/1 Endoscopic evaluation of airway and trach placement today with ENT  3/2  Switch to trach collar, tolerating  3/3 Tolerating t piece.  3/6 Downsized to 6 noncuffed by ENT.  Keflex initiated for purulent drainage, 7 days.  3/9 Trach removed by Dr. Contreras. FEEs pending.    Tracheostomy to be managed by ENT.  Not to be decannulated this admission.  Can follow up with ENT as an outpatient for decannulation when appropriate    Fever  Assessment  & Plan  Temperature 100-101 F, responding to tylenol.  Signficant secretion burden, likely from this, no clearly purulent, normal wbc count.  Continue to monitor and treat with tylenol for now.    Syncope and collapse- (present on admission)  Assessment & Plan  EKG sinus rhythm & left anterior fascicular block.  Trend troponin.   Echocardiogram with LVEF 50%.  Continuous cardiac monitoring  3/1 Some ectopy overnight self limited and seems to be asymptomatic, reviewed electrolytes WNL    Pneumothorax on left- (present on admission)  Assessment & Plan  Left chest tube placed by Hernan.  2/29 Water seal chest tube.  3/1 Will keep chest tube until definitive management of airway  3/2 CXR in AM, if trace pneumothorax is stable, remove chest tube  3/3 CXR without pneumo, chest tube removal.  Serial CXRs    History of chronic CHF- (present on admission)  Assessment & Plan  Per chart review.   History of dilated cardiomyopathy  2/28 Echocardiogram with LVEF 50%.    Diabetes (HCC)- (present on admission)  Assessment & Plan  Chronic condition treated with Insulin and metformin.  Holding maintenance metformin for 48 hours following intravenous contrast administration.  Insulin sliding scale coverage.  3/1 15 u lantus resumed.   3/2 lantus increase to 25 u  3/3 Start insulin gtt  3/5 Insulin gtt stopped.  3/6 Basilar insulin increased to 35 units qAM.    A-fib (HCC)- (present on admission)  Assessment & Plan  Chronic condition treated with Xarelto and Toprol Xl  EKG sinus rhythm.  Toprol XL resumed on admit.  Systemic anticoagulation held on admission.  3/8 Continued Xarelto    GERD (gastroesophageal reflux disease)- (present on admission)  Assessment & Plan  Chronic condition treated with omeprazole.  Holding maintenance medication during acute traumatic illness.  Lansoprasole via enteral tube.    Urinary retention- (present on admission)  Assessment & Plan  Chronic condition treated with tamsulosin.  Holding maintenance  medication during acute traumatic illness. Consider initiation when tolerating PO.    Primary hypertension- (present on admission)  Assessment & Plan  Chronic condition treated with lisinopril.  3/6 Resumed maintenance medication.    Hypothyroid- (present on admission)  Assessment & Plan  Chronic condition treated with levothyroxine.  3/6 Resumed maintenance medication.    No contraindication to deep vein thrombosis (DVT) prophylaxis- (present on admission)  Assessment & Plan  2/29 Prophylactic dose enoxaparin 40 mg BID initiated.     High cholesterol- (present on admission)  Assessment & Plan  Chronic condition treated with Crestor.  Resumed maintenance medication on admission.        Discussed patient condition with RN, Patient, and trauma surgery, Dr. Ward.

## 2024-03-11 NOTE — DISCHARGE PLANNING
Case Management Discharge Planning    Admission Date: 2/27/2024  GMLOS: 26.1  ALOS: 13    6-Clicks ADL Score: 24  6-Clicks Mobility Score: 18      Anticipated Discharge Dispo: Discharge Disposition: D/T to home under HHA care in anticipation of covered skilled care (06)  Discharge Contact Phone Number: [phone]    DME Needed: No    Action(s) Taken: Chart review completed. Patient discussed during IDT rounds.     Per IDT, patient will need HH services upon discharge. RNCM met with patient at bedside and obtained choice for Critical access hospital. Choice form faxed to [facility] and referral sent per patient's choice.    Critical access hospital has declined patient; not a recognized provider. RNCM placed TC to Aparna Marcus [phone] to help facilitate HH arrangement.     Aparna will reach out to Gloria [facility]) and update RNCM with an answer this afternoon or tomorrow am.     Aparna states she was able to obtain a PCP appt for the patient; the patient has an appt with his PCP on March 13th at 9:30am.     1430: RNCM received call from [facility] at [facility] re: HH referral. Per [place], patient has 2 options:    Find an outpatient speech therapy appt for patient in [facility], or [place]  Establish patient with a PCP from [facility]     RNCM reached out to [facility] scheduling to assist with obtaining PCP appt through [facility]; awaiting return call.     [facility] scheduling returned RNCM call; patient has an appt with Rico Martinez ([facility] provider) on 3/15 at 10:15am.     1452: RNCM requested DPA to re-send home health referral to [facility] with updated provider. RNCM left  for admissions with Critical access hospital; awaiting return call.      1500: TC received from Critical access hospital; patient's insurance is not a contracted provider. Per Critical access hospital.    RNCM placed TC to [facility] re: able to provide OP speech therapy. Per [facility], they are able to accommodate patient's OP speech therapy needs.     RNCM updated provider on status via [employer].      1515: RNCM faxed speech therapy referral and latest speech progress notes to [place] admissions department per request [phone]    Escalations Completed: None    Medically Clear: No    Next Steps: CM to continue to follow up with IDT regarding discharge planning needs/barriers.     Barriers to Discharge: Medical clearance    Is the patient up for discharge tomorrow: No

## 2024-03-11 NOTE — THERAPY
Physical Therapy   Initial Evaluation     Patient Name: Parvez Peguero  Age:  74 y.o., Sex:  male  Medical Record #: [id number]  Today's Date: 3/11/2024     Precautions: Fall Risk; Swallow Precautions  Comments: s/p trach and subsequent deccanulation on 3/9    Assessment  Patient is 74 y.o. male presenting acute with laryngeal injury s/p GLF requiring tracheostomy. Pt de-cannulated 3/9. He presents with generalized weakness and impaired balance. Today he was able to negotiate 75ft of amb with FWW and CGA for stability. He demonstrated 1 moderate LOB requiring therapist to assist with balance recovery. Encouraged to keep walker closer for optimal support as he tends to lose balance toward L side. Instructed on step negotiation for access to home. Anticipate he DCs with HH and FWW. PT to follow as he remains in acute setting.    Plan    Physical Therapy Initial Treatment Plan   Treatment Plan : Bed Mobility, Equipment, Neuro Re-Education / Balance, Self Care / Home Evaluation, Stair Training, Therapeutic Activities, Therapeutic Exercise  Treatment Frequency: 3 Times per Week  Duration: Until Therapy Goals Met    DC Equipment Recommendations: Front-Wheel Walker  Discharge Recommendations: Recommend home health for continued physical therapy services     Objective      Prior Living Situation   Prior Services Home-Independent   Housing / Facility 1 Story House   Steps Into Home 2   Rail   (pt unsure if 1-2 HR)   Equipment Owned None   Lives with - Patient's Self Care Capacity Spouse   Comments Lives in 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   History of Falls   History of Falls Yes   Date of Last Fall   (reason for admit)   Cognition    Cognition /  Consciousness X   Level of Consciousness Alert   Safety Awareness Impaired   Comments air leak s/p deccanulation, cues to slow activity for safety   Active ROM Lower Body    Active ROM Lower Body  WDL   Strength Lower Body   Lower Body Strength  X   Gross Strength Generalized Weakness, Equal Bilaterally   Comments BLE strength 3+/5   Neurological Concerns   Neurological Concerns No   Balance Assessment   Sitting Balance (Static) Fair   Sitting Balance (Dynamic) Fair   Standing Balance (Static) Fair   Standing Balance (Dynamic) Fair -   Weight Shift Sitting Fair   Weight Shift Standing Fair   Comments w/ FWW, moderate LOB after stairs requiring assist from therapist to recovery - cues on FWW placement   Bed Mobility    Supine to Sit   (up in chair prior)   Sit to Supine Standby Assist   Gait Analysis   Gait Level Of Assist Contact Guard Assist   Assistive Device Front Wheel Walker   Distance (Feet) 75   # of Times Distance was Traveled 1   Deviation Bradykinetic;Shuffled Gait   # of Stairs Climbed 4   Level of Assist with Stairs Contact Guard Assist   Weight Bearing Status no restrictions   Comments instructed in U/B HR support as needed, LOB tends to be toward the L   Functional Mobility   Sit to Stand Standby Assist   Bed, Chair, Wheelchair Transfer Contact Guard Assist   Transfer Method Stand Step   6 Clicks Assessment - How much HELP from from another person do you currently need... (If the patient hasn't done an activity recently, how much help from another person do you think he/she would need if he/she tried?)   Turning from your back to your side while in a flat bed without using bedrails? 3   Moving from lying on your back to sitting on the side of a flat bed without using bedrails? 3   Moving to and from a bed to a chair (including a wheelchair)? 3   Standing up from a chair using your arms (e.g., wheelchair, or bedside chair)? 3   Walking in hospital room? 3   Climbing 3-5 steps with a railing? 3   6  clicks Mobility Score 18   Activity Tolerance   Sitting in Chair pre session   Sitting Edge of Bed 2 mins   Standing 10 mins   Short Term Goals    Short Term Goal # 1 Pt will demo supine<>sit with HOB flat and SPV within 6 visits.   Short Term Goal # 2 Pt will amb 300ft with FWW and SPV within 6 visits.   Education Group   Education Provided Role of Physical Therapist;Stair Training;Use of Assistive Device   Role of Physical Therapist Patient Response Patient;Acceptance;Explanation;Demonstration;Action Demonstration   Stair Training Patient Response Patient;Acceptance;Demonstration;Explanation;Action Demonstration   Use of Assistive Device Patient Response Patient;Acceptance;Explanation;Demonstration;Action Demonstration

## 2024-03-11 NOTE — CARE PLAN
The patient is Stable - Low risk of patient condition declining or worsening    Shift Goals  Clinical Goals: monitor respiratory status, monitor iris, OOB activity, safety  Patient Goals: discharge  Family Goals: updates    Progress made toward(s) clinical / shift goals:      Patient will remain free from falls with appropriate use of call light, frequent rounding, OOB activity, and PT/OT intervention.    Problem: Fall Risk  Goal: Patient will remain free from falls  Outcome: Progressing

## 2024-03-11 NOTE — DIETARY
"Nutrition support brief update:  Day 13 of admit.  Parvez Peguero is a 74 y.o. male with admitting DX of Trauma and stressor-related disorder.    Tube feeding initiated . Current TF via IRIS: Vital HP @ goal rate of 65 ml/hr, providing 1560 kcals, 136 grams protein, 1304 mL free water per day.    Pt transferred from Trauma ICU to [facility] the evening of 3/8.  Pt was previously assessed for tube feeding using SCCM guidelines (intubated, obesity). Hypo-caloric tube feeding indicated at that time.  Tube feeding to be adjusted to better meet pt's estimated nutrition needs.    Estimated Nutritional Needs based on: Height: 185.4 cm (6' 0.99\"), Weight: 113 kg (250 lb) via bed scale on  (admit scale wt)  Weight to Use in Calculations: 113 kg (250 lb)  Ideal Body Weight: 83.5 kg (184 lb)  Percent Ideal Body Weight: 135.9  Body mass index is 32.99 kg/m²., BMI classification: Obese Class I     Calculation/Equation: REE per MSJ x 1.0 = 1925 kcals/day  Total Calories / day: 1921 - 2147 (Calories / k-19)  Total Grams Protein / day: 136+ (Grams Protein / k.2+)     Recommendations/Plan:  Change TF to Pivot 1.5. Final goal rate will be 60 mL/hr, providing 2160 kcals, 135 grams of protein and 1080 mL of free water per day.  Fluids per MD.  PO diet per SLP/MD when safe/appropriate and pt can adequately consume.    RD following.             "

## 2024-03-11 NOTE — THERAPY
"Occupational Therapy   Initial Evaluation     Patient Name: Parvez Peguero  Age:  74 y.o., Sex:  male  Medical Record #: [id number]  Today's Date: 3/11/2024     Precautions  Precautions: (P) Fall Risk, Swallow Precautions    Assessment  Patient is 74 y.o. male admitted 2/27/2024 with laryngeal injury after ground level fall with syncope and trauma (tracheal/cricoid cartilage fracture with pneumothoraces, unclear if related to intubation outside facility. PMH: PCI mLAD 6/7/2021 ([facility]) ischemic cardiomyopathy recovered as of 2022, type 2 diabetes, dyslipidemia, PAF on Xarelto, thyroid disorder, provoked DVT. Pt decannulated 3/9.     Pt seen for OT eval. Pt participated in functional mobility, requiring use of FWW and CGA. Pt fatigues with exertion and with communication has dsyphonia, requiring rest breaks. Pt required overall Sarah for ADLs. Pt will be safe to dc home with HHOT, anticipate 1-2 additional therapy sessions.     Plan    Occupational Therapy Initial Treatment Plan   Treatment Interventions: (P) Self Care / Activities of Daily Living, Therapeutic Activity, Therapeutic Exercises, Adaptive Equipment  Treatment Frequency: (P) 3 Times per Week  Duration: (P) Until Therapy Goals Met    DC Equipment Recommendations: (P) Tub / Shower Seat  Discharge Recommendations: (P) Recommend home health for continued occupational therapy services     Subjective    \"Im feeling much better\"      Objective       03/11/24 1442   Prior Living Situation   Prior Services Home-Independent   Housing / Facility 1 Story House   Steps Into Home 2   Bathroom Set up Bathtub / Shower Combination   Equipment Owned Tub / Shower Seat   Lives with - Patient's Self Care Capacity Spouse   Prior Level of ADL Function   Self Feeding Independent   Grooming / Hygiene Independent   Bathing Independent   Dressing Independent   Toileting Independent   Prior Level of IADL Function   Medication Management Independent   Laundry " Independent   Kitchen Mobility Independent   Finances Independent   Home Management Independent   Shopping Independent   Prior Level Of Mobility Independent Without Device in Community   Driving / Transportation Driving Independent   Occupation (Pre-Hospital Vocational) Employed Part Time  (snow removal worker)   History of Falls   History of Falls Yes   Date of Last Fall   (1 fall leading to admission)   Precautions   Precautions Fall Risk;Swallow Precautions   Vitals   O2 (LPM) 1   O2 Delivery Device Silicone Nasal Cannula   Pain   Intervention Declines   Pain 0 - 10 Group   Therapist Pain Assessment Post Activity;Post Activity Pain Same as Prior to Activity;Nurse Notified;0  (no pain reported, just discomfort)   Cognition    Cognition / Consciousness WDL   Level of Consciousness Alert   Comments pt pleasant and coopertive throughout, able to make needs known, follows all commands   Passive ROM Upper Body   Passive ROM Upper Body WDL   Active ROM Upper Body   Active ROM Upper Body  WDL   Dominant Hand Right   Strength Upper Body   Upper Body Strength  WDL   Sensation Upper Body   Upper Extremity Sensation  WDL   Upper Body Muscle Tone   Upper Body Muscle Tone  WDL   Neurological Concerns   Neurological Concerns No   Coordination Upper Body   Coordination WDL   Balance Assessment   Sitting Balance (Static) Fair +   Sitting Balance (Dynamic) Fair   Standing Balance (Static) Fair +   Standing Balance (Dynamic) Fair   Weight Shift Sitting Fair   Weight Shift Standing Fair   Comments w/FWW   Bed Mobility    Comments was up in chair   ADL Assessment   Eating Independent   Grooming Supervision;Standing   Upper Body Dressing Minimal Assist   Lower Body Dressing Moderate Assist   Toileting   (declined)   Functional Mobility   Sit to Stand Contact Guard Assist   Bed, Chair, Wheelchair Transfer Standby Assist   Toilet Transfers Contact Guard Assist   Transfer Method Stand Step   Mobility chair> BR> in rm> chair   Comments  w/FWW   Visual Perception   Visual Perception  WDL   Edema / Skin Assessment   Edema / Skin  X   Comments prior trach incision   Activity Tolerance   Sitting in Chair pre and post session   Standing 10 min   Patient / Family Goals   Patient / Family Goal #1 to go home   Short Term Goals   Short Term Goal # 1 Pt will perform functional transfers supervised   Short Term Goal # 2 Pt will perform UB/LB dressing supervised   Short Term Goal # 3 Pt will perform toileting supervised   Education Group   Education Provided Activities of Daily Living;Role of Occupational Therapist;Transfers   Role of Occupational Therapist Patient Response Patient;Acceptance;Explanation;Demonstration;Verbal Demonstration;Action Demonstration   Transfers Patient Response Patient;Acceptance;Demonstration;Verbal Demonstration;Action Demonstration   ADL Patient Response Patient;Acceptance;Explanation;Demonstration;Action Demonstration;Verbal Demonstration   Occupational Therapy Initial Treatment Plan    Treatment Interventions Self Care / Activities of Daily Living;Therapeutic Activity;Therapeutic Exercises;Adaptive Equipment   Treatment Frequency 3 Times per Week   Duration Until Therapy Goals Met   Problem List   Problem List Decreased Active Daily Living Skills;Decreased Functional Mobility;Decreased Activity Tolerance   Anticipated Discharge Equipment and Recommendations   DC Equipment Recommendations Tub / Shower Seat   Discharge Recommendations Recommend home health for continued occupational therapy services   Interdisciplinary Plan of Care Collaboration   IDT Collaboration with  Nursing   Patient Position at End of Therapy Seated;Call Light within Reach;Tray Table within Reach   Collaboration Comments RN updated   Session Information   Date / Session Number  3/11, #1 (1/3, 3/17)

## 2024-03-11 NOTE — CARE PLAN
Problem: Respiratory  Goal: Patient will achieve/maintain optimum respiratory ventilation and gas exchange  Description: Target End Date:  Prior to discharge or change in level of care    Document on Assessment flowsheet    1.  Assess and monitor rate, rhythm, depth and effort of respiration  2.  Breath sounds assessed qshift and/or as needed  3.  Assess O2 saturation, administer/titrate oxygen as ordered  4.  Position patient for maximum ventilatory efficiency  5.  Turn, cough, and deep breath with splinting to improve effectiveness  6.  Collaborate with RT to administer medication/treatments per order  7.  Encourage use of incentive spirometer and encourage patient to cough after use and utilize splinting techniques if applicable  8.  Airway suctioning  9.  Monitor sputum production for changes in color, consistency and frequency  10. Perform frequent oral hygiene  11. Alternate physical activity with rest periods  Outcome: Progressing     Problem: Fall Risk  Goal: Patient will remain free from falls  Description: Target End Date:  Prior to discharge or change in level of care    Document interventions on the Fournierjermaine Mendez Fall Risk Assessment    1.  Assess for fall risk factors  2.  Implement fall precautions  Outcome: Progressing   The patient is Watcher - Medium risk of patient condition declining or worsening    Shift Goals  Clinical Goals: Swallow eval  Patient Goals: go home  Family Goals: updates    Progress made toward(s) clinical / shift goals:  Swallow eval likely Monday 3/11. Continuous TF in place at goal rate 65 mL/hr, pt tolerating    Patient is not progressing towards the following goals:

## 2024-03-11 NOTE — FACE TO FACE
Face to Face Supporting Documentation - Home Health    The encounter with this patient was in whole or in part the primary reason for home health admission.    Date of encounter:   Patient:                    MRN:                       YOB: 2025  Parvez Peguero  [id number]  1950     Home health to see patient for:  Skilled Nursing care for assessment, interventions & education    Skilled need for:  New Onset Medical Diagnosis trauma-pneumomediastinum, thyroid cartilage fracture requiring tracheostomy, dysphagia    Skilled nursing interventions to include:  Comment: Speech Therapy for continue monitoring of swallowing    Homebound status evidenced by:  Needs the assistance of another person in order to leave the home. Leaving home requires a considerable and taxing effort. There is a normal inability to leave the home.    Community Physician to provide follow up care: Bobby Mcdowell M.D.     Optional Interventions? No      I certify the face to face encounter for this home health care referral meets the CMS requirements and the encounter/clinical assessment with the patient was, in whole, or in part, for the medical condition(s) listed above, which is the primary reason for home health care. Based on my clinical findings: the service(s) are medically necessary, support the need for home health care, and the homebound criteria are met.  I certify that this patient has had a face to face encounter by myself.  HAYLIE MottP. - NPI: [id number]

## 2024-03-12 ENCOUNTER — APPOINTMENT (OUTPATIENT)
Dept: RADIOLOGY | Facility: MEDICAL CENTER | Age: 74
DRG: 004 | End: 2024-03-12
Attending: SURGERY
Payer: COMMERCIAL

## 2024-03-12 VITALS
OXYGEN SATURATION: 96 % | SYSTOLIC BLOOD PRESSURE: 139 MMHG | HEIGHT: 73 IN | TEMPERATURE: 98.2 F | DIASTOLIC BLOOD PRESSURE: 76 MMHG | HEART RATE: 63 BPM | WEIGHT: 290.57 LBS | BODY MASS INDEX: 38.51 KG/M2 | RESPIRATION RATE: 18 BRPM

## 2024-03-12 LAB
ALBUMIN SERPL BCP-MCNC: 3.3 G/DL (ref 3.2–4.9)
ALBUMIN/GLOB SERPL: 1 G/DL
ALP SERPL-CCNC: 135 U/L (ref 30–99)
ALT SERPL-CCNC: 111 U/L (ref 2–50)
ANION GAP SERPL CALC-SCNC: 11 MMOL/L (ref 7–16)
AST SERPL-CCNC: 36 U/L (ref 12–45)
BACTERIA WND AEROBE CULT: ABNORMAL
BASOPHILS # BLD AUTO: 0.6 % (ref 0–1.8)
BASOPHILS # BLD: 0.05 K/UL (ref 0–0.12)
BILIRUB SERPL-MCNC: 0.3 MG/DL (ref 0.1–1.5)
BUN SERPL-MCNC: 19 MG/DL (ref 8–22)
CALCIUM ALBUM COR SERPL-MCNC: 9.5 MG/DL (ref 8.5–10.5)
CALCIUM SERPL-MCNC: 8.9 MG/DL (ref 8.5–10.5)
CHLORIDE SERPL-SCNC: 101 MMOL/L (ref 96–112)
CO2 SERPL-SCNC: 23 MMOL/L (ref 20–33)
CREAT SERPL-MCNC: 0.63 MG/DL (ref 0.5–1.4)
EOSINOPHIL # BLD AUTO: 0.23 K/UL (ref 0–0.51)
EOSINOPHIL NFR BLD: 2.6 % (ref 0–6.9)
ERYTHROCYTE [DISTWIDTH] IN BLOOD BY AUTOMATED COUNT: 43.8 FL (ref 35.9–50)
GFR SERPLBLD CREATININE-BSD FMLA CKD-EPI: 99 ML/MIN/1.73 M 2
GLOBULIN SER CALC-MCNC: 3.2 G/DL (ref 1.9–3.5)
GLUCOSE SERPL-MCNC: 196 MG/DL (ref 65–99)
GRAM STN SPEC: ABNORMAL
HCT VFR BLD AUTO: 34.4 % (ref 42–52)
HGB BLD-MCNC: 11.6 G/DL (ref 14–18)
IMM GRANULOCYTES # BLD AUTO: 0.06 K/UL (ref 0–0.11)
IMM GRANULOCYTES NFR BLD AUTO: 0.7 % (ref 0–0.9)
LYMPHOCYTES # BLD AUTO: 1.64 K/UL (ref 1–4.8)
LYMPHOCYTES NFR BLD: 18.7 % (ref 22–41)
MCH RBC QN AUTO: 31.4 PG (ref 27–33)
MCHC RBC AUTO-ENTMCNC: 33.7 G/DL (ref 32.3–36.5)
MCV RBC AUTO: 93 FL (ref 81.4–97.8)
MONOCYTES # BLD AUTO: 0.93 K/UL (ref 0–0.85)
MONOCYTES NFR BLD AUTO: 10.6 % (ref 0–13.4)
NEUTROPHILS # BLD AUTO: 5.87 K/UL (ref 1.82–7.42)
NEUTROPHILS NFR BLD: 66.8 % (ref 44–72)
NRBC # BLD AUTO: 0 K/UL
NRBC BLD-RTO: 0 /100 WBC (ref 0–0.2)
PLATELET # BLD AUTO: 401 K/UL (ref 164–446)
PMV BLD AUTO: 9.3 FL (ref 9–12.9)
POTASSIUM SERPL-SCNC: 4.1 MMOL/L (ref 3.6–5.5)
PROT SERPL-MCNC: 6.5 G/DL (ref 6–8.2)
RBC # BLD AUTO: 3.7 M/UL (ref 4.7–6.1)
SIGNIFICANT IND 70042: ABNORMAL
SITE SITE: ABNORMAL
SODIUM SERPL-SCNC: 135 MMOL/L (ref 135–145)
SOURCE SOURCE: ABNORMAL
WBC # BLD AUTO: 8.8 K/UL (ref 4.8–10.8)

## 2024-03-12 PROCEDURE — 85025 COMPLETE CBC W/AUTO DIFF WBC: CPT

## 2024-03-12 PROCEDURE — 71045 X-RAY EXAM CHEST 1 VIEW: CPT

## 2024-03-12 PROCEDURE — 700102 HCHG RX REV CODE 250 W/ 637 OVERRIDE(OP)

## 2024-03-12 PROCEDURE — A9270 NON-COVERED ITEM OR SERVICE: HCPCS

## 2024-03-12 PROCEDURE — A9270 NON-COVERED ITEM OR SERVICE: HCPCS | Performed by: SURGERY

## 2024-03-12 PROCEDURE — 700102 HCHG RX REV CODE 250 W/ 637 OVERRIDE(OP): Performed by: SURGERY

## 2024-03-12 PROCEDURE — 99239 HOSP IP/OBS DSCHRG MGMT >30: CPT

## 2024-03-12 PROCEDURE — 80053 COMPREHEN METABOLIC PANEL: CPT

## 2024-03-12 RX ORDER — GABAPENTIN 300 MG/1
300 CAPSULE ORAL 2 TIMES DAILY
Qty: 28 CAPSULE | Refills: 0 | Status: SHIPPED | OUTPATIENT
Start: 2024-03-12 | End: 2024-03-15

## 2024-03-12 RX ORDER — ROSUVASTATIN CALCIUM 20 MG/1
20 TABLET, COATED ORAL DAILY
Qty: 30 TABLET | Refills: 11 | Status: SHIPPED | OUTPATIENT
Start: 2024-03-13

## 2024-03-12 RX ORDER — LEVOTHYROXINE SODIUM 0.07 MG/1
75 TABLET ORAL
Qty: 30 TABLET | Refills: 0 | Status: SHIPPED | OUTPATIENT
Start: 2024-03-13 | End: 2024-04-12

## 2024-03-12 RX ORDER — AMOXICILLIN 250 MG
1 CAPSULE ORAL DAILY
COMMUNITY
Start: 2024-03-12

## 2024-03-12 RX ORDER — CEPHALEXIN 250 MG/5ML
500 POWDER, FOR SUSPENSION ORAL 4 TIMES DAILY
Qty: 240 ML | Refills: 0 | Status: ACTIVE | OUTPATIENT
Start: 2024-03-12 | End: 2024-03-18

## 2024-03-12 RX ORDER — CEPHALEXIN 250 MG/5ML
500 POWDER, FOR SUSPENSION ORAL EVERY 8 HOURS
Qty: 180 ML | Refills: 0 | Status: ACTIVE | OUTPATIENT
Start: 2024-03-12 | End: 2024-03-12

## 2024-03-12 RX ORDER — ASPIRIN 81 MG/1
81 TABLET, CHEWABLE ORAL DAILY
COMMUNITY
Start: 2024-03-13 | End: 2024-03-15

## 2024-03-12 RX ORDER — ACETAMINOPHEN 500 MG
500-1000 TABLET ORAL EVERY 6 HOURS PRN
COMMUNITY
Start: 2024-03-12

## 2024-03-12 RX ORDER — CEPHALEXIN 250 MG/5ML
500 POWDER, FOR SUSPENSION ORAL EVERY 8 HOURS
Qty: 200 ML | Refills: 0 | Status: ACTIVE | OUTPATIENT
Start: 2024-03-12 | End: 2024-03-12

## 2024-03-12 RX ADMIN — CEPHALEXIN 500 MG: 250 FOR SUSPENSION ORAL at 05:22

## 2024-03-12 RX ADMIN — METOPROLOL TARTRATE 25 MG: 25 TABLET, FILM COATED ORAL at 05:16

## 2024-03-12 RX ADMIN — METFORMIN HYDROCHLORIDE 1000 MG: 500 TABLET ORAL at 08:11

## 2024-03-12 RX ADMIN — ASPIRIN 81 MG 81 MG: 81 TABLET ORAL at 05:17

## 2024-03-12 RX ADMIN — LEVOTHYROXINE SODIUM 75 MCG: 0.05 TABLET ORAL at 05:17

## 2024-03-12 RX ADMIN — CELECOXIB 200 MG: 200 CAPSULE ORAL at 05:16

## 2024-03-12 RX ADMIN — ROSUVASTATIN CALCIUM 20 MG: 20 TABLET, FILM COATED ORAL at 05:22

## 2024-03-12 RX ADMIN — LISINOPRIL 5 MG: 5 TABLET ORAL at 05:17

## 2024-03-12 RX ADMIN — GABAPENTIN 300 MG: 300 CAPSULE ORAL at 05:16

## 2024-03-12 ASSESSMENT — PAIN DESCRIPTION - PAIN TYPE
TYPE: ACUTE PAIN

## 2024-03-12 NOTE — CARE PLAN
The patient is Stable - Low risk of patient condition declining or worsening    Shift Goals  Clinical Goals: monitor respiratory status  Patient Goals: go home  Family Goals: updates    Progress made toward(s) clinical / shift goals:    Problem: Knowledge Deficit - Standard  Goal: Patient and family/care givers will demonstrate understanding of plan of care, disease process/condition, diagnostic tests and medications  Outcome: Progressing

## 2024-03-12 NOTE — DISCHARGE INSTRUCTIONS
- Call or seek medical attention for questions or concerns  - Follow up with the [facility] RETURN: if needed.  - Follow up with Dr. Contreras, ENT, in 1 week for recheck and for any issues regarding tracheostomy.  - Go to your appt with Rico Martinez ([place] provider) on 3/15 at 10:15am.  To establish yourself so they can assist you with setting up home health for physical, occupational and speech therapy.  - Resume regular, soft and bite size diet. Stop with any choking or vomiting.  - Follow up with outpatient Speech Therapy for continued support.  - Okay to change dressing to old tracheostomy site once a day.  - May take over the counter acetaminophen as needed for pain  - Continue daily over the counter stool softener while on narcotics  - No operation of machinery or motorized vehicles while under the influence of narcotics  - No alcohol, marijuana or illicit drug use while under the influence of narcotics  - In the event of a narcotic overdose naloxone (Narcan) is available without a prescription from any [facility] or [facility]  - No swimming, hot tubs, baths or wound submersion until cleared by outpatient provider. May shower  - No contact sports, strenuous activities, or heavy lifting until cleared by outpatient provider  - If respiratory decompensation, persistent or worsening pain, or signs or symptoms of infection occur seek medical attention

## 2024-03-12 NOTE — PROGRESS NOTES
Bedside report received.  Assessment complete.  A&O x 4. Patient calls appropriately.  Patient ambulates with standby assist. Bed alarm off.   Patient has 0/10 pain. Pain managed with prescribed medications.  Denies N&V. Tolerating soft and bite sized diet.   Surgical Old L chest tube site DIP.  + void, + flatus, - BM.  Patient denies SOB.  SCD's refused.  Review plan with of care with patient. Call light and personal belongings within reach. Hourly rounding in place. All needs met at this time.

## 2024-03-12 NOTE — PROGRESS NOTES
Trauma / Surgical Daily Progress Note    Date of Service  3/12/2024    Chief Complaint  74 y.o. male admitted 2/27/2024 with laryngeal injury after ground level fall.         FEEs completed. Diet advanced. TFs and Iris removed.  Wound culture preliminary positive - Keppra appropriate antibiotic.  Labs stable.  50-75% diet documented. Denies choking or vomiting.  Tolerating room air.  DME ordered - walker.  Adequate pain control.  Last BM 3/10.    A/Ox4.   Respiratory rate even and unlabored.  Tracheostomy site covered with dressing - dressing to be changed by staff  Old chest tube site with dressing      Cleared for discharge home. Patient does not qualify for home health due to insurance benefits. Outpatient referral placed for physical, occupational and speech therapy. Patient has an appt with a PCP on 3/15/24. Patient should follow up with Dr. Contreras, ENT. Discussed prescriptions with patient, as well as follow up. All questions answered.

## 2024-03-12 NOTE — PROGRESS NOTES
Discharge instructions reviewed and signed, all questions answered, PIV removed, [facility].

## 2024-03-12 NOTE — DISCHARGE SUMMARY
Trauma Discharge Summary    DATE OF ADMISSION: 2/27/2024    DATE OF DISCHARGE: 3/12/2024    LENGTH OF STAY: 14 days    ATTENDING PHYSICIAN: Taz Ward M.D.    CONSULTING PHYSICIAN:   1. Geovany Ni MD, Cardiology  2. Flaco Contreras MD, Surgery Otolaryngology   3. Don Armstrong MD Gastroenterology    DISCHARGE DIAGNOSIS:  Principal Problem:    Trauma  Active Problems:    Respiratory failure following trauma (HCC)    Pneumomediastinum (HCC)    Fracture closed, thyroid cartilage, initial encounter (HCC)    Elevated troponin    A-fib (HCC)    Diabetes (HCC)      Overview: 1996    History of chronic CHF      Overview: 2020 SOB then Dx CHF 2021 Becerra for 3 days       Pneumothorax on left    Syncope and collapse    Fever    High cholesterol    No contraindication to deep vein thrombosis (DVT) prophylaxis    Hypothyroid    Primary hypertension    Urinary retention    GERD (gastroesophageal reflux disease)  Resolved Problems:    * No resolved hospital problems. *      PROCEDURES:  1.  Date of procedure 3/1/2024, performed by Dr. Contreras  - Tracheostomy tube placement  - Direct laryngoscopy and tracheoscopy.     HISTORY OF PRESENT ILLNESS: The patient is a 74 y.o. male who was reportedly injured in a fall.  Patient experienced a possible syncopal event and had a ground-level fall.  He had an unknown loss of consciousness.  A GCS of 3 was reported in the field and a supraglottic airway was placed.  The patient was initially evaluated at [facility] where he was intubated.  CT imaging demonstrated bilateral pneumothoraces, with the left greater than the right, as well as extensive pneumomediastinum and a thyroid cartilage fracture concerning for a tracheal injury.  A left-sided chest tube was placed with resolution of his left pneumothorax.  Patient received Ancef.  Patient is chronically anticoagulated with Xarelto.  He did not receive Kcentra at [facility]. He was transferred to [facility] in [place], [place].    HOSPITAL COURSE: The patient was triaged as a full trauma activation.  Vital signs were stable and GCS of 7T in the trauma bay.  The patient was transported to trauma intensive care unit.    As previously mentioned, the patient had a pneumomediastinum per imaging from referring facility.  Imaging demonstrated extensive gas within the superficial and deep soft tissue planes of the upper chest and neck.  CT of the neck demonstrated subcutaneous emphysema and pneumomediastinum. Gastroenterology was consulted regarding a possible EGD given the concerns for tracheal injury.  Dr. Armstrong recommended that upper endoscopy through the region may very well induce more injury therefore he recommended an ENT consult.  Dr. Contreras was consulted. CT neck demonstrated a left paramedian thyroid cartilage fracture sparing the anterior commissure, probable left paramedian cricoid cartilage fracture as well with some but not severe narrowing of AP diameter of airway at level of cricoid.  On 3/1/2024, Dr. Contreras took the patient to the operating suite for exploration and tracheostomy.  On 3/2/2024, patient was switched to the trach collar and had improved coughing.  On 3/6/24 patient was downsized to #6 noncuffed.  Keflex was initiated for purulent drainage for 7 days.  On 3/9/2024, Dr. Contreras removed the trach.  To date the patient has been tolerating room air and denies shortness of breath.  A FEEs exam was completed and the patient's diet was advanced to soft and small bite diet.  He is tolerating a diet and denies choking and nausea and vomiting.    Per imaging from the referring facility, the patient did have a pneumothorax on the left and a left chest tube was placed by Hernan.  On 2/29/2024 the chest tube was transition to waterseal.  Chest tube remained in place until definitive management of airway.  Chest x-ray on 3/3/2024 demonstrated no pneumothorax therefore chest tube was removed.    In regards  to the syncopal event, cardiology was consulted.  Patient had an admission troponin level of 354.  An EKG was completed with sinus rhythm and no acute changes.  Cardiology recommended initiation of heparin drip, aspirin and Xarelto when feasible.  Therefore heparin and aspirin were initiated on 3/3/2024.  Patient has atrial fibrillation that is treated with Xarelto and Toprol XI.  Toprol was initiated on admit and Xarelto was initiated on 3/8/2024.  Per chart review, patient does have a history of dilated cardiomyopathy and an echocardiogram was ordered and demonstrated a LVEF of 50%.  Troponins were trended and quickly started to decrease and there were no further recommendations from cardiology.    Patient received occupational, physical and speech therapy.  Their recommendations were to go home with home health.  Unfortunately the patient lives in [place] and we are unable to arrange for home health follow-up.  Additionally, [facility] and his current PCP is not a recognized provider.  Therefore we have set up a PCP appointment for the patient on March 13 at 9:30 in the morning.  I have placed outpatient referrals for occupational, physical and speech therapy.    On the day of discharge, the patient has a GCS of 15 with no focal neurological deficits.  He is afebrile and nontoxic in appearance.  He is tolerating a soft and small bite diet.  He is ambulatory with a front wheel walker.  He is tolerating room air.  I have discussed prescriptions and follow-up with the patient and his wife.  All questions answered.    HOSPITAL PROBLEM LIST:  * Trauma- (present on admission)  Assessment & Plan  Walked into gas station stated he didn't feel well then had syncopal episode.  Trauma Red Transfer Activation from [facility] in [place], [place].  Taz Ward MD. Trauma Surgery.    Elevated troponin- (present on admission)  Assessment & Plan  Admission troponin level 354.  EKG with SR without  acute changes.  3/3 Cardiology recommendation:  Heparin gtt, ASA 81, and Xarelto when feasible.  Secretions from trach are no longer bloody.  Heparin gtt and ASA started  3/8 Continues on Xarelto  Otis Ni MD, Cardiology    Fracture closed, thyroid cartilage, initial encounter (HCC)- (present on admission)  Assessment & Plan  CT at [place] showing mildly displaced fracture of the left para midline aspect of the thyroid cartilage.  Bronchoscopy in ICU without evidence of tracheobronchial injury distal to the endotracheal tube however bloody sections noted proximally.  CT neck with thyroid cartilage fracture & suspect injury to cricoid cartilage.  3/2 Tracheostomy placement. Direct laryngoscopy and tracheoscopy.  3/9 Trach removed by Dr. Contreras.  Flaco Contreras MD. [facility].    Pneumomediastinum ([facility])- (present on admission)  Assessment & Plan  Extensive gas is noted within the superficial and deep soft tissue planes of the upper chest and neck.  Opacification of a 4 cm segment of the trachea, distal to the cords, surrounding the endotracheal tube is present, and could represent a manifestation of tracheal injury.  CT neck with subcutaneous emphysema & pneumomediastinum.  ENT and GI consulted for concern for tracheal/esophageal injuries.  Flaco Contreras MD. [facility].  Don Armstrong MD. [facility].    Respiratory failure following trauma (HCC)- (present on admission)  Assessment & Plan  Intubated at [place].  3/1 Endoscopic evaluation of airway and trach placement today with ENT  3/2  Switch to trach collar, tolerating  3/3 Tolerating t piece.  3/6 Downsized to 6 noncuffed by ENT.  Keflex initiated for purulent drainage, 7 days.  3/9 Trach removed by Dr. Contreras. FEEs pending.  3/11 FEEs completed. Diet advanced.  3/12 Tolerating diet. Eating 50-75%. Denies choking or vomiting.    Tracheostomy to be managed by ENT.  Not to be decannulated this admission.   Can follow up with ENT as an outpatient for decannulation when appropriate    Fever  Assessment & Plan  Temperature 100-101 F, responding to tylenol.  Signficant secretion burden, likely from this, no clearly purulent, normal wbc count.  Continue to monitor and treat with tylenol for now.    Syncope and collapse- (present on admission)  Assessment & Plan  EKG sinus rhythm & left anterior fascicular block.  Trend troponin.   Echocardiogram with LVEF 50%.  Continuous cardiac monitoring  3/1 Some ectopy overnight self limited and seems to be asymptomatic, reviewed electrolytes WNL    Pneumothorax on left- (present on admission)  Assessment & Plan  Left chest tube placed by Hernan.  2/29 Water seal chest tube.  3/1 Will keep chest tube until definitive management of airway  3/2 CXR in AM, if trace pneumothorax is stable, remove chest tube  3/3 CXR without pneumo, chest tube removal.  Serial CXRs    History of chronic CHF- (present on admission)  Assessment & Plan  Per chart review.   History of dilated cardiomyopathy  2/28 Echocardiogram with LVEF 50%.    Diabetes (HCC)- (present on admission)  Assessment & Plan  Chronic condition treated with Insulin and metformin.  Holding maintenance metformin for 48 hours following intravenous contrast administration.  Insulin sliding scale coverage.  3/1 15 u lantus resumed.   3/2 lantus increase to 25 u  3/3 Start insulin gtt  3/5 Insulin gtt stopped.  3/6 Basilar insulin increased to 35 units qAM.  3/11 Initiated metformin.    A-fib (HCC)- (present on admission)  Assessment & Plan  Chronic condition treated with Xarelto and Toprol Xl  EKG sinus rhythm.  Toprol XL resumed on admit.  Systemic anticoagulation held on admission.  3/8 Continued Xarelto    GERD (gastroesophageal reflux disease)- (present on admission)  Assessment & Plan  Chronic condition treated with omeprazole.  Holding maintenance medication during acute traumatic illness.  Lansoprasole via enteral tube.    Urinary  retention- (present on admission)  Assessment & Plan  Chronic condition treated with tamsulosin.  Holding maintenance medication during acute traumatic illness. Consider initiation when tolerating PO.    Primary hypertension- (present on admission)  Assessment & Plan  Chronic condition treated with lisinopril.  3/6 Resumed maintenance medication.    Hypothyroid- (present on admission)  Assessment & Plan  Chronic condition treated with levothyroxine.  3/6 Resumed maintenance medication.    No contraindication to deep vein thrombosis (DVT) prophylaxis- (present on admission)  Assessment & Plan  2/29 Prophylactic dose enoxaparin 40 mg BID initiated.     High cholesterol- (present on admission)  Assessment & Plan  Chronic condition treated with Crestor.  Resumed maintenance medication on admission.          DISPOSITION: Discharged home in stable condition on 3/12/2024. The patient and family were counseled and questions were answered. Specifically, signs and symptoms of infection, respiratory decompensation and persistent or worsening pain were discussed and the patient agrees to seek medical attention if any of these develop.    DISCHARGE MEDICATIONS:  The patients controlled substance history was reviewed and a controlled substance use informed consent (if applicable) was provided by [facility] and the patient has been prescribed.     Medication List        START taking these medications        Instructions   acetaminophen 500 MG Tabs  Commonly known as: Tylenol   1-2 Tablets by Enteral Tube route every 6 hours as needed for Moderate Pain. Take as directed on the bottle. Take as needed for pain.  Dose: 500-1,000 mg     aspirin 81 MG Chew chewable tablet  Start taking on: March 13, 2024  Commonly known as: Asa   Chew 1 Tablet every day.  Dose: 81 mg     cephALEXin 250 MG/5ML Susr  Commonly known as: Keflex   Take 10 mL by mouth 4 times a day for 6 days.  Dose: 500 mg     metoprolol tartrate 25 MG  Tabs  Commonly known as: Lopressor   Take 1 Tablet by mouth 2 times a day for 30 days.  Dose: 25 mg     senna-docusate 8.6-50 MG Tabs  Commonly known as: Pericolace Or Senokot S   Take 1 Tablet by mouth every day.  Dose: 1 Tablet            CHANGE how you take these medications        Instructions   * levothyroxine 75 MCG Tabs  What changed: Another medication with the same name was changed. Make sure you understand how and when to take each.  Commonly known as: Synthroid   Take 75 mcg by mouth every morning on an empty stomach.  Dose: 75 mcg     * levothyroxine 75 MCG Tabs  Start taking on: March 13, 2024  What changed: medication strength  Commonly known as: Synthroid   Take 1 Tablet by mouth every morning on an empty stomach for 30 days.  Dose: 75 mcg     * rosuvastatin 20 MG Tabs  What changed: Another medication with the same name was changed. Make sure you understand how and when to take each.  Commonly known as: Crestor   Take 20 mg by mouth every evening.  Dose: 20 mg     * rosuvastatin 20 MG Tabs  Start taking on: March 13, 2024  What changed:   medication strength  additional instructions  Commonly known as: Crestor   Take 1 Tablet by mouth every day.  Dose: 20 mg           * This list has 4 medication(s) that are the same as other medications prescribed for you. Read the directions carefully, and ask your doctor or other care provider to review them with you.                CONTINUE taking these medications        Instructions   gabapentin 300 MG Caps  Commonly known as: Neurontin   Take 1 Capsule by mouth 2 times a day for 14 days.  Dose: 300 mg     IRON PO   Take 325 mg by mouth 2 times a day.  Dose: 325 mg     LANTUS SC   Inject 24 Units under the skin at bedtime.  Dose: 24 Units     lisinopril 2.5 MG Tabs  Commonly known as: Prinivil   Take 2.5 mg by mouth every day.  Dose: 2.5 mg     Magnesium Oxide 420 MG Tabs   Take 420 mg by mouth every day.  Dose: 420 mg     MELATONIN ER PO   Take 3 mg by mouth  at bedtime. Unknown if patient taking. Could be purchasing OTC  Dose: 3 mg     metFORMIN 500 MG Tabs  Commonly known as: Glucophage   Take 2 Tablets by mouth 2 times a day with meals.  Dose: 1,000 mg     omeprazole 20 MG delayed-release capsule  Commonly known as: PriLOSEC   Take 40 mg by mouth every day.  Dose: 40 mg     rivaroxaban 20 MG Tabs tablet  Commonly known as: Xarelto   Take 1 tablet by mouth with dinner.  Dose: 20 mg     tamsulosin 0.4 MG capsule  Commonly known as: Flomax   Take 1 Capsule by mouth at bedtime.  Dose: 0.4 mg     vitamin D3 1000 Unit (25 mcg) Tabs  Commonly known as: Cholecalciferol   Take 1,000 Units by mouth every day. Unknown if patient taking, could be purchasing OTC  Dose: 1,000 Units              ACTIVITY:  Activity as tolerated.    WOUND CARE:  Okay to change dressing to neck area daily.    DIET:  Orders Placed This Encounter   Procedures    Diet Order Diet: Level 6 - Soft and Bite Sized (Meals up in chair; throat clear every 2-3 sips/bites ; slow bites, sips); Liquid level: Level 0 - Thin; Second Modifier: (optional): Consistent CHO (Diabetic); Tray Modifications (optional): SLP - De...     Standing Status:   Standing     Number of Occurrences:   1     Order Specific Question:   Diet:     Answer:   Level 6 - Soft and Bite Sized [23]     Comments:   Meals up in chair; throat clear every 2-3 sips/bites ; slow bites, sips     Order Specific Question:   Liquid level     Answer:   Level 0 - Thin     Order Specific Question:   Second Modifier: (optional)     Answer:   Consistent CHO (Diabetic) [4]     Order Specific Question:   Tray Modifications (optional)     Answer:   SLP - Deliver to Nursing Station       FOLLOW UP:  Yvon Martinez M.D.  [address] [address]  [phone]    Go on 3/15/2024  Please go to your hospital follow up on Friday, March 15th, 2024 at 10:15am with check in at 10:00am to fill out new patient paperwork.    Please bring your  Photo ID and Insurance card. Thank you.    Flaco Contreras M.D.  [address] [facility]  [place] [id number] [phone]    Follow up  Follow up with Dr. Contreras in 1 week for recheck. Please call the office.    [facility]    Follow up  Please follow up in 1-2 weeks to discuss hospital admission and to discuss Xaerlto and arrange for a cardiac stress test given history of CAD /LAD PCI and syncope or unclear etiology    [facility]  [address]  [place] [address]  [phone]    Follow up  Follow up in Trauma clinic if needed.      TIME SPENT ON DISCHARGE: 35 minutes      ____________________________________________  Lexus Mcnally D.N.P.    DD: 3/12/2024 12:09 PM

## 2024-03-12 NOTE — FACE TO FACE
Face to Face Note  -  Durable Medical Equipment    Lexus Mcnally D.N.P. - NPI: [id number]  I certify that this patient is under my care and that they had a durable medical equipment(DME)face to face encounter by myself that meets the physician DME face-to-face encounter requirements with this patient on:    Date of encounter:   Patient:                    MRN:                       YOB: 2025  Parvez Castellanos Marielena  [id number]  1950     The encounter with the patient was in whole, or in part, for the following medical condition, which is the primary reason for durable medical equipment:  Other - Trauma - ground level fall and generalized weakness    I certify that, based on my findings, the following durable medical equipment is medically necessary:    Walkers.    My Clinical findings support the need for the above equipment due to:  Abnormal Gait

## 2024-03-12 NOTE — DISCHARGE INSTR - CASE MGT
Patient has an appointment with [facility]cd[facility] on 3/13 at 9:30am (Appt is via Telehealth)    Patient has an appointment with Rico Martinez (PCP with [facility]) on 3/15 at 10:15am ([address] [place] [address])    [facility] Outpatient Speech Therapy:    [address] [place] [id number]    [phone]: Please call to make an appt

## 2024-03-12 NOTE — PROGRESS NOTES
Report received from day shift RN, assumed Care.   Patient is AOx4, responds appropriately.      Pain controlled at this time.  Patient is tolerating gi soft diet, denies nausea/vomiting. + flatus  Up stand by assist with FWW with steady gait. Previous trache site covered with gauze and tape, dry and intact.     Plan of care discussed, all questions answered.    Educated on use of call light and importance of calling before getting out of bed. Pt verbalizes understanding.    Call light and belongings within reach, treaded slipper socks on, bed in lowest locked position.  All needs met at this time.

## 2024-03-12 NOTE — DISCHARGE PLANNING
Case Management Discharge Planning    Admission Date: 2/27/2024  GMLOS: 26.1  ALOS: 14    6-Clicks ADL Score: 20  6-Clicks Mobility Score: 18      Anticipated Discharge Dispo: Discharge Disposition: Discharged to home/self care (01)  Discharge Contact Phone Number: [phone]    DME Needed: Yes    DME Ordered: Yes; FWW delivered to bedside per bedside RN    Action(s) Taken: Chart review completed. Patient discussed during IDT rounds.     Patient to discharge home today with OP speech therapy.     RNCM met with patient and patient's family at bedside to discuss f/u appts, deliver IMM    Escalations Completed: None    Medically Clear: Yes

## 2024-03-12 NOTE — PROGRESS NOTES
Patient discharged to [facility] with wife accompanying. Education provided regarding dressing changes on trach site, supplies provided. FWW delivered to room. All needs met at this time.

## 2024-03-12 NOTE — DIETARY
Nutrition Services: Brief Update    Pt previously on tube feeding.  FEES with SLP yesterday.  Diet advanced to L6/L0 (soft and bite sized, thin liquids) with consistent CHO (Diabetic) modification.  PO has been >50% for two documented meals thus far.  Enteral tube removed yesterday. Tube feeding order discontinued.    RD will follow per dept guidelines.

## 2024-03-15 PROBLEM — S19.80XA BLUNT TRAUMA OF NECK: Status: ACTIVE | Noted: 2024-03-15

## 2024-03-15 PROBLEM — Z43.0 TRACHEOSTOMY CARE (HCC): Status: ACTIVE | Noted: 2024-03-15

## 2024-05-12 NOTE — ED PROVIDER NOTES
ER Provider Note    Scribed for Muna Vaughn M.d. by Kishore Maldonado. 2/27/2024  4:47 PM    Primary Care Provider: Bobby Mcdowell M.D.    CHIEF COMPLAINT  No chief complaint on file.    LIMITATION TO HISTORY   Select: Patient is sedated at this time    HPI/ROS  OUTSIDE HISTORIAN(S):  EMS provided entire history as detailed below    EXTERNAL RECORDS REVIEWED  External ED Note [facility] note reviewed most of the history obtained from this as patient is intubated and cannot contribute.    Parvez Peguero is a 74 y.o. male who presents to the ED via EMS for evaluation as a Trauma Red transfer from [facility], onset today. Per EMS the patient was at a  [employer] station today when he told the  he was feeling unwell. The patient then walked outside and suddenly collapsed and became unresponsive. The  called EMS and upon their arrival the patient was unresponsive to pain. There were no obvious signs of trauma noted at this time. On arrival to [facility] the patient was intubated and CT imaging was ordered. Imaging revealed bilateral pneumothoraces left greater than right bilateral pneumonia diffuse pneumomediastinum and subcutaneous emphysema as well as a thyroid cartilage fracture. Thyroid cartilage fracture was discussed with ENT and General Surgery and determined transfer to [facility] was necessary for further evaluation and care. The patient was on fentanyl and propofol drips for sedation at [place]. EMS reports discontinuing fentanyl and administering two ketamine pushes en route. The second push was administer as the patient became agitated en route. The patient has a history of atrial fibrillation on Xarelto, hypertension, and congestive heart failure.    PAST MEDICAL HISTORY  Past Medical History:   Diagnosis Date    A-fib (HCC)     Acute nasopharyngitis     COVID Jan 1 2022    Allergy     Anemia     Arthritis     At risk for sleep apnea     Atrial fibrillation (HCC)      No dressing is necessary. You may shower.  Do not scrub the incision, gently wash with soap and water and rinse.  Do not soak the incision including baths, hot tubs, swimming, until your postoperative visit.  Do not lift weight greater than 10 lb until  your postoperative visit.  Schedule a postoperative visit with your surgeon for 2 weeks from date of surgery.    Continue all your medications, especially your cardiac medications including Eliquis and Plavix.    You may take Tylenol for pain. Use prescription pain medication if the Tylenol alone is not enough.  DO NOT EXCEED 4,000MG OF ACETAMINOPHEN DAILY - BE CAREFUL BECAUSE YOUR PRESCRIPTION PAIN MEDICATION INCLUDES ACETAMINOPHEN (325 mg per tablet).    Follow-up with your primary care provider, surgical team, cardiologist   Blood clotting disorder (HCC)     DVT after R TKA 2012    Cataract     2014    Cervical radiculopathy 01/04/2022    Congestive heart failure (HCC) 2/27/2024    2020 SOB then Dx CHF 2021 Becerra for 3 days       Diabetes (HCC) 2/27/2024    1996    Diabetic polyneuropathy associated with type 2 diabetes mellitus (HCC) 01/04/2022    Dilated cardiomyopathy (HCC)     Dyslipidemia 11/18/2022    Heart failure, congestive, etiology unknown (HCC)     Afib with Cardio version April 2021    High cholesterol 2/27/2024    Hypertension     Indigestion     Nonrheumatic mitral valve regurgitation 08/01/2022    Other specified injuries of thyroid gland, initial encounter 2/27/2024    Pneumonia     2015 double PNX 2 days in ICU    Pulmonary hypertension (HCC)     Urinary incontinence        SURGICAL HISTORY  Past Surgical History:   Procedure Laterality Date    PB TOTAL KNEE ARTHROPLASTY Left 4/5/2022    Procedure: LEFT TOTAL KNEE ARTHROPLASTY WITH KASSI;  Surgeon: Marco Patel M.D.;  Location: [facility];  Service: Orthopedics    FL COLONOSCOPY,DIAGNOSTIC  3/14/2022    Procedure: COLONOSCOPY;  Surgeon: Ronal Lockett D.O.;  Location: [facility];  Service: General    CARDIOVASCULAR  2021    cardio version     ANGIOGRAM  2021    EYE SURGERY Left 2015    Cataracts Removal    ARTHROPLASTY Right 2012    Knee Replacement    KNEE ARTHROPLASTY TOTAL Right 2012    DVT behind knee after TKA    ORIF, FRACTURE, HUMERUS Right 2010       FAMILY HISTORY  Family History   Problem Relation Age of Onset    Diabetes Father     Arthritis Father     Lung Disease Brother     Alcohol abuse Brother     Heart Disease Mother     Hypertension Brother     Alcohol abuse Brother        SOCIAL HISTORY   reports that he has quit smoking. His smoking use included cigarettes. He has a 20 pack-year smoking history. He quit smokeless tobacco use about 33 years ago. He reports that he does not currently use alcohol. He reports that he does not use  drugs.    CURRENT MEDICATIONS  Previous Medications    ACETAMINOPHEN (TYLENOL) 500 MG TAB    Take 2 Tablets by mouth every 8 hours.    ASCORBIC ACID (VITAMIN C PO)    Take 1 Tablet by mouth every day at 6 PM.    CYANOCOBALAMIN (VITAMIN B-12) 500 MCG TAB    Take 1 Tablet by mouth every day.    DOCUSATE SODIUM (COLACE) 100 MG CAP    Take 1 Capsule by mouth 2 times a day.    FERROUS SULFATE (IRON PO)    Take 1 Capsule by mouth every day.    FUROSEMIDE (LASIX) 20 MG TAB    Take 1 Tablet by mouth every day. Take in the morning    GABAPENTIN (NEURONTIN) 300 MG CAP    Take 1 Capsule by mouth 2 times a day.    INSULIN GLARGINE (LANTUS SC)    Inject 25 mg under the skin at bedtime.    LEVOTHYROXINE (SYNTHROID) 100 MCG TAB    Take 1 Tablet by mouth every morning on an empty stomach.    MAGNESIUM OXIDE (MAG-OX) 400 MG TAB TABLET    Take 1 Tablet by mouth every day.    MELATONIN ER PO    Take 3 mg by mouth at bedtime.    METFORMIN (GLUCOPHAGE) 500 MG TABS    Take 2 Tablets by mouth 2 times a day with meals.    METOPROLOL SR (TOPROL XL) 50 MG TABLET SR 24 HR    Take 1 Tablet by mouth every day.    NUTRITIONAL SUPPLEMENTS (WELLNESS ESSENTIALS PO)    Take 1 Tablet by mouth 2 times a day.    OMEPRAZOLE (PRILOSEC) 20 MG DELAYED-RELEASE CAPSULE    Take 1 Capsule by mouth every day.    ONDANSETRON (ZOFRAN ODT) 4 MG TABLET DISPERSIBLE    Take 1 Tablet by mouth every 8 hours as needed.    POLYETHYLENE GLYCOL/LYTES (MIRALAX) 17 G PACK    Take 1 Packet by mouth 1 time a day as needed (for constipation).    RIVAROXABAN (XARELTO) 20 MG TAB TABLET    Take 1 tablet by mouth with dinner.    ROSUVASTATIN (CRESTOR) 40 MG TABLET    Take 0.5 Tablets by mouth every day. Patient takes 1/2 40mg tab    SENNOSIDES (SENNA) 8.6 MG CAP    Take 17.2 mg by mouth every day.    TAMSULOSIN (FLOMAX) 0.4 MG CAPSULE    Take 1 Capsule by mouth at bedtime.    VITAMIN D (CHOLECALCIFEROL) 1000 UNIT (25 MCG) TAB    Take 1 Tablet by mouth every day.        ALLERGIES  Patient has no known allergies.    PHYSICAL EXAM  /89   Pulse 86   Temp 37 °C (98.6 °F) (Temporal)   Resp 15   SpO2 100%   Constitutional: Patient is sedated and intubated at this time.   HENT: No signs of trauma, Bilateral external ears normal, Nose normal.   Eyes: Pupils are equal and reactive, Conjunctiva normal, Non-icteric.   Neck: No obvious deformities to erythematous potentially puncture marks to bilateral anterior neck, subcu air up to the neck and through into the chest wall  Cardiovascular: Regular rate and rhythm, no murmurs.   Thorax & Lungs: Bilateral breath sounds, Normal breath sounds, No respiratory distress, No wheezing, No chest tenderness.   Abdomen: Bowel sounds normal, Soft, No tenderness, No masses, No peritoneal signs.  Skin: Erythematous marks to the bilateral anterior neck, Warm, Dry, No rash.   Musculoskeletal: No obvious musculoskeletal deformities  Neurologic: No focal deficits.     DIAGNOSTIC STUDIES & PROCEDURES      Radiology:   The attending Emergency Physician has independently interpreted the diagnostic imaging associated with this visit and is awaiting the final reading from the radiologist, which will be displayed below.    Preliminary interpretation is a follows: Significant pneumomediastinum seen on outside hospitals CT imaging  Radiologist interpretation:   DX-CHEST-LIMITED (1 VIEW)   Final Result         1.  Pneumomediastinum. Soft tissue gas in the lower neck.   2.  Small pneumothorax on the left is suspected. Left chest tube is in place.      US-ABORTED US PROCEDURE    (Results Pending)   EC-ECHOCARDIOGRAM COMPLETE W/O CONT    (Results Pending)   CT-SOFT TISSUE NECK WITH    (Results Pending)   DX-CHEST-PORTABLE (1 VIEW)    (Results Pending)        COURSE & MEDICAL DECISION MAKING    ED Observation Status? No; Patient does not meet criteria for ED Observation.     4:53 PM - Patient seen and evaluated in the trauma bay as a trauma red. Dr. Ward  (Trauma) at bedside.  Patient will be treated with fentanyl injection 50 mcg for his symptoms. Ordered DX-Chest and Us-Aborted US procedure to evaluate.     4:58 PM Patient placed in a c-collar.    5:02 PM Dr. Ward (Trauma Surgery) agreed to hospitalize the patient. Patient's care was transferred at this time to Dr. Ward (Trauma Surgery)     INITIAL ASSESSMENT AND PLAN  Care Narrative: This is 74-year-old gentleman that presents as a trauma red.  Unclear what the initial mechanism of injury was he did sustain a ground-level fall.  He has significant subcutaneous air, pneumomediastinum seen.  The etiology of this is quite unclear however he is intubated.  He is requiring sedation.  Left chest tube is in place lines and tubes that were placed at the outside facility are in place on repeat x-ray.  Patient will be hospitalized in the trauma ICU for further workup and management.                 DISPOSITION AND DISCUSSIONS  I have discussed management of the patient with the following physicians and SALVADOR's:   Dr. Ward (Trauma Surgery)     Discussion of management with other QHP or appropriate source(s): Pharmacy   and RT at bedside for Trauma Red encounter          DISPOSITION:  Patient will be hospitalized by Dr. Ward in critical condition.     FINAL IMPRESSION   1. Pneumomediastinum (HCC)    2. Pneumothorax, unspecified type    3. Acute respiratory failure with hypoxia (HCC)        Kishore CORNELL (Angel), am scribing for, and in the presence of, Muna Vaughn M.D..    Electronically signed by: Kishore Maldonado (Angel), 2/27/2024    IMuna M.D. personally performed the services described in this documentation, as scribed by Kishore Maldonado in my presence, and it is both accurate and complete.    The note accurately reflects work and decisions made by me.  Muna Vaughn M.D.  2/27/2024  8:27 PM

## (undated) DEVICE — SPONGE DRAIN 4 X 4IN 6-PLY - (2/PK25PK/BX12BX/CS)

## (undated) DEVICE — TUBING CLEARLINK DUO-VENT - C-FLO (48EA/CA)

## (undated) DEVICE — ELECTRODE DUAL RETURN W/ CORD - (50/PK)

## (undated) DEVICE — GLOVE BIOGEL PI INDICATOR SZ 8.0 SURGICAL PF LF -(50/BX 4BX/CA)

## (undated) DEVICE — BAG SPONGE COUNT 10.25 X 32 - BLUE (250/CA)

## (undated) DEVICE — SYRINGE 10 ML CONTROL LL (25EA/BX 4BX/CA)

## (undated) DEVICE — GOWN WARMING STANDARD FLEX - (30/CA)

## (undated) DEVICE — PATTIES SURG NEURO X-RAY 1/2X1/2 (10EA/PK 20PK/CS)

## (undated) DEVICE — SUTURE 2-0 ETHILON FS - (36/BX) 18 INCH

## (undated) DEVICE — SET EXTENSION WITH 2 PORTS (48EA/CA) ***PART #2C8610 IS A SUBSTITUTE*****

## (undated) DEVICE — GLOVE BIOGEL PI ORTHO SZ 7.5 PF LF (40PR/BX)

## (undated) DEVICE — CANISTER SUCTION 3000ML MECHANICAL FILTER AUTO SHUTOFF MEDI-VAC NONSTERILE LF DISP  (40EA/CA)

## (undated) DEVICE — DRAPE SURGICAL U 77X120 - (10/CA)

## (undated) DEVICE — SENSOR OXIMETER ADULT SPO2 RD SET (20EA/BX)

## (undated) DEVICE — GOWN SURGEONS LARGE - (32/CA)

## (undated) DEVICE — LACTATED RINGERS INJ 1000 ML - (14EA/CA 60CA/PF)

## (undated) DEVICE — TEETHGUARD ENT -2BX MIN ORDER- (6EA/BX)

## (undated) DEVICE — SUCTION INSTRUMENT YANKAUER BULBOUS TIP W/O VENT (50EA/CA)

## (undated) DEVICE — ANTI-FOG SOLUTION - 60BTL/CA

## (undated) DEVICE — GLOVE BIOGEL SZ 7.5 SURGICAL PF LTX - (50PR/BX 4BX/CA)

## (undated) DEVICE — SLEEVE VASO CALF MED - (10PR/CA)

## (undated) DEVICE — GLOVE BIOGEL PI INDICATOR SZ 7.0 SURGICAL PF LF - (50/BX 4BX/CA)

## (undated) DEVICE — SUTURE GENERAL

## (undated) DEVICE — SPONGE PEANUT - (5/PK 50PK/CA)

## (undated) DEVICE — SODIUM CHL IRRIGATION 0.9% 1000ML (12EA/CA)

## (undated) DEVICE — TUBE TRACHEOSTOMY BLUSELECT SUCTIONAIDE SIZE 8 (1/EA)

## (undated) DEVICE — MEDICINE CUP STERILE 2 OZ - (100/CA)

## (undated) DEVICE — CLOSURE SKIN STRIP 1/2 X 4 IN - (STERI STRIP) (50/BX 4BX/CA)

## (undated) DEVICE — CHLORAPREP 26 ML APPLICATOR - ORANGE TINT(25/CA)

## (undated) DEVICE — BLADE SURGICAL #11 - (50/BX)

## (undated) DEVICE — COVER LIGHT HANDLE ALC PLUS DISP (18EA/BX)

## (undated) DEVICE — TOWELS CLOTH SURGICAL - (4/PK 20PK/CA)

## (undated) DEVICE — TRACHE SECURE VELCRO LARGE

## (undated) DEVICE — GLOVE SZ 7 BIOGEL PI MICRO - PF LF (50PR/BX 4BX/CA)

## (undated) DEVICE — PACK MINOR BASIN - (2EA/CA)

## (undated) DEVICE — SHEET THYROID - (10EA/CA)

## (undated) DEVICE — SLEEVE, VASO, THIGH, MED

## (undated) DEVICE — SET LEADWIRE 5 LEAD BEDSIDE DISPOSABLE ECG (1SET OF 5/EA)